# Patient Record
Sex: FEMALE | Race: WHITE | NOT HISPANIC OR LATINO | Employment: UNEMPLOYED | URBAN - METROPOLITAN AREA
[De-identification: names, ages, dates, MRNs, and addresses within clinical notes are randomized per-mention and may not be internally consistent; named-entity substitution may affect disease eponyms.]

---

## 2018-06-07 LAB
HBV SURFACE AG SERPL QL IA: NORMAL
HIV 1+2 AB+HIV1 P24 AG SERPL QL IA: NONREACTIVE
RUBELLA ABY IGG: NORMAL

## 2018-06-14 ENCOUNTER — HOSPITAL ENCOUNTER (OUTPATIENT)
Dept: ULTRASOUND IMAGING | Facility: CLINIC | Age: 28
Discharge: HOME | End: 2018-06-14
Attending: MIDWIFE | Admitting: MIDWIFE
Payer: MEDICAID

## 2018-06-14 DIAGNOSIS — Z3A.01 LESS THAN 8 WEEKS GESTATION OF PREGNANCY: ICD-10-CM

## 2018-06-14 PROCEDURE — T1013 SIGN LANG/ORAL INTERPRETER: HCPCS | Mod: U3

## 2018-06-14 PROCEDURE — 76817 TRANSVAGINAL US OBSTETRIC: CPT

## 2018-06-21 ENCOUNTER — HOSPITAL ENCOUNTER (EMERGENCY)
Facility: CLINIC | Age: 28
Discharge: HOME | End: 2018-06-21
Attending: EMERGENCY MEDICINE | Admitting: EMERGENCY MEDICINE
Payer: MEDICAID

## 2018-06-21 VITALS
RESPIRATION RATE: 20 BRPM | DIASTOLIC BLOOD PRESSURE: 65 MMHG | SYSTOLIC BLOOD PRESSURE: 108 MMHG | TEMPERATURE: 98.1 F | OXYGEN SATURATION: 100 % | WEIGHT: 86.5 LBS | HEART RATE: 107 BPM

## 2018-06-21 DIAGNOSIS — E87.6 HYPOKALEMIA: ICD-10-CM

## 2018-06-21 DIAGNOSIS — O21.0 HYPEREMESIS GRAVIDARUM: ICD-10-CM

## 2018-06-21 LAB
ALBUMIN SERPL-MCNC: 4.2 G/DL (ref 3.4–5)
ALBUMIN UR-MCNC: 30 MG/DL
ALP SERPL-CCNC: 59 U/L (ref 40–150)
ALT SERPL W P-5'-P-CCNC: 25 U/L (ref 0–50)
AMORPH CRY #/AREA URNS HPF: ABNORMAL /HPF
ANION GAP SERPL CALCULATED.3IONS-SCNC: 14 MMOL/L (ref 3–14)
APPEARANCE UR: ABNORMAL
AST SERPL W P-5'-P-CCNC: 25 U/L (ref 0–45)
BASOPHILS # BLD AUTO: 0 10E9/L (ref 0–0.2)
BASOPHILS NFR BLD AUTO: 0.1 %
BILIRUB SERPL-MCNC: 0.6 MG/DL (ref 0.2–1.3)
BILIRUB UR QL STRIP: NEGATIVE
BUN SERPL-MCNC: 9 MG/DL (ref 7–30)
CALCIUM SERPL-MCNC: 9.4 MG/DL (ref 8.5–10.1)
CHLORIDE SERPL-SCNC: 103 MMOL/L (ref 94–109)
CO2 SERPL-SCNC: 23 MMOL/L (ref 20–32)
COLOR UR AUTO: YELLOW
CREAT SERPL-MCNC: 0.52 MG/DL (ref 0.52–1.04)
DIFFERENTIAL METHOD BLD: NORMAL
EOSINOPHIL # BLD AUTO: 0 10E9/L (ref 0–0.7)
EOSINOPHIL NFR BLD AUTO: 0.2 %
ERYTHROCYTE [DISTWIDTH] IN BLOOD BY AUTOMATED COUNT: 12.1 % (ref 10–15)
GFR SERPL CREATININE-BSD FRML MDRD: >90 ML/MIN/1.7M2
GLUCOSE SERPL-MCNC: 81 MG/DL (ref 70–99)
GLUCOSE UR STRIP-MCNC: NEGATIVE MG/DL
HCT VFR BLD AUTO: 40.5 % (ref 35–47)
HGB BLD-MCNC: 14.4 G/DL (ref 11.7–15.7)
HGB UR QL STRIP: NEGATIVE
IMM GRANULOCYTES # BLD: 0 10E9/L (ref 0–0.4)
IMM GRANULOCYTES NFR BLD: 0.2 %
KETONES UR STRIP-MCNC: >150 MG/DL
LACTATE BLD-SCNC: 1.3 MMOL/L (ref 0.7–2)
LEUKOCYTE ESTERASE UR QL STRIP: NEGATIVE
LYMPHOCYTES # BLD AUTO: 2 10E9/L (ref 0.8–5.3)
LYMPHOCYTES NFR BLD AUTO: 23.2 %
MCH RBC QN AUTO: 31 PG (ref 26.5–33)
MCHC RBC AUTO-ENTMCNC: 35.6 G/DL (ref 31.5–36.5)
MCV RBC AUTO: 87 FL (ref 78–100)
MONOCYTES # BLD AUTO: 0.6 10E9/L (ref 0–1.3)
MONOCYTES NFR BLD AUTO: 6.7 %
MUCOUS THREADS #/AREA URNS LPF: PRESENT /LPF
NEUTROPHILS # BLD AUTO: 6.1 10E9/L (ref 1.6–8.3)
NEUTROPHILS NFR BLD AUTO: 69.6 %
NITRATE UR QL: NEGATIVE
NRBC # BLD AUTO: 0 10*3/UL
NRBC BLD AUTO-RTO: 0 /100
PH UR STRIP: 8 PH (ref 5–7)
PLATELET # BLD AUTO: 214 10E9/L (ref 150–450)
POTASSIUM SERPL-SCNC: 3.1 MMOL/L (ref 3.4–5.3)
PROT SERPL-MCNC: 8.8 G/DL (ref 6.8–8.8)
RBC # BLD AUTO: 4.64 10E12/L (ref 3.8–5.2)
RBC #/AREA URNS AUTO: 2 /HPF (ref 0–2)
SODIUM SERPL-SCNC: 140 MMOL/L (ref 133–144)
SOURCE: ABNORMAL
SP GR UR STRIP: 1.03 (ref 1–1.03)
SQUAMOUS #/AREA URNS AUTO: 3 /HPF (ref 0–1)
UROBILINOGEN UR STRIP-MCNC: 2 MG/DL (ref 0–2)
WBC # BLD AUTO: 8.8 10E9/L (ref 4–11)
WBC #/AREA URNS AUTO: 1 /HPF (ref 0–5)

## 2018-06-21 PROCEDURE — 83605 ASSAY OF LACTIC ACID: CPT | Performed by: EMERGENCY MEDICINE

## 2018-06-21 PROCEDURE — 99284 EMERGENCY DEPT VISIT MOD MDM: CPT | Mod: Z6 | Performed by: EMERGENCY MEDICINE

## 2018-06-21 PROCEDURE — 81001 URINALYSIS AUTO W/SCOPE: CPT | Performed by: EMERGENCY MEDICINE

## 2018-06-21 PROCEDURE — 96374 THER/PROPH/DIAG INJ IV PUSH: CPT | Performed by: EMERGENCY MEDICINE

## 2018-06-21 PROCEDURE — 96361 HYDRATE IV INFUSION ADD-ON: CPT | Performed by: EMERGENCY MEDICINE

## 2018-06-21 PROCEDURE — 80053 COMPREHEN METABOLIC PANEL: CPT | Performed by: EMERGENCY MEDICINE

## 2018-06-21 PROCEDURE — 85025 COMPLETE CBC W/AUTO DIFF WBC: CPT | Performed by: EMERGENCY MEDICINE

## 2018-06-21 PROCEDURE — 25000132 ZZH RX MED GY IP 250 OP 250 PS 637: Performed by: EMERGENCY MEDICINE

## 2018-06-21 PROCEDURE — 99284 EMERGENCY DEPT VISIT MOD MDM: CPT | Mod: 25 | Performed by: EMERGENCY MEDICINE

## 2018-06-21 PROCEDURE — 25000128 H RX IP 250 OP 636: Performed by: EMERGENCY MEDICINE

## 2018-06-21 PROCEDURE — 96375 TX/PRO/DX INJ NEW DRUG ADDON: CPT | Performed by: EMERGENCY MEDICINE

## 2018-06-21 RX ORDER — ONDANSETRON 2 MG/ML
4 INJECTION INTRAMUSCULAR; INTRAVENOUS ONCE
Status: COMPLETED | OUTPATIENT
Start: 2018-06-21 | End: 2018-06-21

## 2018-06-21 RX ORDER — POTASSIUM CHLORIDE 1.5 G/1.58G
40 POWDER, FOR SOLUTION ORAL ONCE
Status: COMPLETED | OUTPATIENT
Start: 2018-06-21 | End: 2018-06-21

## 2018-06-21 RX ORDER — ONDANSETRON 4 MG/1
4 TABLET, ORALLY DISINTEGRATING ORAL EVERY 8 HOURS PRN
Qty: 10 TABLET | Refills: 0 | Status: SHIPPED | OUTPATIENT
Start: 2018-06-21 | End: 2018-06-24

## 2018-06-21 RX ADMIN — SODIUM CHLORIDE 1000 ML: 9 INJECTION, SOLUTION INTRAVENOUS at 13:39

## 2018-06-21 RX ADMIN — POTASSIUM CHLORIDE 40 MEQ: 1.5 POWDER, FOR SOLUTION ORAL at 13:38

## 2018-06-21 RX ADMIN — SODIUM CHLORIDE 1000 ML: 9 INJECTION, SOLUTION INTRAVENOUS at 12:32

## 2018-06-21 RX ADMIN — PROCHLORPERAZINE EDISYLATE 5 MG: 5 INJECTION INTRAMUSCULAR; INTRAVENOUS at 12:33

## 2018-06-21 RX ADMIN — ONDANSETRON 4 MG: 2 INJECTION INTRAMUSCULAR; INTRAVENOUS at 12:33

## 2018-06-21 ASSESSMENT — ENCOUNTER SYMPTOMS
NECK STIFFNESS: 0
VOMITING: 1
ARTHRALGIAS: 0
DIFFICULTY URINATING: 0
EYE REDNESS: 0
ABDOMINAL PAIN: 0
NAUSEA: 1
SHORTNESS OF BREATH: 0
LIGHT-HEADEDNESS: 1
WEAKNESS: 1
FEVER: 0
HEADACHES: 0
CONFUSION: 0
COLOR CHANGE: 0

## 2018-06-21 NOTE — ED PROVIDER NOTES
History     Chief Complaint   Patient presents with     Hyperemesis     7 weeks pregant, unable to keep food or liquids down,  reports some blood in emesis     A  was used ( via Tame per ER iPad).     Marylin Mejía is a 27 year old female who is  at approximately 7 weeks' pregnancy who presents for evaluation of hyperemesis gravidarum.  The patient reports that since the onset of her pregnancy she has had ongoing, persistent, intense nausea as well as vomiting.  She estimates that she vomits approximate 50 times per day, vomiting essentially anything she attempts to take orally.  She does report a weight loss of 4-5 kg in the past week or so, and in the last couple of days she has begun to feel rather lightheaded and generally weak.  She also describes a pain in the lower aspect of both of her calves which is new.  The patient states that she has been following with a provider at [facility] and states that she was tried on an antiemetic at one point.  She states that this was initially helpful but subsequently stopped becoming efficacious.  She states that she last took this approximately 1 week ago.  She does not recall the name of that antinausea medication.  The patient states that she had follow up scheduled for , but due to her ongoing symptoms she contact her provider this morning who apparently advised she come to the ER for evaluation, with plan to attempt to move her schedule appointment sooner.  The patient denies any vaginal bleeding.  She states that urination also has been essentially normal albeit somewhat decreased in volume.  She reports no abdominal pain.  No other concerns at this time      No current facility-administered medications for this encounter.      Current Outpatient Prescriptions   Medication     ondansetron (ZOFRAN ODT) 4 MG ODT tab     Prenatal Vit-Fe Fumarate-FA (PRENATAL VITAMIN PO)     History reviewed. No pertinent past  medical history.    History reviewed. No pertinent surgical history.    No family history on file.    Social History   Substance Use Topics     Smoking status: Never Smoker     Smokeless tobacco: Never Used     Alcohol use No     No Known Allergies    I have reviewed the Medications, Allergies, Past Medical and Surgical History, and Social History in the Epic system.    Review of Systems   Constitutional: Negative for fever.   HENT: Negative for congestion.    Eyes: Negative for redness.   Respiratory: Negative for shortness of breath.    Cardiovascular: Negative for chest pain.   Gastrointestinal: Positive for nausea and vomiting. Negative for abdominal pain.   Genitourinary: Negative for difficulty urinating.   Musculoskeletal: Negative for arthralgias and neck stiffness.        Pain in both calves   Skin: Negative for color change.   Neurological: Positive for weakness (Generalized) and light-headedness. Negative for headaches.   Psychiatric/Behavioral: Negative for confusion.   All other systems reviewed and are negative.      Physical Exam   BP: 103/68  Pulse: 85  Heart Rate: 92  Temp: 98.1  F (36.7  C)  Resp: 16  Weight: 39.2 kg (86 lb 8 oz)  SpO2: 100 %      Physical Exam   Constitutional: No distress.   HENT:   Head: Atraumatic.   Mouth/Throat: Oropharynx is clear and moist. No oropharyngeal exudate.   Eyes: Pupils are equal, round, and reactive to light. No scleral icterus.   Cardiovascular: Normal heart sounds and intact distal pulses.    Pulmonary/Chest: Breath sounds normal. No respiratory distress.   Abdominal: Soft. Bowel sounds are normal. There is no tenderness.   Musculoskeletal: She exhibits no edema or tenderness.   Skin: Skin is warm. No rash ( Acneiform rash over face) noted. She is not diaphoretic.       ED Course     ED Course     Procedures        Results for orders placed or performed during the hospital encounter of 06/21/18 (from the past 24 hour(s))   CBC with platelets differential    Result Value Ref Range    WBC 8.8 4.0 - 11.0 10e9/L    RBC Count 4.64 3.8 - 5.2 10e12/L    Hemoglobin 14.4 11.7 - 15.7 g/dL    Hematocrit 40.5 35.0 - 47.0 %    MCV 87 78 - 100 fl    MCH 31.0 26.5 - 33.0 pg    MCHC 35.6 31.5 - 36.5 g/dL    RDW 12.1 10.0 - 15.0 %    Platelet Count 214 150 - 450 10e9/L    Diff Method Automated Method     % Neutrophils 69.6 %    % Lymphocytes 23.2 %    % Monocytes 6.7 %    % Eosinophils 0.2 %    % Basophils 0.1 %    % Immature Granulocytes 0.2 %    Nucleated RBCs 0 0 /100    Absolute Neutrophil 6.1 1.6 - 8.3 10e9/L    Absolute Lymphocytes 2.0 0.8 - 5.3 10e9/L    Absolute Monocytes 0.6 0.0 - 1.3 10e9/L    Absolute Eosinophils 0.0 0.0 - 0.7 10e9/L    Absolute Basophils 0.0 0.0 - 0.2 10e9/L    Abs Immature Granulocytes 0.0 0 - 0.4 10e9/L    Absolute Nucleated RBC 0.0    Comprehensive metabolic panel   Result Value Ref Range    Sodium 140 133 - 144 mmol/L    Potassium 3.1 (L) 3.4 - 5.3 mmol/L    Chloride 103 94 - 109 mmol/L    Carbon Dioxide 23 20 - 32 mmol/L    Anion Gap 14 3 - 14 mmol/L    Glucose 81 70 - 99 mg/dL    Urea Nitrogen 9 7 - 30 mg/dL    Creatinine 0.52 0.52 - 1.04 mg/dL    GFR Estimate >90 >60 mL/min/1.7m2    GFR Estimate If Black >90 >60 mL/min/1.7m2    Calcium 9.4 8.5 - 10.1 mg/dL    Bilirubin Total 0.6 0.2 - 1.3 mg/dL    Albumin 4.2 3.4 - 5.0 g/dL    Protein Total 8.8 6.8 - 8.8 g/dL    Alkaline Phosphatase 59 40 - 150 U/L    ALT 25 0 - 50 U/L    AST 25 0 - 45 U/L   Lactic acid   Result Value Ref Range    Lactic Acid 1.3 0.7 - 2.0 mmol/L   UA with Microscopic reflex to Culture   Result Value Ref Range    Color Urine Yellow     Appearance Urine Slightly Cloudy     Glucose Urine Negative NEG^Negative mg/dL    Bilirubin Urine Negative NEG^Negative    Ketones Urine >150 (A) NEG^Negative mg/dL    Specific Gravity Urine 1.029 1.003 - 1.035    Blood Urine Negative NEG^Negative    pH Urine 8.0 (H) 5.0 - 7.0 pH    Protein Albumin Urine 30 (A) NEG^Negative mg/dL    Urobilinogen  mg/dL 2.0 0.0 - 2.0 mg/dL    Nitrite Urine Negative NEG^Negative    Leukocyte Esterase Urine Negative NEG^Negative    Source Midstream Urine     WBC Urine 1 0 - 5 /HPF    RBC Urine 2 0 - 2 /HPF    Squamous Epithelial /HPF Urine 3 (H) 0 - 1 /HPF    Mucous Urine Present (A) NEG^Negative /LPF    Amorphous Crystals Few (A) NEG^Negative /HPF            Labs Ordered and Resulted from Time of ED Arrival Up to the Time of Departure from the ED   COMPREHENSIVE METABOLIC PANEL - Abnormal; Notable for the following:        Result Value    Potassium 3.1 (*)     All other components within normal limits   ROUTINE UA WITH MICROSCOPIC REFLEX TO CULTURE - Abnormal; Notable for the following:     Ketones Urine >150 (*)     pH Urine 8.0 (*)     Protein Albumin Urine 30 (*)     Squamous Epithelial /HPF Urine 3 (*)     Mucous Urine Present (*)     Amorphous Crystals Few (*)     All other components within normal limits   CBC WITH PLATELETS DIFFERENTIAL   LACTIC ACID WHOLE BLOOD   ORTHOSTATIC BLOOD PRESSURE AND PULSE            Assessments & Plan (with Medical Decision Making)   27-year-old female who is 7 weeks pregnant presents with 2 week history of recurrent vomiting.  Differential includes gastroenteritis, hyperemesis gravidarum, obstruction, other.  Initial exam revealed normal vital signs with a benign abdomen.  Laboratories were obtained including CBC, comprehensive metabolic panel and lactic acid all of which were normal with exception of slightly low potassium.  Urinalysis revealed urinary ketones.  Patient was given Zofran, Compazine, potassium and IV fluids in the emergency room and felt significantly improved.  She was able to tolerate fluids.  Signs and symptoms most consistent with hyperemesis gravidarum.  I recommended expedient follow-up with her primary care provider.  She will be discharged with prescription for Zofran.    I have reviewed the nursing notes.    I have reviewed the findings, diagnosis, plan and need for  follow up with the patient.    New Prescriptions    ONDANSETRON (ZOFRAN ODT) 4 MG ODT TAB    Take 1 tablet (4 mg) by mouth every 8 hours as needed for nausea       Final diagnoses:   Hyperemesis gravidarum   Hypokalemia     Robinson BARR, am serving as a trained medical scribe to document services personally performed by Yung Haile MD, based on the provider's statements to me.      Yung BARR MD, was physically present and have reviewed and verified the accuracy of this note documented by Robinson Ramírez.    6/21/2018   [facility], EMERGENCY DEPARTMENT     Yung Haile MD  06/21/18 9020

## 2018-06-21 NOTE — ED AVS SNAPSHOT
[facility], [place], Emergency Department    [address]    [facility] [address]    Phone:  [phone]    Fax:  [phone]                                       Marylin Mejía   MRN: [id number]    Department:  [facility], Emergency Department   Date of Visit:  6/21/2018           After Visit Summary Signature Page     I have received my discharge instructions, and my questions have been answered. I have discussed any challenges I see with this plan with the nurse or doctor.    ..........................................................................................................................................  Patient/Patient Representative Signature      ..........................................................................................................................................  Patient Representative Print Name and Relationship to Patient    ..................................................               ................................................  Date                                            Time    ..........................................................................................................................................  Reviewed by Signature/Title    ...................................................              ..............................................  Date                                                            Time

## 2018-06-21 NOTE — ED AVS SNAPSHOT
[facility], Emergency Department    [address]    [place] [address]    Phone:  [phone]    Fax:  [phone]                                       Marylin Mejía   MRN: [id number]    Department:  [facility], Emergency Department   Date of Visit:  6/21/2018           Patient Information     Date Of Birth          1990        Your diagnoses for this visit were:     Hyperemesis gravidarum     Hypokalemia        You were seen by Yung Haile MD.      Follow-up Information     Call [facility], Clinic.    Specialty:  Clinic    Contact information:    [address] [facility] [address]  [phone]          Discharge Instructions           Hipopotasiemia  Hipopotasiemia significa que tiene un bajo nivel de potasio en la galdino. [place] ocurre con mayor frecuencia en los pacientes que dorothy diuréticos (pastillas para orinar más). También puede presentarse debido a episodios maribell de vómitos o diarrea. O puede aparecer después de eugenia laxantes por mucho tiempo. A veces se presenta si la persona tiene un nivel bajo de magnesio (hipomagnesiemia). Si sergio es araujo seymour, araujo proveedor de atención médica evdin tratará el nivel bajo de magnesio.  Un seymour leve no suele causar síntomas. Sólo se lo detecta mediante un análisis de galdino. Las pérdidas más graves de potasio provocan carina debilidad generalizada, calambres en los músculos o el abdomen, palpitaciones (el corazón late en forma rápida o irregular), presión arterial baja, debilidad en músculos específicos y en algunas personas que están paralizadas.  Cuidados en araujo casa    [place] los suplementos de potasio que le hayan recetado.    Coma alimentos ricos en potasio. El contenido más alto de potasio se encuentra en el aguacate, las hermelinda asadas, la espinaca, el melón cantalupo, el bacalao, el hipogloso (halibut), el salmón y los ostiones (vieiras). Los frijoles zuniga, los frijoles blancos y los frijoles rojos son también buenas johnson de  potasio. Carina cantidad kassandra se puede encontrar en el jugo de naranja, los plátanos (bananas), las zanahorias y el jugo de tomate.    Si usted blair ciertos tipos de diuréticos, tendrá que eugenia también suplementos de potasio todo el tiempo que esté tomando las pastillas diuréticas. Si está tomando un diurético, hable con araujo médico sobre la necesidad de eugenia suplementos de potasio.  Visitas de control  Programe carina visita de control con araujo proveedor de atención médica para repetir el análisis de galdino dentro de la próxima semana, o según le recomiende nuestro personal.  Cuándo debe buscar atención médica  Llame a araujo proveedor de atención médica si ocurre algo de lo siguiente:    Aumento de la debilidad, fatiga o calambres musculares    Mareo  Llame al 911  Llame al 911 si se presenta cualquiera de estos síntomas:    Latidos cardíacos irregulares, latidos adicionales o frecuencia cardíaca muy rápida    Pérdida del conocimiento  Date Last Reviewed: 11/16/2017 2000-2017 [employer]. [address] [place] [address]. Todos los derechos reservados. Esta información no pretende sustituir la atención médica profesional. Sólo araujo médico puede diagnosticar y tratar un problema de yulissa.          Instrucciones de gilda para la hipokalemia  A usted le cardona diagnosticado hipokalemia (bajo nivel de potasio en la galdino). El potasio ayuda en el funcionamiento de las células nerviosas y musculares, incluyendo las células del corazón. Un bajo nivel de potasio en la galdino puede causar ritmos cardíacos anormales e incluso ataques cardíacos.  Cambios en la dieta    Aumente araujo consumo de los siguientes alimentos ricos en potasio:  ? Bananas  ? Naranjas y jugo de naranja  ? Tomates, salsa de tomate y jugo de tomate  ? Verduras de hojas verdes, jackelyn espinacas, ensaladas verdes, berzas y acelgas  ? Melones (de cualquier tipo)  ? Chícharos (arvejas)  ? Frijoles  ? Gisselle  ? Batatas  ? Aguacates y guacamole  ? Jugos  "de verduras, jackelyn V8  ? Jugos de frutas  ? Todo tipo de nueces y semillas  Otros cuidados en la casa    [place] un suplemento de potasio según le indique el médico.    Después de un ejercicio fatigante o cualquier actividad que le vikki sudar mucho, bárbara Gatorade u otras bebidas deportivas que contengan potasio.    Asegúrese de comer alimentos y eugenia líquidos que contengan potasio si usted tiene diarrea o vómito.    Vikki que le revisen araujo nivel de potasio regularmente.    [place] los medicamentos exactamente según las indicaciones.    Informe al médico sobre todos los medicamentos con o sin receta que esté tomando. [place] incluye las preparaciones herbales.    Evite alimentos con alto contenido de sal. Evite alimentos enlatados y preparados que tengan mucha sal.  Visitas de control    Programe carina visita de control según le indique el personal médico.    Asista a todas las visitas de control. El médico deberá vigilar de cerca araujo trastorno.  Cuándo debe llamar al médico  Llame al médico inmediatamente si presenta cualquiera de los siguientes síntomas:    Vómito    Fatiga    Diarrea    Ritmo cardíaco demasiado rápido o irregular    Falta de aliento (dificultad para respirar)    Dolor en el pecho    Calambres, espasmos o contracciones musculares    Debilidad    Parálisis      Date Last Reviewed: 6/20/2015 2000-2017 [employer]. [address] [place] [address], [place] [address]. Todos los derechos reservados. Esta información no pretende sustituir la atención médica profesional. Sólo araujo médico puede diagnosticar y tratar un problema de yulissa.        Hiperemesis gravídica  La hiperemesis gravídica es carina forma grave de \"náusea matutina\" que puede afectar a algunas mujeres yamel el embarazo. Puede desarrollarse alrededor de la quinta semana y durar hasta la semana número dieciséis del embarazo. En algunas mujeres, puede durar más tiempo. Los síntomas incluyen vómito y náuseas graves. Eso puede llevar a problemas tales " jackelyn pérdida de peso y deshidratación.  No se sabe con claridad qué es lo que causa la hiperemesis gravídica. Puede deberse a los niveles de hormonas que suben al comienzo del embarazo. Si los síntomas son graves, puede convertirse en carina seria amenaza para la mamá y para el feto. Por lo tanto, siga atentamente las sugerencias que se describen abajo.  Si los síntomas no logran controlarse con las medidas que usted blair en araujo casa, quizás necesiten hospitalizarla. Puede que le den líquidos y medicamentos por vía intravenosa (IV).  Cuidados en araujo casa    Dieta  ? Lleve un registro de los alimentos que come y de cómo estos afectan de síntomas. Evite los alimentos que le provocan los síntomas.  ? Coma varias comidas más pequeñas a lo camilo del día en lugar de adam comidas grandes. [place] ayuda a evitar que el estómago quede vacío, lo que puede empeorar las náuseas.  ? Elija comidas altas en carbohidratos. También puede ayudarle comer alimentos altos en proteínas. Evite los alimentos grasosos o muy condimentados.  ? Antes de levantarse de la cama por las mañanas, intente comer unas galletas o pan isaías seco. Eso puede ayudarle a asentar el estómago.  ? [place] líquidos transparentes, fríos. [place] pequeñas cantidades de líquidos con electrolitos, jackelyn las bebidas deportivas. Eso también puede ayudarle.    Medicamentos  ? De ser necesario, araujo proveedor de atención médica puede indicarle ciertos medicamentos para ayudar a aliviar las náuseas y el vómito. También puede que le aconsejen consumir vitamina B6 y jengibre. No pruebe ningún medicamento de venta jasvir ni remedio casero sin hablar edvin con araujo proveedor.  Visita de control  Programe carina visita de control con araujo proveedor de atención médica o según le hayan indicado.  Cuándo debe buscar atención médica  Llame a araujo proveedor de atención médica de inmediato si sucede cualquiera de las siguientes cosas:    Signos de deshidratación (boca seca, sed extrema, orina oscura o  poca orina, mareo, debilidad o desmayo)    Vómito que no se detiene    Incapacidad de mantener los líquidos en el estómago    Diarrea frecuente    Pérdida de peso o no aumentar de peso en un período de dos semanas    Dolor intenso y samina en la parte inferior derecha del abdomen    Fiebre de 100.4  F (38  C) o más, o según le haya indicado araujo proveedor  Date Last Reviewed: 8/20/2015 2000-2017 [employer]. [address] [place] [address]. Todos los derechos reservados. Esta información no pretende sustituir la atención médica profesional. Sólo araujo médico puede diagnosticar y tratar un problema de yulissa.          Hiperemesis gravídica (náuseas matutinas maribell)    Las náuseas y el vómito son comunes yamel el embarazo y generalmente se los conoce jackelyn  náuseas matutinas , yamileth pueden presentarse en cualquier momento del día. Sin embargo, las náuseas y el vómito maribell que no se alivian no son normales y pueden llevar a carina deshidratación y pérdida de peso. [place] puede ser peligroso tanto para la madre jackelyn para el bebé. La hiperemesis gravídica (hiperemesis gravidarum o HEG) es un término médico que designa al vómito y las náuseas intensos del embarazo. Si usted tiene hiperemesis gravídica, araujo proveedor de atención médica puede eugenia medidas para mantenerlos a usted y a araujo bebé seguros. Además, también puede ayudar a aliviar lo síntomas.   Síntomas de la hiperemesis gravídica  Llame a araujo proveedor de atención médica de inmediato si sospecha que tiene hiperemesis gravídica: Los síntomas incluyen:    Incapacidad de mantener los líquidos en el estómago    Náuseas intensas y que fernandez más allá de los primeros meses    Incapacidad de vaciar la vejiga     La orina es oscura y concentrada     Desmayos   Qué causa la hiperemesis gravídica?  Se armida que las náuseas y los vómitos del embarazo se deben a un aumento de ciertos niveles de hormonas. No se conoce a ciencia cierta qué la causa, yamileth es  más probable que se presente en mujeres que están gestando dos o más bebés. Araujo proveedor de atención médica le solicitará algunas pruebas para descartar ciertas afecciones que pueden provocar náuseas y vómitos intensos.  Cómo aliviar las náuseas matutinas  Para ayudar a combatir las náuseas, vikki comidas más pequeñas con frecuencia. [place] ayuda a evitar que el estómago quede vacío, lo que puede empeorar las náuseas. Elija alimentos secos, por ejemplo, galletas. Pruebe tomando líquidos fríos y brandon de a sorbos. Y consulte a araujo proveedor de atención médica sobre la posibilidad de eugenia vitamina B6 o jengibre para ayudar a aliviar las náuseas. Araujo proveedor podría recomendarle que pruebe tomando vitamina B6 y un medicamento llamado doxilamina para aliviar las náuseas. En algunos casos, los tratamientos alternativos jackelyn la acupuntura son efectivos para ayudar a manejar las náuseas del embarazo.  El tratamiento de la hiperemesis gravídica  Apunta a aliviar los síntomas y a prevenir la pérdida de peso y la deshidratación. Si usted está deshidratada o está bajando de peso, es necesario eugenia medidas para protegerla a usted y proteger a araujo bebé. Lo más probable es que la ingresen en un hospital por lo menos por un período breve. Es posible que allí le administren líquidos intravenosos (IV) para rehidratarla. También es posible que le receten medicamentos para aliviar las náuseas. En los casos muy graves, puede ser necesaria carina hospitalización más larga. En parker syemour se podría usar nutrición IV o alimentación por sonda. Si esto es necesario, araujo proveedor de atención médica le dará más información.  Recuperación y seguimiento  La hiperemesis gravídica se puede manejar con tratamiento. [place]ki los controles con araujo proveedor de atención médica para asegurarse de no devolver los líquidos y de subir de peso en un avelino saludable.  Cuándo llamar a araujo proveedor de atención médica  Llame a araujo proveedor de atención médica de inmediato  si tiene alguno de los siguientes síntomas:    Señales de deshidratación, que incluyen sed extrema, dolor de laurie, poca cantidad de orina, orina muy oscura o boca seca y pegajosa.    Pérdida de peso    Mareos o desmayos    Latidos maribell o acelerados    Sam en araujo vómito   Date Last Reviewed: 5/1/2016 2000-2017 [employer]. [address] [place] [address], [place] [address]. Todos los derechos reservados. Esta información no pretende sustituir la atención médica profesional. Sólo araujo médico puede diagnosticar y tratar un problema de yulissa.          24 Hour Appointment Hotline       To make an appointment at any [facility] clinic, call [phone] ([phone]). If you don't have a family doctor or clinic, we will help you find one. [facility] clinics are conveniently located to serve the needs of you and your family.             Review of your medicines      START taking        Dose / Directions Last dose taken    ondansetron 4 MG ODT tab   Commonly known as:  ZOFRAN ODT   Dose:  4 mg   Quantity:  10 tablet        Take 1 tablet (4 mg) by mouth every 8 hours as needed for nausea   Refills:  0          Our records show that you are taking the medicines listed below. If these are incorrect, please call your family doctor or clinic.        Dose / Directions Last dose taken    PRENATAL VITAMIN PO   Dose:  1 tablet        Take 1 tablet by mouth daily   Refills:  0        UNKNOWN TO PATIENT        Medication for nausea   Refills:  0                Prescriptions were sent or printed at these locations (1 Prescription)                   Other Prescriptions                Printed at Department/Unit printer (1 of 1)         ondansetron (ZOFRAN ODT) 4 MG ODT tab                Procedures and tests performed during your visit     CBC with platelets differential    Comprehensive metabolic panel    Lactic acid    Medication History IP Pharmacy Consult    Orthostatic blood pressure and pulse    UA with Microscopic  "reflex to Culture      Orders Needing Specimen Collection     None      Pending Results     No orders found from 2018 to 2018.            Pending Culture Results     No orders found from 2018 to 2018.            Pending Results Instructions     If you had any lab results that were not finalized at the time of your Discharge, you can call the ED Lab Result RN at [phone]. You will be contacted by this team for any positive Lab results or changes in treatment. The nurses are available 7 days a week from 10A to 6:30P.  You can leave a message 24 hours per day and they will return your call.        Thank you for choosing [facility]       Thank you for choosing [facility] for your care. Our goal is always to provide you with excellent care. Hearing back from our patients is one way we can continue to improve our services. Please take a few minutes to complete the written survey that you may receive in the mail after you visit with us. Thank you!        [employer] Information     [employer] lets you send messages to your doctor, view your test results, renew your prescriptions, schedule appointments and more. To sign up, go to [email or web address] . Click on \"Log in\" on the left side of the screen, which will take you to the Welcome page. Then click on \"Sign up Now\" on the right side of the page.     You will be asked to enter the access code listed below, as well as some personal information. Please follow the directions to create your username and password.     Your access code is: [email or web address]  Expires: 2018  3:58 PM     Your access code will  in 90 days. If you need help or a new code, please call your [facility] clinic or [phone].        Care EveryWhere ID     This is your Care EveryWhere ID. This could be used by other organizations to access your [facility] medical records  [id number]        Equal Access to Services     LUNA GARAY: yamila Teixeira, " michael douglas ah. So [facility] [phone].    ATENCIÓN: Si habla carlañol, tiene a araujo disposición servicios gratuitos de asistencia lingüística. Llame al [phone].    We comply with applicable federal civil rights laws and [place] laws. We do not discriminate on the basis of race, color, national origin, age, disability, sex, sexual orientation, or gender identity.            After Visit Summary       This is your record. Keep this with you and show to your community pharmacist(s) and doctor(s) at your next visit.

## 2018-06-21 NOTE — PHARMACY-ADMISSION MEDICATION HISTORY
Admission medication history interview status for the 6/21/2018 admission is complete. See Epic admission navigator for allergy information, pharmacy and prior to admission medications.     Medication history interview sources: Patient, [facility] pharmacy ([place]; [phone])    Changes made to PTA medication list (reason)  Added: unknown medication for nausea  Deleted: none  Changed: nond    Additional medication history information (including reliability of information, actions taken by pharmacist): This writer spoke with the patient via the iPad Kinyarwanda interpretor. The patient was a moderately reliable historian. She did not know the name of her medication for nausea. The patient reported she took it and experience good relief from nausea in the beginning but it stopped working so she has not taken it for about a week. This writer spoke with the outpatient [facility] pharmacist for her prescription filling history. Unfortunately, the [facility] computer system is down nation wide and the staff is not able to access patient's medication list this afternoon.      Prior to Admission medications    Medication Sig Last Dose Taking? Auth Provider   Prenatal Vit-Fe Fumarate-FA (PRENATAL VITAMIN PO) Take 1 tablet by mouth daily 2 days ago at Unknown time Yes Reported, Patient   UNKNOWN TO PATIENT Medication for nausea about a week ago Yes Unknown, Entered By History       Medication history completed by:  Corie Arzate, PharmD, BCPP  Behavioral ER Pharmacist  [phone]

## 2018-06-21 NOTE — DISCHARGE INSTRUCTIONS
Hipopotasiemia  Hipopotasiemia significa que tiene un bajo nivel de potasio en la galdino. Pierz ocurre con mayor frecuencia en los pacientes que dorothy diuréticos (pastillas para orinar más). También puede presentarse debido a episodios maribell de vómitos o diarrea. O puede aparecer después de eugenia laxantes por mucho tiempo. A veces se presenta si la persona tiene un nivel bajo de magnesio (hipomagnesiemia). Si sergio es araujo seymour, araujo proveedor de atención médica edvin tratará el nivel bajo de magnesio.  Un seymour leve no suele causar síntomas. Sólo se lo detecta mediante un análisis de galdino. Las pérdidas más graves de potasio provocan pratima debilidad generalizada, calambres en los músculos o el abdomen, palpitaciones (el corazón late en forma rápida o irregular), presión arterial baja, debilidad en músculos específicos y en algunas personas que están paralizadas.  Cuidados en araujo casa    [place] los suplementos de potasio que le hayan recetado.    Coma alimentos ricos en potasio. El contenido más alto de potasio se encuentra en el aguacate, las hermelinda asadas, la espinaca, el melón cantalupo, el bacalao, el hipogloso (halibut), el salmón y los ostiones (vieiras). Los frijoles zuniga, los frijoles blancos y los frijoles rojos son también buenas johnson de potasio. Pratima cantidad kassandra se puede encontrar en el jugo de naranja, los plátanos (bananas), las zanahorias y el jugo de tomate.    Si usted blair ciertos tipos de diuréticos, tendrá que eugenia también suplementos de potasio todo el tiempo que esté tomando las pastillas diuréticas. Si está tomando un diurético, hable con araujo médico sobre la necesidad de eugenia suplementos de potasio.  Visitas de control  Programe pratima visita de control con araujo proveedor de atención médica para repetir el análisis de galdino dentro de la próxima semana, o según le recomiende nuestro personal.  Cuándo debe buscar atención médica  Llame a araujo proveedor de atención médica si ocurre algo de lo  siguiente:    Aumento de la debilidad, fatiga o calambres musculares    Mareo  Llame al 911  Llame al 911 si se presenta cualquiera de estos síntomas:    Latidos cardíacos irregulares, latidos adicionales o frecuencia cardíaca muy rápida    Pérdida del conocimiento  Date Last Reviewed: 11/16/2017 2000-2017 [employer]. [address] [place] [address]. Todos los derechos reservados. Esta información no pretende sustituir la atención médica profesional. Sólo araujo médico puede diagnosticar y tratar un problema de yulissa.          Instrucciones de gilda para la hipokalemia  A usted le cardona diagnosticado hipokalemia (bajo nivel de potasio en la galdino). El potasio ayuda en el funcionamiento de las células nerviosas y musculares, incluyendo las células del corazón. Un bajo nivel de potasio en la galdino puede causar ritmos cardíacos anormales e incluso ataques cardíacos.  Cambios en la dieta    Aumente araujo consumo de los siguientes alimentos ricos en potasio:  ? Bananas  ? Naranjas y jugo de naranja  ? Tomates, salsa de tomate y jugo de tomate  ? Verduras de hojas verdes, jackelyn espinacas, ensaladas verdes, berzas y acelgas  ? Melones (de cualquier tipo)  ? Chícharos (arvejas)  ? Frijoles  ? Gisselle  ? Batatas  ? Aguacates y guacamole  ? Jugos de verduras, jackelyn V8  ? Jugos de frutas  ? Todo tipo de nueces y semillas  Otros cuidados en la casa    [place] un suplemento de potasio según le indique el médico.    Después de un ejercicio fatigante o cualquier actividad que le vikki sudar mucho, bárbara Gatorade u otras bebidas deportivas que contengan potasio.    Asegúrese de comer alimentos y eugenia líquidos que contengan potasio si usted tiene diarrea o vómito.    Vikki que le revisen araujo nivel de potasio regularmente.    [place] los medicamentos exactamente según las indicaciones.    Informe al médico sobre todos los medicamentos con o sin receta que esté tomando. [place] incluye las preparaciones herbales.    Evite  "alimentos con alto contenido de sal. Evite alimentos enlatados y preparados que tengan mucha sal.  Visitas de control    Programe carina visita de control según le indique el personal médico.    Asista a todas las visitas de control. El médico deberá vigilar de cerca araujo trastorno.  Cuándo debe llamar al médico  Llame al médico inmediatamente si presenta cualquiera de los siguientes síntomas:    Vómito    Fatiga    Diarrea    Ritmo cardíaco demasiado rápido o irregular    Falta de aliento (dificultad para respirar)    Dolor en el pecho    Calambres, espasmos o contracciones musculares    Debilidad    Parálisis      Date Last Reviewed: 6/20/2015 2000-2017 [employer]. [address] [place] [address]. Todos los derechos reservados. Esta información no pretende sustituir la atención médica profesional. Sólo araujo médico puede diagnosticar y tratar un problema de yulissa.        Hiperemesis gravídica  La hiperemesis gravídica es carina forma grave de \"náusea matutina\" que puede afectar a algunas mujeres yamel el embarazo. Puede desarrollarse alrededor de la quinta semana y durar hasta la semana número dieciséis del embarazo. En algunas mujeres, puede durar más tiempo. Los síntomas incluyen vómito y náuseas graves. Eso puede llevar a problemas tales jackelyn pérdida de peso y deshidratación.  No se sabe con claridad qué es lo que causa la hiperemesis gravídica. Puede deberse a los niveles de hormonas que suben al comienzo del embarazo. Si los síntomas son graves, puede convertirse en carina seria amenaza para la mamá y para el feto. Por lo tanto, siga atentamente las sugerencias que se describen abajo.  Si los síntomas no logran controlarse con las medidas que usted blair en araujo casa, quizás necesiten hospitalizarla. Puede que le den líquidos y medicamentos por vía intravenosa (IV).  Cuidados en araujo casa    Dieta  ? Lleve un registro de los alimentos que come y de cómo estos afectan de síntomas. Evite los " alimentos que le provocan los síntomas.  ? Coma varias comidas más pequeñas a lo camilo del día en lugar de adam comidas grandes. [place] ayuda a evitar que el estómago quede vacío, lo que puede empeorar las náuseas.  ? Elija comidas altas en carbohidratos. También puede ayudarle comer alimentos altos en proteínas. Evite los alimentos grasosos o muy condimentados.  ? Antes de levantarse de la cama por las mañanas, intente comer unas galletas o pan isaías seco. Eso puede ayudarle a asentar el estómago.  ? [place] líquidos transparentes, fríos. [place] pequeñas cantidades de líquidos con electrolitos, jackelyn las bebidas deportivas. Eso también puede ayudarle.    Medicamentos  ? De ser necesario, araujo proveedor de atención médica puede indicarle ciertos medicamentos para ayudar a aliviar las náuseas y el vómito. También puede que le aconsejen consumir vitamina B6 y jengibre. No pruebe ningún medicamento de venta jasvir ni remedio casero sin hablar edvin con araujo proveedor.  Visita de control  Programe carina visita de control con araujo proveedor de atención médica o según le hayan indicado.  Cuándo debe buscar atención médica  Llame a araujo proveedor de atención médica de inmediato si sucede cualquiera de las siguientes cosas:    Signos de deshidratación (boca seca, sed extrema, orina oscura o poca orina, mareo, debilidad o desmayo)    Vómito que no se detiene    Incapacidad de mantener los líquidos en el estómago    Diarrea frecuente    Pérdida de peso o no aumentar de peso en un período de dos semanas    Dolor intenso y samina en la parte inferior derecha del abdomen    Fiebre de 100.4  F (38  C) o más, o según le haya indicado araujo proveedor  Date Last Reviewed: 8/20/2015 2000-2017 [employer]. [address] [place] [address]. Todos los derechos reservados. Esta información no pretende sustituir la atención médica profesional. Sólo araujo médico puede diagnosticar y tratar un problema de yulissa.          Hiperemesis  gravídica (náuseas matutinas maribell)    Las náuseas y el vómito son comunes yamel el embarazo y generalmente se los conoce jackelyn  náuseas matutinas , yamileth pueden presentarse en cualquier momento del día. Sin embargo, las náuseas y el vómito maribell que no se alivian no son normales y pueden llevar a carina deshidratación y pérdida de peso. [place] puede ser peligroso tanto para la madre jackelyn para el bebé. La hiperemesis gravídica (hiperemesis gravidarum o HEG) es un término médico que designa al vómito y las náuseas intensos del embarazo. Si usted tiene hiperemesis gravídica, pool proveedor de atención médica puede eugenia medidas para mantenerlos a usted y a pool bebé seguros. Además, también puede ayudar a aliviar lo síntomas.   Síntomas de la hiperemesis gravídica  Llame a pool proveedor de atención médica de inmediato si sospecha que tiene hiperemesis gravídica: Los síntomas incluyen:    Incapacidad de mantener los líquidos en el estómago    Náuseas intensas y que fernandez más allá de los primeros meses    Incapacidad de vaciar la vejiga     La orina es oscura y concentrada     Desmayos   Qué causa la hiperemesis gravídica?  Se armida que las náuseas y los vómitos del embarazo se deben a un aumento de ciertos niveles de hormonas. No se conoce a ciencia cierta qué la causa, yamileth es más probable que se presente en mujeres que están gestando dos o más bebés. Pool proveedor de atención médica le solicitará algunas pruebas para descartar ciertas afecciones que pueden provocar náuseas y vómitos intensos.  Cómo aliviar las náuseas matutinas  Para ayudar a combatir las náuseas, shannon comidas más pequeñas con frecuencia. [place] ayuda a evitar que el estómago quede vacío, lo que puede empeorar las náuseas. Elija alimentos secos, por ejemplo, galletas. Pruebe tomando líquidos fríos y brandon de a sorbos. Y consulte a pool proveedor de atención médica sobre la posibilidad de eugenia vitamina B6 o jengibre para ayudar a aliviar las náuseas. Pool  proveedor podría recomendarle que pruebe tomando vitamina B6 y un medicamento llamado doxilamina para aliviar las náuseas. En algunos casos, los tratamientos alternativos jackelyn la acupuntura son efectivos para ayudar a manejar las náuseas del embarazo.  El tratamiento de la hiperemesis gravídica  Apunta a aliviar los síntomas y a prevenir la pérdida de peso y la deshidratación. Si usted está deshidratada o está bajando de peso, es necesario eugenia medidas para protegerla a usted y proteger a araujo bebé. Lo más probable es que la ingresen en un hospital por lo menos por un período breve. Es posible que allí le administren líquidos intravenosos (IV) para rehidratarla. También es posible que le receten medicamentos para aliviar las náuseas. En los casos muy graves, puede ser necesaria carina hospitalización más larga. En parker seymour se podría usar nutrición IV o alimentación por sonda. Si esto es necesario, araujo proveedor de atención médica le dará más información.  Recuperación y seguimiento  La hiperemesis gravídica se puede manejar con tratamiento. [place] los controles con araujo proveedor de atención médica para asegurarse de no devolver los líquidos y de subir de peso en un avelino saludable.  Cuándo llamar a araujo proveedor de atención médica  Llame a araujo proveedor de atención médica de inmediato si tiene alguno de los siguientes síntomas:    Señales de deshidratación, que incluyen sed extrema, dolor de laurie, poca cantidad de orina, orina muy oscura o boca seca y pegajosa.    Pérdida de peso    Mareos o desmayos    Latidos maribell o acelerados    Sam en araujo vómito   Date Last Reviewed: 5/1/2016 2000-2017 [employer]. [address] [place] [address], [place] [address]. Todos los derechos reservados. Esta información no pretende sustituir la atención médica profesional. Sólo araujo médico puede diagnosticar y tratar un problema de yulissa.

## 2018-07-05 DIAGNOSIS — Z36.89 ENCOUNTER FOR FETAL ANATOMIC SURVEY: ICD-10-CM

## 2018-07-05 DIAGNOSIS — O21.9 NAUSEA AND VOMITING IN PREGNANCY PRIOR TO 22 WEEKS GESTATION: Primary | ICD-10-CM

## 2018-07-05 DIAGNOSIS — Z36.82 ENCOUNTER FOR (NT) NUCHAL TRANSLUCENCY SCAN: Primary | ICD-10-CM

## 2018-07-05 RX ORDER — DEXTROSE, SODIUM CHLORIDE, SODIUM LACTATE, POTASSIUM CHLORIDE, AND CALCIUM CHLORIDE 5; .6; .31; .03; .02 G/100ML; G/100ML; G/100ML; G/100ML; G/100ML
1000 INJECTION, SOLUTION INTRAVENOUS ONCE
Status: CANCELLED | OUTPATIENT
Start: 2018-07-05 | End: 2018-07-05

## 2018-07-05 RX ORDER — ONDANSETRON 2 MG/ML
4 INJECTION INTRAMUSCULAR; INTRAVENOUS ONCE
Status: CANCELLED | OUTPATIENT
Start: 2018-07-05 | End: 2018-07-05

## 2018-07-13 ENCOUNTER — OFFICE VISIT (OUTPATIENT)
Dept: INTERPRETER SERVICES | Facility: CLINIC | Age: 28
End: 2018-07-13
Payer: MEDICAID

## 2018-07-13 ENCOUNTER — INFUSION THERAPY VISIT (OUTPATIENT)
Dept: INFUSION THERAPY | Facility: CLINIC | Age: 28
End: 2018-07-13
Attending: ADVANCED PRACTICE MIDWIFE
Payer: MEDICAID

## 2018-07-13 ENCOUNTER — TRANSFERRED RECORDS (OUTPATIENT)
Dept: HEALTH INFORMATION MANAGEMENT | Facility: CLINIC | Age: 28
End: 2018-07-13

## 2018-07-13 VITALS
TEMPERATURE: 98.8 F | RESPIRATION RATE: 18 BRPM | OXYGEN SATURATION: 98 % | HEART RATE: 118 BPM | DIASTOLIC BLOOD PRESSURE: 69 MMHG | SYSTOLIC BLOOD PRESSURE: 118 MMHG

## 2018-07-13 DIAGNOSIS — O21.9 NAUSEA AND VOMITING IN PREGNANCY PRIOR TO 22 WEEKS GESTATION: Primary | ICD-10-CM

## 2018-07-13 PROCEDURE — T1013 SIGN LANG/ORAL INTERPRETER: HCPCS | Mod: U3

## 2018-07-13 PROCEDURE — 96374 THER/PROPH/DIAG INJ IV PUSH: CPT

## 2018-07-13 PROCEDURE — 25000128 H RX IP 250 OP 636

## 2018-07-13 PROCEDURE — 25000128 H RX IP 250 OP 636: Performed by: ADVANCED PRACTICE MIDWIFE

## 2018-07-13 PROCEDURE — 96361 HYDRATE IV INFUSION ADD-ON: CPT

## 2018-07-13 RX ORDER — DEXTROSE, SODIUM CHLORIDE, SODIUM LACTATE, POTASSIUM CHLORIDE, AND CALCIUM CHLORIDE 5; .6; .31; .03; .02 G/100ML; G/100ML; G/100ML; G/100ML; G/100ML
1000 INJECTION, SOLUTION INTRAVENOUS ONCE
Status: COMPLETED | OUTPATIENT
Start: 2018-07-13 | End: 2018-07-13

## 2018-07-13 RX ORDER — ONDANSETRON 2 MG/ML
4 INJECTION INTRAMUSCULAR; INTRAVENOUS ONCE
Status: CANCELLED | OUTPATIENT
Start: 2018-07-13 | End: 2018-07-13

## 2018-07-13 RX ORDER — DEXTROSE, SODIUM CHLORIDE, SODIUM LACTATE, POTASSIUM CHLORIDE, AND CALCIUM CHLORIDE 5; .6; .31; .03; .02 G/100ML; G/100ML; G/100ML; G/100ML; G/100ML
1000 INJECTION, SOLUTION INTRAVENOUS ONCE
Status: CANCELLED | OUTPATIENT
Start: 2018-07-16 | End: 2018-07-16

## 2018-07-13 RX ORDER — ONDANSETRON 2 MG/ML
4 INJECTION INTRAMUSCULAR; INTRAVENOUS ONCE
Status: CANCELLED | OUTPATIENT
Start: 2018-07-16 | End: 2018-07-16

## 2018-07-13 RX ORDER — ONDANSETRON 2 MG/ML
4 INJECTION INTRAMUSCULAR; INTRAVENOUS ONCE
Status: COMPLETED | OUTPATIENT
Start: 2018-07-13 | End: 2018-07-13

## 2018-07-13 RX ORDER — DEXTROSE, SODIUM CHLORIDE, SODIUM LACTATE, POTASSIUM CHLORIDE, AND CALCIUM CHLORIDE 5; .6; .31; .03; .02 G/100ML; G/100ML; G/100ML; G/100ML; G/100ML
1000 INJECTION, SOLUTION INTRAVENOUS ONCE
Status: CANCELLED | OUTPATIENT
Start: 2018-07-13 | End: 2018-07-13

## 2018-07-13 RX ORDER — ONDANSETRON 2 MG/ML
INJECTION INTRAMUSCULAR; INTRAVENOUS
Status: COMPLETED
Start: 2018-07-13 | End: 2018-07-13

## 2018-07-13 RX ADMIN — ONDANSETRON 4 MG: 2 INJECTION INTRAMUSCULAR; INTRAVENOUS at 09:54

## 2018-07-13 RX ADMIN — SODIUM CHLORIDE, SODIUM LACTATE, POTASSIUM CHLORIDE, CALCIUM CHLORIDE AND DEXTROSE MONOHYDRATE 1000 ML: 5; 600; 310; 30; 20 INJECTION, SOLUTION INTRAVENOUS at 09:27

## 2018-07-13 NOTE — MR AVS SNAPSHOT
After Visit Summary   7/13/2018    Marylin Mejía    MRN: [id number]           Patient Information     Date Of Birth          1990        Visit Information        Provider Department      7/13/2018 9:30 AM Yloanda Polanco; [facility] CH 06  Services Department        Today's Diagnoses     Nausea and vomiting in pregnancy prior to 22 weeks gestation    -  1       Follow-ups after your visit        Your next 10 appointments already scheduled     Jul 16, 2018  1:30 PM CDT   Level 2 with UR CH 03   [facility], [place], Infusion Services ([facility])    [address]  [address]  [facility] [address]   [phone]            Jul 19, 2018  9:00 AM CDT   Level 2 with UR CH 01   [facility] [place], Infusion Services ([facility])    [address]  [address]  [facility] [address]   [phone]            Jul 20, 2018  8:00 AM CDT   Genetic Counseling with UR GEN COUNSELOR 1   [facility])    [address]  [facility] [address]   [phone]            Jul 20, 2018  8:45 AM CDT   MFM NUCHAL TRANSLUCENCY with [id number]   [facility])    [address]  [facility] [address]   [phone]            Jul 20, 2018  9:15 AM CDT   Radiology MD with ALEXX NOWAK MD   [facility])    [address]  [facility] [address]   [phone]           Please arrive at the time given for your first appointment. This visit is used internally to schedule the physician's time during your ultrasound.            Sep 07, 2018  8:45 AM CDT   MFM US COMP with [id number]   [facility] - [facility]  "[facility])    [address]  [facility] [address]   [phone]           Wear comfortable clothes and leave your valuables at home.            Sep 07, 2018  9:15 AM CDT   Radiology MD with UR ELIU BURGOS   [facility] [place] ([facility])    [address]  [facility] [address]   [phone]           Please arrive at the time given for your first appointment. This visit is used internally to schedule the physician's time during your ultrasound.              Who to contact     If you have questions or need follow up information about today's clinic visit or your schedule please contact [facility], [place], [facility] SERVICES directly at [phone].  Normal or non-critical lab and imaging results will be communicated to you by Bondsyhart, letter or phone within 4 business days after the clinic has received the results. If you do not hear from us within 7 days, please contact the clinic through Bondsyhart or phone. If you have a critical or abnormal lab result, we will notify you by phone as soon as possible.  Submit refill requests through enVerid or call your pharmacy and they will forward the refill request to us. Please allow 3 business days for your refill to be completed.          Additional Information About Your Visit        enVerid Information     enVerid lets you send messages to your doctor, view your test results, renew your prescriptions, schedule appointments and more. To sign up, go to [email or web address] . Click on \"Log in\" on the left side of the screen, which will take you to the Welcome page. Then click on \"Sign up Now\" on the right side of the page.     You will be asked to enter the access code listed below, as well as some personal information. Please follow the directions to create your username and password.     Your access code is: [email or web address]  Expires: 2018  3:58 PM     Your access code will  in 90 days. If you " need help or a new code, please call your [place] clinic or [phone].        Care EveryWhere ID     This is your Care EveryWhere ID. This could be used by other organizations to access your [place] medical records  [id number]        Your Vitals Were     Pulse Temperature Respirations Last Period Pulse Oximetry       118 98.8  F (37.1  C) (Oral) 18 04/22/2018 98%        Blood Pressure from Last 3 Encounters:   07/13/18 118/69   06/21/18 108/65    Weight from Last 3 Encounters:   06/21/18 39.2 kg (86 lb 8 oz)              Today, you had the following     No orders found for display       Primary Care Provider Office Phone # Fax #    Clinic [facility] [phone]       [address] [facility] [address]        Equal Access to Services     LUNA ROSAS : Hadii debbie feliz hadasho Soomaali, waaxda luqadaha, qaybta kaalmada adeegyada, michael lopez . So [facility] [phone].    ATENCIÓN: Si habla español, tiene a araujo disposición servicios gratuitos de asistencia lingüística. Luis al [phone].    We comply with applicable federal civil rights laws and [place] laws. We do not discriminate on the basis of race, color, national origin, age, disability, sex, sexual orientation, or gender identity.            Thank you!     Thank you for choosing [facility]  for your care. Our goal is always to provide you with excellent care. Hearing back from our patients is one way we can continue to improve our services. Please take a few minutes to complete the written survey that you may receive in the mail after your visit with us. Thank you!             Your Updated Medication List - Protect others around you: Learn how to safely use, store and throw away your medicines at www.disposemymeds.org.          This list is accurate as of 7/13/18 11:58 AM.  Always use your most recent med list.                   Brand Name Dispense Instructions for use Diagnosis     PRENATAL VITAMIN PO      Take 1 tablet by mouth daily        UNKNOWN TO PATIENT      Medication for nausea

## 2018-07-13 NOTE — PROGRESS NOTES
Pt here w/  and  for fluids.  States she hasn't kept anything down in a month.  1 L D5 LR infused over 1 hr via P IV.  Zofran IV push.  Tolerated without incident.  Says she feels a little better by end.  D/C to home w/ .  RTC Monday.

## 2018-07-16 ENCOUNTER — INFUSION THERAPY VISIT (OUTPATIENT)
Dept: INFUSION THERAPY | Facility: CLINIC | Age: 28
End: 2018-07-16
Attending: ADVANCED PRACTICE MIDWIFE
Payer: MEDICAID

## 2018-07-16 VITALS
DIASTOLIC BLOOD PRESSURE: 56 MMHG | SYSTOLIC BLOOD PRESSURE: 96 MMHG | HEART RATE: 72 BPM | RESPIRATION RATE: 16 BRPM | TEMPERATURE: 98.3 F | OXYGEN SATURATION: 100 %

## 2018-07-16 DIAGNOSIS — O21.9 NAUSEA AND VOMITING IN PREGNANCY PRIOR TO 22 WEEKS GESTATION: Primary | ICD-10-CM

## 2018-07-16 PROCEDURE — 25000128 H RX IP 250 OP 636: Performed by: ADVANCED PRACTICE MIDWIFE

## 2018-07-16 PROCEDURE — 96361 HYDRATE IV INFUSION ADD-ON: CPT

## 2018-07-16 PROCEDURE — 96374 THER/PROPH/DIAG INJ IV PUSH: CPT

## 2018-07-16 PROCEDURE — T1013 SIGN LANG/ORAL INTERPRETER: HCPCS | Mod: U3

## 2018-07-16 RX ORDER — ONDANSETRON 2 MG/ML
4 INJECTION INTRAMUSCULAR; INTRAVENOUS ONCE
Status: CANCELLED | OUTPATIENT
Start: 2018-07-16 | End: 2018-07-16

## 2018-07-16 RX ORDER — ONDANSETRON 2 MG/ML
INJECTION INTRAMUSCULAR; INTRAVENOUS
Status: DISCONTINUED
Start: 2018-07-16 | End: 2018-07-16 | Stop reason: HOSPADM

## 2018-07-16 RX ORDER — DEXTROSE, SODIUM CHLORIDE, SODIUM LACTATE, POTASSIUM CHLORIDE, AND CALCIUM CHLORIDE 5; .6; .31; .03; .02 G/100ML; G/100ML; G/100ML; G/100ML; G/100ML
1000 INJECTION, SOLUTION INTRAVENOUS ONCE
Status: CANCELLED | OUTPATIENT
Start: 2018-07-16 | End: 2018-07-16

## 2018-07-16 RX ORDER — DEXTROSE, SODIUM CHLORIDE, SODIUM LACTATE, POTASSIUM CHLORIDE, AND CALCIUM CHLORIDE 5; .6; .31; .03; .02 G/100ML; G/100ML; G/100ML; G/100ML; G/100ML
1000 INJECTION, SOLUTION INTRAVENOUS ONCE
Status: COMPLETED | OUTPATIENT
Start: 2018-07-16 | End: 2018-07-16

## 2018-07-16 RX ORDER — ONDANSETRON 2 MG/ML
4 INJECTION INTRAMUSCULAR; INTRAVENOUS ONCE
Status: COMPLETED | OUTPATIENT
Start: 2018-07-16 | End: 2018-07-16

## 2018-07-16 RX ADMIN — ONDANSETRON 4 MG: 2 INJECTION INTRAMUSCULAR; INTRAVENOUS at 13:50

## 2018-07-16 RX ADMIN — SODIUM CHLORIDE, SODIUM LACTATE, POTASSIUM CHLORIDE, CALCIUM CHLORIDE AND DEXTROSE MONOHYDRATE 1000 ML: 5; 600; 310; 30; 20 INJECTION, SOLUTION INTRAVENOUS at 13:44

## 2018-07-16 NOTE — PROGRESS NOTES
Pt here today for IV fluids for rehydration.  VSS  Tolerated fluids  Discharged will return as needed.

## 2018-07-16 NOTE — MR AVS SNAPSHOT
After Visit Summary   7/16/2018    Marylin Mejía    MRN: [id number]           Patient Information     Date Of Birth          1990        Visit Information        Provider Department      7/16/2018 1:30 PM LANGUAGE BANC; UR CH 03 [facility], Infusion Services        Today's Diagnoses     Nausea and vomiting in pregnancy prior to 22 weeks gestation    -  1       Follow-ups after your visit        Your next 10 appointments already scheduled     Jul 19, 2018  9:00 AM CDT   Level 2 with ALEXX CH 01   [facility], Infusion Services ([facility])    [address]  [address]  [facility] [address]   [phone]            Jul 20, 2018  8:00 AM CDT   Genetic Counseling with ALEXX GEN COUNSELOR 1   [facility] Maternal Fetal Medicine - [facility])    [address]  [facility] [address]   [phone]            Jul 20, 2018  8:45 AM CDT   MFM NUCHAL TRANSLUCENCY with ALEXXMFMUSR3   [facility] Maternal Fetal Medicine Ultrasound - [facility])    [address]  [facility] [id number] [phone]            Jul 20, 2018  9:15 AM CDT   Radiology MD with ALEXX NOWAK MD   [facility] Maternal Fetal Medicine - [facility])    [address]  [facility] [address]   [phone]           Please arrive at the time given for your first appointment. This visit is used internally to schedule the physician's time during your ultrasound.            Sep 07, 2018  8:45 AM CDT   MFM US COMP with ALEXXMFMUSR3   [facility] Maternal Fetal Medicine Ultrasound - [facility])    [address]  [facility] [id number] [phone]           Wear comfortable clothes and leave your valuables at home.            Sep 07, 2018  9:15 AM CDT   Radiology MD with ALEXX NOWAK MD   [facility]  "Maternal Fetal Medicine [facility] ([facility])    [address]  [facility] [place] [address]   [phone]           Please arrive at the time given for your first appointment. This visit is used internally to schedule the physician's time during your ultrasound.              Who to contact     If you have questions or need follow up information about today's clinic visit or your schedule please contact [facility], [place], [facility] SERVICES directly at [phone].  Normal or non-critical lab and imaging results will be communicated to you by FastDuehart, letter or phone within 4 business days after the clinic has received the results. If you do not hear from us within 7 days, please contact the clinic through FastDuehart or phone. If you have a critical or abnormal lab result, we will notify you by phone as soon as possible.  Submit refill requests through [employer] or call your pharmacy and they will forward the refill request to us. Please allow 3 business days for your refill to be completed.          Additional Information About Your Visit        FastDueharMobileDevHQ Information     [employer] lets you send messages to your doctor, view your test results, renew your prescriptions, schedule appointments and more. To sign up, go to [email or web address] . Click on \"Log in\" on the left side of the screen, which will take you to the Welcome page. Then click on \"Sign up Now\" on the right side of the page.     You will be asked to enter the access code listed below, as well as some personal information. Please follow the directions to create your username and password.     Your access code is: [email or web address]  Expires: 2018  3:58 PM     Your access code will  in 90 days. If you need help or a new code, please call your [place] clinic or [phone].        Care EveryWhere ID     This is your Care EveryWhere ID. This could be used by other organizations to access your [place] medical " records  [id number]        Your Vitals Were     Pulse Temperature Respirations Last Period Pulse Oximetry       72 98.3  F (36.8  C) (Oral) 16 04/22/2018 100%        Blood Pressure from Last 3 Encounters:   07/16/18 96/56   07/13/18 118/69   06/21/18 108/65    Weight from Last 3 Encounters:   06/21/18 39.2 kg (86 lb 8 oz)              Today, you had the following     No orders found for display       Primary Care Provider Office Phone # Fax #    Clinic [facility] [phone]       [address] [facility] [address]        Equal Access to Services     [facility] Coron[facility] HospitalCONCHITA : Hadii aad tray hadasho Somikelali, waaxda luqadaha, qaybta kaalmada adeegyada, michael lopez . So [facility] [phone].    ATENCIÓN: Si habla español, tiene a araujo disposición servicios gratuitos de asistencia lingüística. Llame al [phone].    We comply with applicable federal civil rights laws and [place] laws. We do not discriminate on the basis of race, color, national origin, age, disability, sex, sexual orientation, or gender identity.            Thank you!     Thank you for choosing [facility]  for your care. Our goal is always to provide you with excellent care. Hearing back from our patients is one way we can continue to improve our services. Please take a few minutes to complete the written survey that you may receive in the mail after your visit with us. Thank you!             Your Updated Medication List - Protect others around you: Learn how to safely use, store and throw away your medicines at www.disposemymeds.org.          This list is accurate as of 7/16/18  4:35 PM.  Always use your most recent med list.                   Brand Name Dispense Instructions for use Diagnosis    PRENATAL VITAMIN PO      Take 1 tablet by mouth daily        UNKNOWN TO PATIENT      Medication for nausea

## 2018-07-18 ENCOUNTER — PRE VISIT (OUTPATIENT)
Dept: MATERNAL FETAL MEDICINE | Facility: CLINIC | Age: 28
End: 2018-07-18

## 2018-07-19 ENCOUNTER — INFUSION THERAPY VISIT (OUTPATIENT)
Dept: INFUSION THERAPY | Facility: CLINIC | Age: 28
End: 2018-07-19
Attending: ADVANCED PRACTICE MIDWIFE
Payer: MEDICAID

## 2018-07-19 VITALS
RESPIRATION RATE: 16 BRPM | HEART RATE: 72 BPM | SYSTOLIC BLOOD PRESSURE: 107 MMHG | TEMPERATURE: 98.1 F | OXYGEN SATURATION: 100 % | DIASTOLIC BLOOD PRESSURE: 48 MMHG

## 2018-07-19 DIAGNOSIS — O21.9 NAUSEA AND VOMITING IN PREGNANCY PRIOR TO 22 WEEKS GESTATION: Primary | ICD-10-CM

## 2018-07-19 PROCEDURE — 25000128 H RX IP 250 OP 636: Performed by: ADVANCED PRACTICE MIDWIFE

## 2018-07-19 PROCEDURE — 96361 HYDRATE IV INFUSION ADD-ON: CPT

## 2018-07-19 PROCEDURE — T1013 SIGN LANG/ORAL INTERPRETER: HCPCS | Mod: U3

## 2018-07-19 PROCEDURE — 96374 THER/PROPH/DIAG INJ IV PUSH: CPT

## 2018-07-19 RX ORDER — ONDANSETRON 2 MG/ML
4 INJECTION INTRAMUSCULAR; INTRAVENOUS ONCE
Status: COMPLETED | OUTPATIENT
Start: 2018-07-19 | End: 2018-07-19

## 2018-07-19 RX ORDER — ONDANSETRON 2 MG/ML
INJECTION INTRAMUSCULAR; INTRAVENOUS
Status: DISCONTINUED
Start: 2018-07-19 | End: 2018-07-19 | Stop reason: HOSPADM

## 2018-07-19 RX ORDER — DEXTROSE, SODIUM CHLORIDE, SODIUM LACTATE, POTASSIUM CHLORIDE, AND CALCIUM CHLORIDE 5; .6; .31; .03; .02 G/100ML; G/100ML; G/100ML; G/100ML; G/100ML
1000 INJECTION, SOLUTION INTRAVENOUS ONCE
Status: COMPLETED | OUTPATIENT
Start: 2018-07-19 | End: 2018-07-19

## 2018-07-19 RX ORDER — DEXTROSE, SODIUM CHLORIDE, SODIUM LACTATE, POTASSIUM CHLORIDE, AND CALCIUM CHLORIDE 5; .6; .31; .03; .02 G/100ML; G/100ML; G/100ML; G/100ML; G/100ML
1000 INJECTION, SOLUTION INTRAVENOUS ONCE
Status: CANCELLED | OUTPATIENT
Start: 2018-07-19 | End: 2018-07-19

## 2018-07-19 RX ORDER — ONDANSETRON 2 MG/ML
4 INJECTION INTRAMUSCULAR; INTRAVENOUS ONCE
Status: CANCELLED | OUTPATIENT
Start: 2018-07-19 | End: 2018-07-19

## 2018-07-19 RX ADMIN — ONDANSETRON 4 MG: 2 INJECTION INTRAMUSCULAR; INTRAVENOUS at 09:16

## 2018-07-19 RX ADMIN — SODIUM CHLORIDE, SODIUM LACTATE, POTASSIUM CHLORIDE, CALCIUM CHLORIDE AND DEXTROSE MONOHYDRATE 1000 ML: 5; 600; 310; 30; 20 INJECTION, SOLUTION INTRAVENOUS at 09:15

## 2018-07-19 NOTE — MR AVS SNAPSHOT
After Visit Summary   7/19/2018    Marylin Mejía    MRN: [id number]           Patient Information     Date Of Birth          1990        Visit Information        Provider Department      7/19/2018 8:45 AM [place] LANGUAGE CONNECTION; UR CH 01 [facility] [place], Infusion Services        Today's Diagnoses     Nausea and vomiting in pregnancy prior to 22 weeks gestation    -  1      Care Instructions    Marylin has appointments tomorrow, 7/2/0/2018:    4th floor of Professional building    8am  Genetic counselor    8:45  Ultrsound    9:15 Dr. Sierra    Interpretor has also been scheduled.          Follow-ups after your visit        Your next 10 appointments already scheduled     Jul 20, 2018  8:00 AM CDT   Genetic Counseling with ALEXX GEN COUNSELOR 1   [facility] Maternal Fetal Medicine - [facility])    [address]  [facility] [address]   [phone]            Jul 20, 2018  8:45 AM CDT   M NUCHAL TRANSLUCENCY with URMFMUSR3   [facility] Maternal Fetal Medicine Ultrasound - [facility])    [address]  [facility] [address] [phone]            Jul 20, 2018  9:15 AM CDT   Radiology MD with ALEXX NOWAK MD   [facility] Maternal Fetal Medicine - [facility])    [address]  [facility] [address]   [phone]           Please arrive at the time given for your first appointment. This visit is used internally to schedule the physician's time during your ultrasound.            Sep 07, 2018  8:45 AM CDT   [facility] US COMP with URMFMUSR3   [facility] Maternal Fetal Medicine Ultrasound - [facility])    [address]  [facility] [address]   [phone]           Wear comfortable clothes and leave your valuables at home.            Sep 07, 2018  9:15 AM CDT   Radiology MD with ALEXX NOWAK MD   [facility]  "Maternal Fetal Medicine [facility]    [address]  [facility] [place] [address]   [phone]           Please arrive at the time given for your first appointment. This visit is used internally to schedule the physician's time during your ultrasound.              Who to contact     If you have questions or need follow up information about today's clinic visit or your schedule please contact [facility], [place], [facility] SERVICES directly at [phone].  Normal or non-critical lab and imaging results will be communicated to you by KipCallhart, letter or phone within 4 business days after the clinic has received the results. If you do not hear from us within 7 days, please contact the clinic through KipCallhart or phone. If you have a critical or abnormal lab result, we will notify you by phone as soon as possible.  Submit refill requests through [employer] or call your pharmacy and they will forward the refill request to us. Please allow 3 business days for your refill to be completed.          Additional Information About Your Visit        [email or web address] Information     [employer] lets you send messages to your doctor, view your test results, renew your prescriptions, schedule appointments and more. To sign up, go to [email or web address] . Click on \"Log in\" on the left side of the screen, which will take you to the Welcome page. Then click on \"Sign up Now\" on the right side of the page.     You will be asked to enter the access code listed below, as well as some personal information. Please follow the directions to create your username and password.     Your access code is: [email or web address]  Expires: 2018  3:58 PM     Your access code will  in 90 days. If you need help or a new code, please call your [place] clinic or [phone].        Care EveryWhere ID     This is your Care EveryWhere ID. This could be used by other organizations to access your [place] medical " records  [id number]        Your Vitals Were     Pulse Temperature Respirations Last Period Pulse Oximetry       76 98.1  F (36.7  C) 16 04/21/2018 98%        Blood Pressure from Last 3 Encounters:   07/19/18 104/57   07/16/18 96/56   07/13/18 118/69    Weight from Last 3 Encounters:   06/21/18 39.2 kg (86 lb 8 oz)              Today, you had the following     No orders found for display       Primary Care Provider Office Phone # Fax #    Clinic [facility] [phone]       [address] [facility] [address]        Equal Access to Services     LUNA ROSAS : Hadii aad ku hadasho Soomaali, waaxda luqadaha, qaybta kaalmada adeegyada, michael monk adejia lopez . So [facility] [phone].    ATENCIÓN: Si habla español, tiene a araujo disposición servicios gratuitos de asistencia lingüística. [facility] [phone].    We comply with applicable federal civil rights laws and [place] laws. We do not discriminate on the basis of race, color, national origin, age, disability, sex, sexual orientation, or gender identity.            Thank you!     Thank you for choosing [facility]  for your care. Our goal is always to provide you with excellent care. Hearing back from our patients is one way we can continue to improve our services. Please take a few minutes to complete the written survey that you may receive in the mail after your visit with us. Thank you!             Your Updated Medication List - Protect others around you: Learn how to safely use, store and throw away your medicines at www.disposemymeds.org.          This list is accurate as of 7/19/18  9:37 AM.  Always use your most recent med list.                   Brand Name Dispense Instructions for use Diagnosis    PRENATAL VITAMIN PO      Take 1 tablet by mouth daily        UNKNOWN TO PATIENT      Medication for nausea

## 2018-07-19 NOTE — PATIENT INSTRUCTIONS
Marylin has appointments tomorrow, 7/2/0/2018:    4th floor of Professional building    8am  Genetic counselor    8:45  Ultrsound    9:15 Dr. Sierra    Interpretor has also been scheduled.

## 2018-07-19 NOTE — PROGRESS NOTES
Infusion Nursing Note:  Marylin Mejía presents today for IVF, zofran.    Patient seen by provider today: No   present during visit today: Yes, Language: Amharic.     Note: C/O headache and abd pain.    Intravenous Access:  Peripheral IV placed.    Treatment Conditions:  Not Applicable.      Post Infusion Assessment:  Patient tolerated infusion without incident.  Blood return noted pre and post infusion.  Site patent and intact, free from redness, edema or discomfort.  No evidence of extravasations.  Access discontinued per protocol.    Discharge Plan:   Copy of AVS reviewed with patient and/or family.  Patient will return tomorrow to 4th floor [facility] clinic for next appointment.  Patient discharged in stable condition accompanied by:  and .  Departure Mode: Ambulatory.  Via  states she understands time/ place of tomorrow's appointment.    Leeann French RN

## 2018-07-20 ENCOUNTER — OFFICE VISIT (OUTPATIENT)
Dept: MATERNAL FETAL MEDICINE | Facility: CLINIC | Age: 28
End: 2018-07-20
Attending: ADVANCED PRACTICE MIDWIFE
Payer: MEDICAID

## 2018-07-20 ENCOUNTER — OFFICE VISIT (OUTPATIENT)
Dept: INTERPRETER SERVICES | Facility: CLINIC | Age: 28
End: 2018-07-20
Payer: MEDICAID

## 2018-07-20 ENCOUNTER — HOSPITAL ENCOUNTER (OUTPATIENT)
Dept: ULTRASOUND IMAGING | Facility: CLINIC | Age: 28
Discharge: HOME | End: 2018-07-20
Attending: ADVANCED PRACTICE MIDWIFE | Admitting: ADVANCED PRACTICE MIDWIFE
Payer: MEDICAID

## 2018-07-20 DIAGNOSIS — O26.90 PREGNANCY RELATED CONDITION, ANTEPARTUM: ICD-10-CM

## 2018-07-20 DIAGNOSIS — Z36.9 FIRST TRIMESTER SCREENING: ICD-10-CM

## 2018-07-20 DIAGNOSIS — Z36.82 ENCOUNTER FOR (NT) NUCHAL TRANSLUCENCY SCAN: Primary | ICD-10-CM

## 2018-07-20 DIAGNOSIS — Z36.9 FIRST TRIMESTER SCREENING: Primary | ICD-10-CM

## 2018-07-20 PROCEDURE — 76813 OB US NUCHAL MEAS 1 GEST: CPT

## 2018-07-20 PROCEDURE — 40000072 ZZH STATISTIC GENETIC COUNSELING, < 16 MIN: Mod: ZF | Performed by: GENETIC COUNSELOR, MS

## 2018-07-20 PROCEDURE — 82105 ALPHA-FETOPROTEIN SERUM: CPT | Performed by: OBSTETRICS & GYNECOLOGY

## 2018-07-20 PROCEDURE — T1013 SIGN LANG/ORAL INTERPRETER: HCPCS | Mod: U3,ZF

## 2018-07-20 PROCEDURE — 36415 COLL VENOUS BLD VENIPUNCTURE: CPT | Performed by: OBSTETRICS & GYNECOLOGY

## 2018-07-20 PROCEDURE — 84704 HCG FREE BETACHAIN TEST: CPT | Performed by: OBSTETRICS & GYNECOLOGY

## 2018-07-20 PROCEDURE — 84163 PAPPA SERUM: CPT | Performed by: OBSTETRICS & GYNECOLOGY

## 2018-07-20 NOTE — MR AVS SNAPSHOT
After Visit Summary   7/20/2018    Marylin Mejía    MRN: [id number]           Patient Information     Date Of Birth          1990        Visit Information        Provider Department      7/20/2018 9:15 AM Iván Sierra MD [facility] Maternal Fetal Medicine [facility]        Today's Diagnoses     Encounter for (NT) nuchal translucency scan    -  1       Follow-ups after your visit        Your next 10 appointments already scheduled     Jul 20, 2018 10:00 AM CDT   LAB with OP LAB UR   [facility] [place], Laboratory Services ([facility])    [address]  [facility] [address]   [phone]           Please do not eat 10-12 hours before your appointment if you are coming in fasting for labs on lipids, cholesterol, or glucose (sugar). This does not apply to pregnant women. Water, hot tea and black coffee (with nothing added) are okay. Do not drink other fluids, diet soda or chew gum.            Sep 07, 2018  8:45 AM CDT   MFVINAY US COMP with URMFMUSR3   [facility] Maternal Fetal Medicine Ultrasound - [facility])    [address]  [facility] [address]   [phone]           Wear comfortable clothes and leave your valuables at home.            Sep 07, 2018  9:15 AM CDT   Radiology MD with UR ELIU BURGOS   [facility] Maternal Fetal Medicine - [place] ([facility])    [address]  [facility] [address]   [phone]           Please arrive at the time given for your first appointment. This visit is used internally to schedule the physician's time during your ultrasound.              Future tests that were ordered for you today     Open Future Orders        Priority Expected Expires Ordered    First Trimester Screen Biochem Markers Routine  10/18/2018 7/20/2018            Who to contact     If you have questions or need follow up information about today's  "clinic visit or your schedule please contact [facility] MATERNAL FETAL MEDICINE [facility] directly at [phone].  Normal or non-critical lab and imaging results will be communicated to you by MyChart, letter or phone within 4 business days after the clinic has received the results. If you do not hear from us within 7 days, please contact the clinic through Graphene Frontiershart or phone. If you have a critical or abnormal lab result, we will notify you by phone as soon as possible.  Submit refill requests through [employer] or call your pharmacy and they will forward the refill request to us. Please allow 3 business days for your refill to be completed.          Additional Information About Your Visit        G[employer]hene Frontiershart Information     [employer] lets you send messages to your doctor, view your test results, renew your prescriptions, schedule appointments and more. To sign up, go to [email or web address] . Click on \"Log in\" on the left side of the screen, which will take you to the Welcome page. Then click on \"Sign up Now\" on the right side of the page.     You will be asked to enter the access code listed below, as well as some personal information. Please follow the directions to create your username and password.     Your access code is: [email or web address]  Expires: 2018  3:58 PM     Your access code will  in 90 days. If you need help or a new code, please call your [place] clinic or [phone].        Care EveryWhere ID     This is your Care EveryWhere ID. This could be used by other organizations to access your [place] medical records  [id number]        Your Vitals Were     Last Period                   2018            Blood Pressure from Last 3 Encounters:   18 107/48   18 96/56   18 118/69    Weight from Last 3 Encounters:   18 39.2 kg (86 lb 8 oz)              Today, you had the following     No orders found for display       Primary Care Provider Office Phone # Fax #    Clinic [facility] " [phone]       [address] [facility] [address]        Equal Access to Services     LUNA ROSAS : Hadii aad ku hadnarcisoterence Lulytrenton, wapierreda luqadaha, joanata kaalmada sharbimal, michael simeonin hayaajamil mylesjia mcleod laSarahusha rosado. So [facility] [phone].    ATENCIÓN: Si habla español, tiene a araujo disposición servicios gratuitos de asistencia lingüística. Llame al [phone].    We comply with applicable federal civil rights laws and [place] laws. We do not discriminate on the basis of race, color, national origin, age, disability, sex, sexual orientation, or gender identity.            Thank you!     Thank you for choosing [facility]  for your care. Our goal is always to provide you with excellent care. Hearing back from our patients is one way we can continue to improve our services. Please take a few minutes to complete the written survey that you may receive in the mail after your visit with us. Thank you!             Your Updated Medication List - Protect others around you: Learn how to safely use, store and throw away your medicines at www.disposemymeds.org.          This list is accurate as of 7/20/18  9:36 AM.  Always use your most recent med list.                   Brand Name Dispense Instructions for use Diagnosis    PRENATAL VITAMIN PO      Take 1 tablet by mouth daily        UNKNOWN TO PATIENT      Medication for nausea

## 2018-07-20 NOTE — PROGRESS NOTES
[facility]  Genetic Counseling Consult    Patient: Marylin Mejía YOB: 1990   Date of Service: 18      Marylin Mejía was seen at [facility] for genetic consultation to discuss the options for routine screening for fetal chromosome abnormalities. She was accompanied by her partner Liang. A , Yolanda, was present for the length of the session.       Impression/Plan:   1.  Marylin had an ultrasound and blood draw for first trimester screening.  Results are expected within 3-5 days, and will be available in [facility].  We will contact her to discuss the results, and a copy will be forwarded to the office of the referring OB provider.     2. Maternal serum AFP (single marker screen) is recommended after 15 weeks to screen for open neural tube defects. A quad screen should not be performed.    Pregnancy History:   /Parity:    Age at Delivery: 28 year old  KEN: 2/3/2019, by Ultrasound  Gestational Age: 11w5d    No significant complications or exposures were reported in the current pregnancy.    Medical History:   Marylin connor reported medical history is not expected to impact pregnancy management or risks to fetal development.       Family History:   A three-generation pedigree was obtained, and is scanned under the  Media  tab.   The following significant findings were reported by Marylin:    Marylin's mother had two miscarriages    The father of the pregnancy, Liang, Pennie, is healthy. He has one healthy 5 year old daughter from a previous relationship.     Otherwise, the reported family history is negative for multiple miscarriages, stillbirths, birth defects, mental retardation, known genetic conditions, and consanguinity.       Carrier Screening:   The patient reports that she and the father of the pregnancy have  ancestry:       Expanded carrier screening for mutations in a large panel of  genes associated with autosomal recessive conditions including cystic fibrosis, spinal muscular atrophy, and others, is now available.      The patient has declined the carrier screening options reviewed today.       Risk Assessment for Chromosome Conditions:   We explained that the risk for fetal chromosome abnormalities increases with maternal age. We discussed specific features of common chromosome abnormalities, including Down syndrome, trisomy 13, trisomy 18, and sex chromosome trisomies.      At age 28 at midtrimester, the risk to have a baby with Down syndrome is 1 in 990.    At age 28 at midtrimester, the risk to have a baby with any chromosome abnormality is 1 in 495.          Testing Options:   We discussed the following options:   First trimester screening    First trimester ultrasound with nuchal translucency and nasal bone assessments, maternal plasma hCG, EFREM-A, and AFP measurement    Screens for fetal trisomy 21, trisomy 13, and trisomy 18    Cannot screen for open neural tube defects; maternal serum AFP after 15 weeks is recommended     Non-invasive Prenatal Testing (NIPT)    Maternal plasma cell-free DNA testing; first trimester ultrasound with nuchal translucency and nasal bone assessment is recommended, when appropriate    Screens for fetal trisomy 21, trisomy 13, trisomy 18, and sex chromosome aneuploidy    Cannot screen for open neural tube defects; maternal serum AFP after 15 weeks is recommended     Genetic Amniocentesis    Invasive procedure typically performed in the second trimester by which amniotic fluid is obtained for the purpose of chromosome analysis and/or other prenatal genetic analysis    Diagnostic results; >99% sensitivity for fetal chromosome abnormalities    AFAFP measurement tests for open neural tube defects     Comprehensive (Level II) ultrasound: Detailed ultrasound performed between 18-22 weeks gestation to screen for major birth defects and markers  for aneuploidy.    We  reviewed the benefits and limitations of this testing.  Screening tests provide a risk assessment specific to the pregnancy for certain fetal chromosome abnormalities, but cannot definitively diagnose or exclude a fetal chromosome abnormality.  Follow-up genetic counseling and consideration of diagnostic testing is recommended with any abnormal screening result.      It was a pleasure to be involved with Marylin connor care. Face-to-face time of the meeting was 45 minutes.    Ashley Bonner MS  Genetic Counseling Intern  Phone: [phone]   Pager: [phone]    Attestation:  Patient seen, evaluated and discussed with the Genetic Counseling Intern. I have verified the content of the note, which accurately reflects my assessment of the patient and the plan of care.  Supervising Genetic Counselor  Krissy Lopez MS, [facility]  Maternal Fetal Medicine  [facility]  Phone:[phone]  Email: [email or web address]

## 2018-07-20 NOTE — PROGRESS NOTES
Please refer to ultrasound report under 'Imaging' Studies of 'Chart Review' tabs.    Iván Sierra M.D.

## 2018-07-20 NOTE — MR AVS SNAPSHOT
After Visit Summary   7/20/2018    Marylin Mejía    MRN: [id number]           Patient Information     Date Of Birth          1990        Visit Information        Provider Department      7/20/2018 8:00 AM UR GEN COUNSELOR 1 [facility] Maternal Fetal [facility]        Today's Diagnoses     First trimester screening    -  1    Pregnancy related condition, antepartum           Follow-ups after your visit        Your next 10 appointments already scheduled     Sep 07, 2018  8:45 AM CDT   MFM US COMP with [id number]   [facility] Maternal Fetal Medicine Ultrasound - [facility])    [address]  [facility] [id number] [phone]           Wear comfortable clothes and leave your valuables at home.            Sep 07, 2018  9:15 AM CDT   Radiology MD with UR ELIU BURGOS   [facility] Maternal Fetal Medicine - [facility])    [address]  [facility] [address] [phone]           Please arrive at the time given for your first appointment. This visit is used internally to schedule the physician's time during your ultrasound.              Who to contact     If you have questions or need follow up information about today's clinic visit or your schedule please contact [facility] MATERNAL FETAL MEDICINE [facility] directly at [phone].  Normal or non-critical lab and imaging results will be communicated to you by MyChart, letter or phone within 4 business days after the clinic has received the results. If you do not hear from us within 7 days, please contact the clinic through MyChart or phone. If you have a critical or abnormal lab result, we will notify you by phone as soon as possible.  Submit refill requests through CloudBees or call your pharmacy and they will forward the refill request to us. Please allow 3 business days for your refill to be completed.          Additional Information  "About Your Visit        Relativity Media PLhart Information     Transerv lets you send messages to your doctor, view your test results, renew your prescriptions, schedule appointments and more. To sign up, go to [email or web address] . Click on \"Log in\" on the left side of the screen, which will take you to the Welcome page. Then click on \"Sign up Now\" on the right side of the page.     You will be asked to enter the access code listed below, as well as some personal information. Please follow the directions to create your username and password.     Your access code is: [email or web address]  Expires: 2018  3:58 PM     Your access code will  in 90 days. If you need help or a new code, please call your [facility] clinic or [phone].        Care EveryWhere ID     This is your Care EveryWhere ID. This could be used by other organizations to access your [facility] medical records  [id number]        Your Vitals Were     Last Period                   2018            Blood Pressure from Last 3 Encounters:   18 107/48   18 96/56   18 118/69    Weight from Last 3 Encounters:   18 39.2 kg (86 lb 8 oz)              We Performed the Following     [facility] Genetic Counseling        Primary Care Provider Office Phone # Fax #    Clinic [facility] [phone]        [place] [address]        Equal Access to Services     LUNA ROSAS AH: Hadii aad ku hadasho Soomaali, waaxda luqadaha, qaybta kaalmada adeegyada, micahel bradley hayrosalindan edy lopez . So [facility] [phone].    ATENCIÓN: Si habla español, tiene a araujo disposición servicios gratuitos de asistencia lingüística. Llame al [phone].    We comply with applicable federal civil rights laws and [place] laws. We do not discriminate on the basis of race, color, national origin, age, disability, sex, sexual orientation, or gender identity.            Thank you!     Thank you for choosing [facility] " [place]  for your care. Our goal is always to provide you with excellent care. Hearing back from our patients is one way we can continue to improve our services. Please take a few minutes to complete the written survey that you may receive in the mail after your visit with us. Thank you!             Your Updated Medication List - Protect others around you: Learn how to safely use, store and throw away your medicines at www.disposemymeds.org.          This list is accurate as of 7/20/18 12:30 PM.  Always use your most recent med list.                   Brand Name Dispense Instructions for use Diagnosis    PRENATAL VITAMIN PO      Take 1 tablet by mouth daily        UNKNOWN TO PATIENT      Medication for nausea

## 2018-07-23 ENCOUNTER — APPOINTMENT (OUTPATIENT)
Dept: ULTRASOUND IMAGING | Facility: CLINIC | Age: 28
End: 2018-07-23
Attending: EMERGENCY MEDICINE
Payer: MEDICAID

## 2018-07-23 ENCOUNTER — HOSPITAL ENCOUNTER (EMERGENCY)
Facility: CLINIC | Age: 28
Discharge: HOME | End: 2018-07-23
Attending: EMERGENCY MEDICINE | Admitting: EMERGENCY MEDICINE
Payer: MEDICAID

## 2018-07-23 VITALS
DIASTOLIC BLOOD PRESSURE: 62 MMHG | HEART RATE: 82 BPM | SYSTOLIC BLOOD PRESSURE: 94 MMHG | OXYGEN SATURATION: 98 % | RESPIRATION RATE: 16 BRPM | WEIGHT: 81.1 LBS | TEMPERATURE: 98.1 F

## 2018-07-23 DIAGNOSIS — O21.9 NAUSEA AND VOMITING IN PREGNANCY: ICD-10-CM

## 2018-07-23 LAB
ALBUMIN SERPL-MCNC: 3.7 G/DL (ref 3.4–5)
ALBUMIN UR-MCNC: 30 MG/DL
ALP SERPL-CCNC: 50 U/L (ref 40–150)
ALT SERPL W P-5'-P-CCNC: 18 U/L (ref 0–50)
AMORPH CRY #/AREA URNS HPF: ABNORMAL /HPF
ANION GAP SERPL CALCULATED.3IONS-SCNC: 11 MMOL/L (ref 3–14)
APPEARANCE UR: ABNORMAL
AST SERPL W P-5'-P-CCNC: 17 U/L (ref 0–45)
BACTERIA #/AREA URNS HPF: ABNORMAL /HPF
BASOPHILS # BLD AUTO: 0 10E9/L (ref 0–0.2)
BASOPHILS NFR BLD AUTO: 0 %
BILIRUB SERPL-MCNC: 0.4 MG/DL (ref 0.2–1.3)
BILIRUB UR QL STRIP: NEGATIVE
BUN SERPL-MCNC: 7 MG/DL (ref 7–30)
CALCIUM SERPL-MCNC: 9.6 MG/DL (ref 8.5–10.1)
CHLORIDE SERPL-SCNC: 101 MMOL/L (ref 94–109)
CO2 SERPL-SCNC: 23 MMOL/L (ref 20–32)
COLOR UR AUTO: YELLOW
CREAT SERPL-MCNC: 0.41 MG/DL (ref 0.52–1.04)
DIFFERENTIAL METHOD BLD: ABNORMAL
EOSINOPHIL # BLD AUTO: 0 10E9/L (ref 0–0.7)
EOSINOPHIL NFR BLD AUTO: 0.1 %
ERYTHROCYTE [DISTWIDTH] IN BLOOD BY AUTOMATED COUNT: 12.7 % (ref 10–15)
GFR SERPL CREATININE-BSD FRML MDRD: >90 ML/MIN/1.7M2
GLUCOSE SERPL-MCNC: 74 MG/DL (ref 70–99)
GLUCOSE UR STRIP-MCNC: NEGATIVE MG/DL
HCG UR QL: POSITIVE
HCT VFR BLD AUTO: 34.2 % (ref 35–47)
HGB BLD-MCNC: 12 G/DL (ref 11.7–15.7)
HGB UR QL STRIP: NEGATIVE
IMM GRANULOCYTES # BLD: 0 10E9/L (ref 0–0.4)
IMM GRANULOCYTES NFR BLD: 0.1 %
INTERNAL QC OK POCT: YES
KETONES UR STRIP-MCNC: >150 MG/DL
LEUKOCYTE ESTERASE UR QL STRIP: NEGATIVE
LIPASE SERPL-CCNC: 124 U/L (ref 73–393)
LYMPHOCYTES # BLD AUTO: 1.6 10E9/L (ref 0.8–5.3)
LYMPHOCYTES NFR BLD AUTO: 18.3 %
MCH RBC QN AUTO: 30.5 PG (ref 26.5–33)
MCHC RBC AUTO-ENTMCNC: 35.1 G/DL (ref 31.5–36.5)
MCV RBC AUTO: 87 FL (ref 78–100)
MONOCYTES # BLD AUTO: 0.6 10E9/L (ref 0–1.3)
MONOCYTES NFR BLD AUTO: 6.3 %
MUCOUS THREADS #/AREA URNS LPF: PRESENT /LPF
NEUTROPHILS # BLD AUTO: 6.6 10E9/L (ref 1.6–8.3)
NEUTROPHILS NFR BLD AUTO: 75.2 %
NITRATE UR QL: NEGATIVE
NRBC # BLD AUTO: 0 10*3/UL
NRBC BLD AUTO-RTO: 0 /100
PH UR STRIP: 7 PH (ref 5–7)
PLATELET # BLD AUTO: 269 10E9/L (ref 150–450)
POTASSIUM SERPL-SCNC: 3.1 MMOL/L (ref 3.4–5.3)
PROT SERPL-MCNC: 8 G/DL (ref 6.8–8.8)
RBC # BLD AUTO: 3.94 10E12/L (ref 3.8–5.2)
RBC #/AREA URNS AUTO: 2 /HPF (ref 0–2)
SODIUM SERPL-SCNC: 135 MMOL/L (ref 133–144)
SOURCE: ABNORMAL
SP GR UR STRIP: 1.02 (ref 1–1.03)
SPECIMEN SOURCE: NORMAL
SQUAMOUS #/AREA URNS AUTO: 2 /HPF (ref 0–1)
UROBILINOGEN UR STRIP-MCNC: 2 MG/DL (ref 0–2)
WBC # BLD AUTO: 8.8 10E9/L (ref 4–11)
WBC #/AREA URNS AUTO: 2 /HPF (ref 0–5)
WET PREP SPEC: NORMAL

## 2018-07-23 PROCEDURE — 81025 URINE PREGNANCY TEST: CPT | Performed by: EMERGENCY MEDICINE

## 2018-07-23 PROCEDURE — 76700 US EXAM ABDOM COMPLETE: CPT

## 2018-07-23 PROCEDURE — 83690 ASSAY OF LIPASE: CPT | Performed by: EMERGENCY MEDICINE

## 2018-07-23 PROCEDURE — 87591 N.GONORRHOEAE DNA AMP PROB: CPT | Performed by: EMERGENCY MEDICINE

## 2018-07-23 PROCEDURE — 25000132 ZZH RX MED GY IP 250 OP 250 PS 637: Performed by: EMERGENCY MEDICINE

## 2018-07-23 PROCEDURE — 85025 COMPLETE CBC W/AUTO DIFF WBC: CPT | Performed by: EMERGENCY MEDICINE

## 2018-07-23 PROCEDURE — 25000128 H RX IP 250 OP 636: Performed by: EMERGENCY MEDICINE

## 2018-07-23 PROCEDURE — 87491 CHLMYD TRACH DNA AMP PROBE: CPT | Performed by: EMERGENCY MEDICINE

## 2018-07-23 PROCEDURE — 81001 URINALYSIS AUTO W/SCOPE: CPT | Performed by: EMERGENCY MEDICINE

## 2018-07-23 PROCEDURE — 80053 COMPREHEN METABOLIC PANEL: CPT | Performed by: EMERGENCY MEDICINE

## 2018-07-23 PROCEDURE — 99284 EMERGENCY DEPT VISIT MOD MDM: CPT | Mod: Z6 | Performed by: EMERGENCY MEDICINE

## 2018-07-23 PROCEDURE — 87210 SMEAR WET MOUNT SALINE/INK: CPT | Performed by: EMERGENCY MEDICINE

## 2018-07-23 PROCEDURE — 99284 EMERGENCY DEPT VISIT MOD MDM: CPT | Mod: 25 | Performed by: EMERGENCY MEDICINE

## 2018-07-23 PROCEDURE — 96361 HYDRATE IV INFUSION ADD-ON: CPT | Performed by: EMERGENCY MEDICINE

## 2018-07-23 PROCEDURE — 93976 VASCULAR STUDY: CPT | Mod: XS

## 2018-07-23 PROCEDURE — 96374 THER/PROPH/DIAG INJ IV PUSH: CPT | Performed by: EMERGENCY MEDICINE

## 2018-07-23 RX ORDER — ONDANSETRON 2 MG/ML
8 INJECTION INTRAMUSCULAR; INTRAVENOUS ONCE
Status: COMPLETED | OUTPATIENT
Start: 2018-07-23 | End: 2018-07-23

## 2018-07-23 RX ORDER — ONDANSETRON 4 MG/1
4 TABLET, ORALLY DISINTEGRATING ORAL EVERY 8 HOURS PRN
Qty: 10 TABLET | Refills: 0 | Status: SHIPPED | OUTPATIENT
Start: 2018-07-23 | End: 2018-07-26

## 2018-07-23 RX ORDER — SODIUM CHLORIDE 9 MG/ML
INJECTION, SOLUTION INTRAVENOUS
Status: DISCONTINUED
Start: 2018-07-23 | End: 2018-07-23 | Stop reason: HOSPADM

## 2018-07-23 RX ORDER — POTASSIUM CHLORIDE 750 MG/1
20 TABLET, EXTENDED RELEASE ORAL ONCE
Status: COMPLETED | OUTPATIENT
Start: 2018-07-23 | End: 2018-07-23

## 2018-07-23 RX ADMIN — SODIUM CHLORIDE 1000 ML: 9 INJECTION, SOLUTION INTRAVENOUS at 17:09

## 2018-07-23 RX ADMIN — ONDANSETRON 8 MG: 2 INJECTION INTRAMUSCULAR; INTRAVENOUS at 15:52

## 2018-07-23 RX ADMIN — SODIUM CHLORIDE 1000 ML: 9 INJECTION, SOLUTION INTRAVENOUS at 15:52

## 2018-07-23 RX ADMIN — POTASSIUM CHLORIDE 20 MEQ: 750 TABLET, FILM COATED, EXTENDED RELEASE ORAL at 18:59

## 2018-07-23 ASSESSMENT — ENCOUNTER SYMPTOMS
CHILLS: 0
VOMITING: 1
FEVER: 0
NAUSEA: 1
ABDOMINAL PAIN: 1

## 2018-07-23 NOTE — ED PROVIDER NOTES
History     Chief Complaint   Patient presents with     Nausea & Vomiting     vomiting for the past 2 days, scant amout of blood, has not been able to keep any food down, Pt is pregnant 12w 1d     HPI  Marylin Mejía is a 27 year old female who presents to the ED due to vomiting for the past 2 days. She is 12 weeks pregnant followed at [facility] clinic.  She says she was having a lot of vomiting issues in the first 2 months and had infusions at the clinic.  This settled down and they tried to start her on a daily vitamin.  The vitamins were upsetting her stomach and she stopped taking them again. Her last day she took a vitamin was this past Saturday.  At first she was vomiting up food but then started to notice some blood in her vomit.  No hx of ulcers.  She denies blood in her stool.  No f/c.  She has some pain that comes and goes on the lower abdomen, more on the right, but her OB provider said it was because of the baby growing that she was having some pain.  She is not having any vaginal bleeding or discharge.      I have reviewed the Medications, Allergies, Past Medical and Surgical History, and Social History in the Epic system.    Review of Systems   Constitutional: Negative for chills and fever.   Gastrointestinal: Positive for abdominal pain (lower abdomen), nausea and vomiting.   Genitourinary: Negative.    All other systems reviewed and are negative.      Physical Exam   BP: 90/59  Pulse: 87  Temp: 98.4  F (36.9  C)  Resp: 12  Weight: 36.8 kg (81 lb 1.6 oz)  SpO2: 98 %      Physical Exam   Constitutional: She is oriented to person, place, and time. She appears well-developed and well-nourished. No distress.   HENT:   Head: Normocephalic and atraumatic.   Right Ear: External ear normal.   Left Ear: External ear normal.   Nose: Nose normal.   Mouth/Throat: Oropharynx is clear and moist.   Eyes: EOM are normal. Pupils are equal, round, and reactive to light.   Neck: Normal range of motion. Neck supple.    Cardiovascular: Normal rate, regular rhythm and normal heart sounds.    Pulmonary/Chest: Effort normal and breath sounds normal.   Abdominal: Soft. Bowel sounds are normal.       Musculoskeletal: Normal range of motion.   Neurological: She is alert and oriented to person, place, and time.   Skin: Skin is warm and dry. She is not diaphoretic.   Psychiatric: She has a normal mood and affect.   Nursing note and vitals reviewed.      ED Course     ED Course     Procedures           Labs Ordered and Resulted from Time of ED Arrival Up to the Time of Departure from the ED   URINE MACROSCOPIC WITH REFLEX TO MICRO - Abnormal; Notable for the following:        Result Value    Ketones Urine >150 (*)     Protein Albumin Urine 30 (*)     Bacteria Urine Few (*)     Squamous Epithelial /HPF Urine 2 (*)     Mucous Urine Present (*)     Amorphous Crystals Few (*)     All other components within normal limits   CBC WITH PLATELETS DIFFERENTIAL - Abnormal; Notable for the following:     Hematocrit 34.2 (*)     All other components within normal limits   COMPREHENSIVE METABOLIC PANEL - Abnormal; Notable for the following:     Potassium 3.1 (*)     Creatinine 0.41 (*)     All other components within normal limits   HCG QUAL URINE POCT - Normal   LIPASE   WET PREP            Assessments & Plan (with Medical Decision Making)   The patient is 12 weeks pregnant and says she is having vomiting as well as right sided abdominal lower pain.  She says she was feeling better but feels that her vitamins are making her sick.  She denies vaginal discharge or bleeding.  An IV was placed and she was given zofran and IV fluids.  Labs were checked and her potassium was slightly low.  She was able to tolerate ora potassium after IV fluids and zofran. Pelvic exam was done and std check was done.  Wet prep is negative.  GC and chlamydia were sent.  I will not treat unless positive given no vaginal discharge and no CMT.  UA shows ketones but no uti.  UPT  was positive.  Abdominal ultrasound to evaluate gallbladder, kidney and appendix was ordered as well as ob ultrasound. Living IUP 12 weeks 6 dates was noted. No concerns noted on ultrasound.  She was feeling better and discharge to follow up with her OB provider.  zofran was prescribed at discharge.     I have reviewed the nursing notes.    I have reviewed the findings, diagnosis, plan and need for follow up with the patient.    Discharge Medication List as of 7/23/2018  6:55 PM      START taking these medications    Details   ondansetron (ZOFRAN ODT) 4 MG ODT tab Take 1 tablet (4 mg) by mouth every 8 hours as needed for nausea, Disp-10 tablet, R-0, Local Print             Final diagnoses:   Nausea and vomiting in pregnancy       7/23/2018   [facility] [place], EMERGENCY DEPARTMENT     Liz Lubin MD  08/23/18 8417

## 2018-07-23 NOTE — DISCHARGE INSTRUCTIONS
You can take zofran for nausea and vomiting if needed.     Please follow up with your OB provider this week for recheck.     Seek medical attention if worsening/concerns.

## 2018-07-23 NOTE — ED AVS SNAPSHOT
[facility], Emergency Department    [address]    [place] [address]    Phone:  [phone]    Fax:  [phone]                                       Marylin Mejía   MRN: [id number]    Department:  [facility], Emergency Department   Date of Visit:  7/23/2018           Patient Information     Date Of Birth          1990        Your diagnoses for this visit were:     Nausea and vomiting in pregnancy        You were seen by Liz Lubin MD.        Discharge Instructions       You can take zofran for nausea and vomiting if needed.     Please follow up with your OB provider this week for recheck.     Seek medical attention if worsening/concerns.     Your next 10 appointments already scheduled     Sep 07, 2018  8:45 AM CDT   MFM US COMP with [id number]   [facility] Maternal Fetal Medicine Ultrasound - [facility])    [address]  [facility] [address] [phone]           Wear comfortable clothes and leave your valuables at home.            Sep 07, 2018  9:15 AM CDT   Radiology MD with ALEXX NOWAK MD   [facility] Maternal Fetal Medicine - [facility])    [address]  [facility] [id number] [phone]           Please arrive at the time given for your first appointment. This visit is used internally to schedule the physician's time during your ultrasound.              24 Hour Appointment Hotline       To make an appointment at any [facility], call [phone] ([phone]). If you don't have a family doctor or clinic, we will help you find one. [facility] clinics are conveniently located to serve the needs of you and your family.             Review of your medicines      START taking        Dose / Directions Last dose taken    ondansetron 4 MG ODT tab   Commonly known as:  ZOFRAN ODT   Dose:  4 mg   Quantity:  10 tablet        Take 1 tablet (4 mg) by mouth every 8 hours as  needed for nausea   Refills:  0          Our records show that you are taking the medicines listed below. If these are incorrect, please call your family doctor or clinic.        Dose / Directions Last dose taken    PRENATAL VITAMIN PO   Dose:  1 tablet        Take 1 tablet by mouth daily   Refills:  0        UNKNOWN TO PATIENT        Medication for nausea   Refills:  0                Prescriptions were sent or printed at these locations (1 Prescription)                   Other Prescriptions                Printed at Department/Unit printer (1 of 1)         ondansetron (ZOFRAN ODT) 4 MG ODT tab                Procedures and tests performed during your visit     CBC with platelets differential    Chlamydia trachomatis PCR    Comprehensive metabolic panel    Lipase    Neisseria gonorrhoeae PCR    UA reflex to Microscopic    US Abdomen Complete    US OB < 14 Weeks Single    Wet prep    hCG qual urine POCT      Orders Needing Specimen Collection     None      Pending Results     Date and Time Order Name Status Description    7/23/2018 1542 Chlamydia trachomatis PCR In process     7/23/2018 1542 Neisseria gonorrhoeae PCR In process             Pending Culture Results     Date and Time Order Name Status Description    7/23/2018 1542 Chlamydia trachomatis PCR In process     7/23/2018 1542 Neisseria gonorrhoeae PCR In process             Pending Results Instructions     If you had any lab results that were not finalized at the time of your Discharge, you can call the ED Lab Result RN at [phone]. You will be contacted by this team for any positive Lab results or changes in treatment. The nurses are available 7 days a week from 10A to 6:30P.  You can leave a message 24 hours per day and they will return your call.        Thank you for choosing [facility]       Thank you for choosing [facility] for your care. Our goal is always to provide you with excellent care. Hearing back from our patients is one way we can continue to  "improve our services. Please take a few minutes to complete the written survey that you may receive in the mail after you visit with us. Thank you!        MoneyMan Information     MoneyMan lets you send messages to your doctor, view your test results, renew your prescriptions, schedule appointments and more. To sign up, go to [email or web address] . Click on \"Log in\" on the left side of the screen, which will take you to the Welcome page. Then click on \"Sign up Now\" on the right side of the page.     You will be asked to enter the access code listed below, as well as some personal information. Please follow the directions to create your username and password.     Your access code is: [email or web address]  Expires: 2018  3:58 PM     Your access code will  in 90 days. If you need help or a new code, please call your [facility] clinic or [phone].        Care EveryWhere ID     This is your Care EveryWhere ID. This could be used by other organizations to access your [facility] medical records  [id number]        Equal Access to Services     [facility]: Hadii debbie Pete, waaxda lusunitha, qaybgermain kaaldong cagle, michael lopez . So [facility] [phone].    ATENCIÓN: Si habla español, tiene a araujo disposición servicios gratuitos de asistencia lingüística. Luis al [phone].    We comply with applicable federal civil rights laws and [place] laws. We do not discriminate on the basis of race, color, national origin, age, disability, sex, sexual orientation, or gender identity.            After Visit Summary       This is your record. Keep this with you and show to your community pharmacist(s) and doctor(s) at your next visit.                  "

## 2018-07-23 NOTE — ED AVS SNAPSHOT
[facility] [place], Emergency Department    [address]    [facility] [address]    Phone:  [phone]    Fax:  [phone]                                       Marylin Mejía   MRN: [id number]    Department:  [facility], Emergency Department   Date of Visit:  7/23/2018           After Visit Summary Signature Page     I have received my discharge instructions, and my questions have been answered. I have discussed any challenges I see with this plan with the nurse or doctor.    ..........................................................................................................................................  Patient/Patient Representative Signature      ..........................................................................................................................................  Patient Representative Print Name and Relationship to Patient    ..................................................               ................................................  Date                                            Time    ..........................................................................................................................................  Reviewed by Signature/Title    ...................................................              ..............................................  Date                                                            Time

## 2018-07-24 ENCOUNTER — TELEPHONE (OUTPATIENT)
Dept: MATERNAL FETAL MEDICINE | Facility: CLINIC | Age: 28
End: 2018-07-24

## 2018-07-24 LAB
C TRACH DNA SPEC QL NAA+PROBE: NEGATIVE
N GONORRHOEA DNA SPEC QL NAA+PROBE: NEGATIVE
SPECIMEN SOURCE: NORMAL
SPECIMEN SOURCE: NORMAL

## 2018-07-24 NOTE — TELEPHONE ENCOUNTER
Talked with Marylin regarding her first trimester screening results. The phone call was assisted by , Amee.      These results indicated a 1 in >10,000 risk for Down syndrome and a 1 in >10,000 risk for trisomy 18/13.     The NT measurement was 1.6 mm, the nasal bone was present, the EFREM-A was 0.83 MoM and the free BhCG was 0.82 MoM.    This screening test gives information about the risk of some chromosome conditions in a pregnancy, but does not definitively diagnose or exclude the presence of these chromosome conditions.     A copy of these results are available in her EPIC chart for her primary OB to review.      Maternal serum AFP only is recommended in the second trimester to screen for neural tube defects.    Ashley Bonner MS  Genetic Counseling Intern  Phone: [phone]   Pager: [phone]

## 2018-07-25 LAB — LAB SCANNED RESULT: NORMAL

## 2018-09-07 ENCOUNTER — OFFICE VISIT (OUTPATIENT)
Dept: MATERNAL FETAL MEDICINE | Facility: CLINIC | Age: 28
End: 2018-09-07
Attending: ADVANCED PRACTICE MIDWIFE
Payer: COMMERCIAL

## 2018-09-07 ENCOUNTER — HOSPITAL ENCOUNTER (OUTPATIENT)
Dept: ULTRASOUND IMAGING | Facility: CLINIC | Age: 28
Discharge: HOME | End: 2018-09-07
Attending: ADVANCED PRACTICE MIDWIFE | Admitting: ADVANCED PRACTICE MIDWIFE
Payer: COMMERCIAL

## 2018-09-07 DIAGNOSIS — O26.90 PREGNANCY RELATED CONDITION, ANTEPARTUM: ICD-10-CM

## 2018-09-07 DIAGNOSIS — Z36.89 ENCOUNTER FOR FETAL ANATOMIC SURVEY: Primary | ICD-10-CM

## 2018-09-07 PROCEDURE — 76811 OB US DETAILED SNGL FETUS: CPT

## 2018-09-07 PROCEDURE — 76805 OB US >/= 14 WKS SNGL FETUS: CPT

## 2018-09-07 NOTE — PROGRESS NOTES
"Please see \"Imaging\" tab under \"Chart Review\" for details of today's visit.    Giulia Juarez    "

## 2018-09-07 NOTE — MR AVS SNAPSHOT
"              After Visit Summary   9/7/2018    Marylin Mejía    MRN: [id number]           Patient Information     Date Of Birth          1990        Visit Information        Provider Department      9/7/2018 9:15 AM Giulia Juarez MD [facility] Maternal Fetal Medicine [facility]        Today's Diagnoses     Encounter for fetal anatomic survey    -  1       Follow-ups after your visit        Future tests that were ordered for you today     Open Future Orders        Priority Expected Expires Ordered    Maternal Fetal OB Complete 2/3 Tri Sngle Routine  5/5/2019 7/5/2018            Who to contact     If you have questions or need follow up information about today's clinic visit or your schedule please contact [facility] MATERNAL FETAL MEDICINE [facility] directly at [phone].  Normal or non-critical lab and imaging results will be communicated to you by PostalGuardhart, letter or phone within 4 business days after the clinic has received the results. If you do not hear from us within 7 days, please contact the clinic through PostalGuardhart or phone. If you have a critical or abnormal lab result, we will notify you by phone as soon as possible.  Submit refill requests through Mapittrackit or call your pharmacy and they will forward the refill request to us. Please allow 3 business days for your refill to be completed.          Additional Information About Your Visit        PostalGuardharLymbix Information     Mapittrackit lets you send messages to your doctor, view your test results, renew your prescriptions, schedule appointments and more. To sign up, go to [email or web address] . Click on \"Log in\" on the left side of the screen, which will take you to the Welcome page. Then click on \"Sign up Now\" on the right side of the page.     You will be asked to enter the access code listed below, as well as some personal information. Please follow the directions to create your username and password.     Your access code is: [email or web address]  Expires: " 2018  3:58 PM     Your access code will  in 90 days. If you need help or a new code, please call your [facility] clinic or [phone].        Care EveryWhere ID     This is your Care EveryWhere ID. This could be used by other organizations to access your [facility] medical records  [id number]        Your Vitals Were     Last Period                   2018            Blood Pressure from Last 3 Encounters:   18 94/62   18 107/48   18 96/56    Weight from Last 3 Encounters:   18 36.8 kg (81 lb 1.6 oz)   18 39.2 kg (86 lb 8 oz)              Today, you had the following     No orders found for display       Primary Care Provider Office Phone # Fax #    Clinic [facility] [phone]        [facility] [address]        Equal Access to Services     LUNA ROSAS : Hadii aad ku hadasho Somikelali, waaxda luqadaha, qaybta kaalmada adeegyada, waxay simeonin hayrosalindan edy lopez . So [facility] [phone].    ATENCIÓN: Si habla español, tiene a araujo disposición servicios gratuitos de asistencia lingüística. Llame al [phone].    We comply with applicable federal civil rights laws and [place] laws. We do not discriminate on the basis of race, color, national origin, age, disability, sex, sexual orientation, or gender identity.            Thank you!     Thank you for choosing [facility]  for your care. Our goal is always to provide you with excellent care. Hearing back from our patients is one way we can continue to improve our services. Please take a few minutes to complete the written survey that you may receive in the mail after your visit with us. Thank you!             Your Updated Medication List - Protect others around you: Learn how to safely use, store and throw away your medicines at www.disposemymeds.org.          This list is accurate as of 18  9:41 AM.  Always use your most recent med list.                    Brand Name Dispense Instructions for use Diagnosis    PRENATAL VITAMIN PO      Take 1 tablet by mouth daily        UNKNOWN TO PATIENT      Medication for nausea

## 2018-10-30 ENCOUNTER — OFFICE VISIT (OUTPATIENT)
Dept: INTERPRETER SERVICES | Facility: CLINIC | Age: 28
End: 2018-10-30
Payer: COMMERCIAL

## 2018-10-30 ENCOUNTER — HOSPITAL ENCOUNTER (OUTPATIENT)
Facility: CLINIC | Age: 28
Discharge: HOME | End: 2018-10-30
Attending: ADVANCED PRACTICE MIDWIFE | Admitting: ADVANCED PRACTICE MIDWIFE
Payer: COMMERCIAL

## 2018-10-30 VITALS — DIASTOLIC BLOOD PRESSURE: 54 MMHG | SYSTOLIC BLOOD PRESSURE: 95 MMHG | TEMPERATURE: 97.8 F | RESPIRATION RATE: 20 BRPM

## 2018-10-30 PROCEDURE — G0463 HOSPITAL OUTPT CLINIC VISIT: HCPCS | Mod: 25

## 2018-10-30 PROCEDURE — T1013 SIGN LANG/ORAL INTERPRETER: HCPCS | Mod: U3

## 2018-10-30 PROCEDURE — 59025 FETAL NON-STRESS TEST: CPT

## 2018-10-30 NOTE — PLAN OF CARE
Problem: Patient Care Overview  Goal: Plan of Care/Patient Progress Review  Outcome: Therapy, progress toward functional goals as expected  Data: Patient [facility] the [facility] at 1315.   Reason for maternal/fetal assessment per patient is Decreased Fetal Movement  . Patient is a . Prenatal record reviewed.      Obstetric History       T0      L0     SAB0   TAB0   Ectopic0   Multiple0   Live Births0       # Outcome Date GA Lbr Arie/2nd Weight Sex Delivery Anes PTL Lv   1 Current                  Medical History: History reviewed. No pertinent past medical history.. Gestational Age 26w2d. VSS. Cervix: not examined.  Fetal movement present. Patient denies backache, vaginal discharge, pelvic pressure, UTI symptoms, GI problems, bloody show, vaginal bleeding, edema, headache, visual disturbances, epigastric or URQ pain, rupture of membranes.   Action: Verbal consent for EFM. Triage assessment completed. EFM and [place] applied. Fetal assessment: Presumed adequate fetal oxygenation documented (see flow record). Patient instructed to report change in fetal movement, vaginal leaking of fluid or bleeding, abdominal pain, or any concerns related to the pregnancy to her nurse/physician.   Response: JAIRO Parisi/MARY Smith informed of patient arrival. Plan per provider is discharge home. Patient verbalized understanding of education and verbalized agreement with plan. Discharged ambulatory at 1552.

## 2018-10-30 NOTE — IP AVS SNAPSHOT
MRN:[id number]                      After Visit Summary   10/30/2018    Marylin Mejía    MRN: [id number]           Thank you!     Thank you for choosing [facility] for your care. Our goal is always to provide you with excellent care. Hearing back from our patients is one way we can continue to improve our services. Please take a few minutes to complete the written survey that you may receive in the mail after you visit with us. Thank you!        Patient Information     Date Of Birth          1990        Designated Caregiver       Most Recent Value    Caregiver    Will someone help with your care after discharge? no      About your hospital stay     You were admitted on:  October 30, 2018 You last received care in the:  [facility]    You were discharged on:  October 30, 2018       Who to Call     For medical emergencies, please call 911.  For non-urgent questions about your medical care, please call your primary care provider or clinic, [phone]          Attending Provider     Provider Specialty    Maris Smith APRN CNM Midwives       Primary Care Provider Office Phone # Fax #    [facility] [phone]      Further instructions from your care team       Discharge Instruction for Undelivered Patients      You were seen for: Fetal Assessment  We Consulted: Maris Smith CNM and ARVIND student (Isak)  You had (Test or Medicine):***     Diet:   Drink 8 to 12 glasses of liquids (milk, juice, water) every day.  You may eat meals and snacks.     Activity:  Call your doctor or nurse midwife if your baby is moving less than usual.     Call your provider if you notice:  Swelling in your face or increased swelling in your hands or legs.  Headaches that are not relieved by Tylenol (acetaminophen).  Changes in your vision (blurring: seeing spots or stars.)  Nausea (sick to your stomach) and vomiting (throwing up).   Weight gain of 5 pounds or more per week.  Heartburn that  "doesn't go away.  Signs of bladder infection: pain when you urinate (use the toilet), need to go more often and more urgently.  The bag of tam (rupture of membranes) breaks, or you notice leaking in your underwear.  Bright red blood in your underwear.  Abdominal (lower belly) or stomach pain.  *If less than 34 weeks: Contractions (tightenings) more than 6 times in one hour.  Increase or change in vaginal discharge (note the color and amount)      Follow-up:  As scheduled in the clinic          Pending Results     No orders found from 10/28/2018 to 10/31/2018.            Statement of Approval     Ordered          10/30/18 1532  I have reviewed and agree with all the recommendations and orders detailed in this document.  EFFECTIVE NOW     Approved and electronically signed by:  Corie Parisi CNM             Admission Information     Date & Time Provider Department Dept. Phone    10/30/2018 Maris Smith APRN CNM [facility] [phone]      Your Vitals Were     Blood Pressure Temperature Respirations Last Period           97.8  F (36.6  C) (Oral) 20 2018        MyChart Information     [email or web address] lets you send messages to your doctor, view your test results, renew your prescriptions, schedule appointments and more. To sign up, go to [email or web address] . Click on \"Log in\" on the left side of the screen, which will take you to the Welcome page. Then click on \"Sign up Now\" on the right side of the page.     You will be asked to enter the access code listed below, as well as some personal information. Please follow the directions to create your username and password.     Your access code is: [email or web address]  Expires: 2019  3:36 PM     Your access code will  in 90 days. If you need help or a new code, please call your [place] clinic or [phone].        Care EveryWhere ID     This is your Care EveryWhere ID. This could be used by other organizations to access your [place] medical " records  [id number]        Equal Access to Services     [facility] ALISON : Hadsusan Pete, walaura spencer, qakendallgermain cagle, michael rosado. So [facility] [phone].    ATENCIÓN: Si habla español, tiene a araujo disposición servicios gratuitos de asistencia lingüística. Llame al [phone].    We comply with applicable federal civil rights laws and [place] laws. We do not discriminate on the basis of race, color, national origin, age, disability, sex, sexual orientation, or gender identity.               Review of your medicines      UNREVIEWED medicines. Ask your doctor about these medicines        Dose / Directions    PRENATAL VITAMIN PO        Dose:  1 tablet   Take 1 tablet by mouth daily   Refills:  0       UNKNOWN TO PATIENT        Medication for nausea   Refills:  0       ZANTAC PO        Dose:  150 mg   Take 150 mg by mouth At Bedtime   Refills:  0                Protect others around you: Learn how to safely use, store and throw away your medicines at www.disposemymeds.org.             Medication List: This is a list of all your medications and when to take them. Check marks below indicate your daily home schedule. Keep this list as a reference.      Medications           Morning Afternoon Evening Bedtime As Needed    PRENATAL VITAMIN PO   Take 1 tablet by mouth daily                                UNKNOWN TO PATIENT   Medication for nausea                                ZANTAC PO   Take 150 mg by mouth At Bedtime

## 2018-10-30 NOTE — IP AVS SNAPSHOT
[facility]    [address]    [place] [address]    Phone:  [phone]                                       After Visit Summary   10/30/2018    Marylin Mejía    MRN: [id number]           After Visit Summary Signature Page     I have received my discharge instructions, and my questions have been answered. I have discussed any challenges I see with this plan with the nurse or doctor.    ..........................................................................................................................................  Patient/Patient Representative Signature      ..........................................................................................................................................  Patient Representative Print Name and Relationship to Patient    ..................................................               ................................................  Date                                   Time    ..........................................................................................................................................  Reviewed by Signature/Title    ...................................................              ..............................................  Date                                               Time          22EPIC Rev 08/18

## 2018-10-30 NOTE — PLAN OF CARE
D: Patient presents to labor and delivery stating she was at work on Sunday evening and she bumped her stomach while reached for a item on the shelf. After bumping her stomach she experienced pain and felt the baby was moving less than normal. Admission completed, placed on the monitor, and Maris Smith CNM notified of patient arrival. P: Continue to monitor.

## 2018-10-30 NOTE — DISCHARGE INSTRUCTIONS
Discharge Instruction for Undelivered Patients      You were seen for: Fetal Assessment  We Consulted: Maris Smith CNM and CNM student (Isak)  You had (Test or Medicine):***     Diet:   Drink 8 to 12 glasses of liquids (milk, juice, water) every day.  You may eat meals and snacks.     Activity:  Call your doctor or nurse midwife if your baby is moving less than usual.     Call your provider if you notice:  Swelling in your face or increased swelling in your hands or legs.  Headaches that are not relieved by Tylenol (acetaminophen).  Changes in your vision (blurring: seeing spots or stars.)  Nausea (sick to your stomach) and vomiting (throwing up).   Weight gain of 5 pounds or more per week.  Heartburn that doesn't go away.  Signs of bladder infection: pain when you urinate (use the toilet), need to go more often and more urgently.  The bag of tam (rupture of membranes) breaks, or you notice leaking in your underwear.  Bright red blood in your underwear.  Abdominal (lower belly) or stomach pain.  *If less than 34 weeks: Contractions (tightenings) more than 6 times in one hour.  Increase or change in vaginal discharge (note the color and amount)      Follow-up:  As scheduled in the clinic

## 2018-11-12 LAB — GLU GEST SCREEN 1HR 50G: 97

## 2018-11-30 ENCOUNTER — HOSPITAL ENCOUNTER (OUTPATIENT)
Facility: CLINIC | Age: 28
Discharge: HOME | End: 2018-11-30
Attending: ADVANCED PRACTICE MIDWIFE | Admitting: ADVANCED PRACTICE MIDWIFE
Payer: COMMERCIAL

## 2018-11-30 VITALS — SYSTOLIC BLOOD PRESSURE: 115 MMHG | DIASTOLIC BLOOD PRESSURE: 69 MMHG | TEMPERATURE: 98.1 F | RESPIRATION RATE: 18 BRPM

## 2018-11-30 PROCEDURE — 40000268 ZZH STATISTIC NO CHARGES

## 2018-11-30 PROCEDURE — 59025 FETAL NON-STRESS TEST: CPT

## 2018-11-30 PROCEDURE — G0463 HOSPITAL OUTPT CLINIC VISIT: HCPCS | Mod: 25

## 2018-11-30 ASSESSMENT — ACTIVITIES OF DAILY LIVING (ADL)
COGNITION: 0 - NO COGNITION ISSUES REPORTED
FALL_HISTORY_WITHIN_LAST_SIX_MONTHS: NO
TOILETING: 0-->INDEPENDENT
DRESS: 0-->INDEPENDENT
TRANSFERRING: 0-->INDEPENDENT
AMBULATION: 0-->INDEPENDENT
BATHING: 0-->INDEPENDENT
RETIRED_EATING: 0-->INDEPENDENT
RETIRED_COMMUNICATION: 0-->UNDERSTANDS/COMMUNICATES WITHOUT DIFFICULTY
SWALLOWING: 0-->SWALLOWS FOODS/LIQUIDS WITHOUT DIFFICULTY

## 2018-11-30 NOTE — PLAN OF CARE
Data: [facility] at 0230.   Reason for maternal/fetal assessment per patient is Vaginal Bleeding  . Patient is a . Prenatal record reviewed.      Obstetric History       T0      L0     SAB0   TAB0   Ectopic0   Multiple0   Live Births0       # Outcome Date GA Lbr Arie/2nd Weight Sex Delivery Anes PTL Lv   1 Current                  Medical History: History reviewed. No pertinent past medical history.. Gestational Age 30w5d. VSS. Cervix: not examined.  Fetal movement present. Patient denies cramping, vaginal discharge, pelvic pressure, UTI symptoms, GI problems, bloody show, edema, headache, visual disturbances, epigastric or URQ pain, abdominal pain, rupture of membranes. Support persons Felipe present.  Action: Verbal consent for EFM. Triage assessment completed. EFM applied for FHT. Uterine assessment for contractions. Fetal assessment: Presumed adequate fetal oxygenation documented (see flow record). Patient instructed to report change in fetal movement, vaginal leaking of fluid or bleeding, abdominal pain, or any concerns related to the pregnancy to her nurse/physician.   Response: Prerna Milton CNM informed of pt. Arrival and chief complaint. Plan per provider is discharge home and follow up in clinic as scheduled. Ipad  used during entire visit and for discharge instructions. Patient verbalized understanding of education and verbalized agreement with plan. Discharged ambulatory at 0352.

## 2018-11-30 NOTE — IP AVS SNAPSHOT
MRN:[id number]                      After Visit Summary   11/30/2018    Marylin Mejía    MRN: [id number]           Thank you!     Thank you for choosing [facility] for your care. Our goal is always to provide you with excellent care. Hearing back from our patients is one way we can continue to improve our services. Please take a few minutes to complete the written survey that you may receive in the mail after you visit with us. Thank you!        Patient Information     Date Of Birth          1990        Designated Caregiver       Most Recent Value    Caregiver    Will someone help with your care after discharge? no      About your hospital stay     You were admitted on:  November 30, 2018 You last received care in the:  [facility]    You were discharged on:  November 30, 2018       Who to Call     For medical emergencies, please call 911.  For non-urgent questions about your medical care, please call your primary care provider or clinic, [phone]          Attending Provider     Provider Specialty    Prerna Milton APRN CNM Midwives       Primary Care Provider Office Phone # Fax #    [facility] [phone]      Further instructions from your care team       Discharge Instruction for Undelivered Patients      You were seen for: Bleeding Assessment  We Consulted: Prerna Milton CNM  You had (Test or Medicine): Non-Stress Test     Diet:   As tolerated     Activity:  As tolerated     Call your provider if you notice:  Swelling in your face or increased swelling in your hands or legs.  Headaches that are not relieved by Tylenol (acetaminophen).  Changes in your vision (blurring: seeing spots or stars.)  Nausea (sick to your stomach) and vomiting (throwing up).   Weight gain of 5 pounds or more per week.  Heartburn that doesn't go away.  Signs of bladder infection: pain when you urinate (use the toilet), need to go more often and more urgently.  The bag of tam (rupture of  "membranes) breaks, or you notice leaking in your underwear.  Bright red blood in your underwear.  Abdominal (lower belly) or stomach pain.  For first baby: Contractions (tightening) less than 5 minutes apart for one hour or more.  Second (plus) baby: Contractions (tightening) less than 10 minutes apart and getting stronger.  *If less than 34 weeks: Contractions (tightenings) more than 6 times in one hour.  Increase or change in vaginal discharge (note the color and amount)      Follow-up:  As scheduled in the clinic               To whom it may concern:    Marylin Mejía was seen at the [facility] on  by Prerna Milton CNM.          Pending Results     No orders found from 2018 to 2018.            Statement of Approval     Ordered          18 0326  I have reviewed and agree with all the recommendations and orders detailed in this document.  EFFECTIVE NOW     Approved and electronically signed by:  Prerna Milton APRN CNM             Admission Information     Date & Time Provider Department Dept. Phone    2018 Prerna Milton APRN CNM [facility] [phone]      Your Vitals Were     Blood Pressure Temperature Respirations Last Period          115/69 98.1  F (36.7  C) (Oral) 18 2018        [employer] Information     [employer] lets you send messages to your doctor, view your test results, renew your prescriptions, schedule appointments and more. To sign up, go to [email or web address] . Click on \"Log in\" on the left side of the screen, which will take you to the Welcome page. Then click on \"Sign up Now\" on the right side of the page.     You will be asked to enter the access code listed below, as well as some personal information. Please follow the directions to create your username and password.     Your access code is: [email or web address]  Expires: 2019  2:36 PM     Your access code will  in 90 days. If you need help or a new code, " please call your [place] clinic or [phone].        Care EveryWhere ID     This is your Care EveryWhere ID. This could be used by other organizations to access your [place] medical records  [id number]        Equal Access to Services     LUNA ROSAS : Hadii aad ku hadnarcisoterence Somikelali, waaxda luqadaha, qaybta kaalmada adeegyada, michael calzadajamil snow kevensunita rosado. So [facility] [phone].    ATENCIÓN: Si habla español, tiene a araujo disposición servicios gratuitos de asistencia lingüística. Llame al [phone].    We comply with applicable federal civil rights laws and [place] laws. We do not discriminate on the basis of race, color, national origin, age, disability, sex, sexual orientation, or gender identity.               Review of your medicines      UNREVIEWED medicines. Ask your doctor about these medicines        Dose / Directions    PRENATAL VITAMIN PO        Dose:  1 tablet   Take 1 tablet by mouth daily   Refills:  0       UNKNOWN TO PATIENT        Medication for nausea   Refills:  0       ZANTAC PO        Dose:  150 mg   Take 150 mg by mouth At Bedtime   Refills:  0                Protect others around you: Learn how to safely use, store and throw away your medicines at www.disposemymeds.org.             Medication List: This is a list of all your medications and when to take them. Check marks below indicate your daily home schedule. Keep this list as a reference.      Medications           Morning Afternoon Evening Bedtime As Needed    PRENATAL VITAMIN PO   Take 1 tablet by mouth daily                                UNKNOWN TO PATIENT   Medication for nausea                                ZANTAC PO   Take 150 mg by mouth At Bedtime

## 2018-11-30 NOTE — ED NOTES
Pt arrived to ED with complaint of vaginal bleed .  Pt reports 33 weeks pregnant.   Pt is having contractions No.   Pt feels urge to push No.   Pt reports water broke No.   Report was called and pt was transferred to L&D Yes.     Assessed by Dr. Humphries

## 2018-11-30 NOTE — IP AVS SNAPSHOT
[facility]    [address]    [place] [address]    Phone:  [phone]                                       After Visit Summary   11/30/2018    Marylin Mejía    MRN: [id number]           After Visit Summary Signature Page     I have received my discharge instructions, and my questions have been answered. I have discussed any challenges I see with this plan with the nurse or doctor.    ..........................................................................................................................................  Patient/Patient Representative Signature      ..........................................................................................................................................  Patient Representative Print Name and Relationship to Patient    ..................................................               ................................................  Date                                   Time    ..........................................................................................................................................  Reviewed by Signature/Title    ...................................................              ..............................................  Date                                               Time          22EPIC Rev 08/18

## 2018-11-30 NOTE — DISCHARGE INSTRUCTIONS
Discharge Instruction for Undelivered Patients      You were seen for: Bleeding Assessment  We Consulted: Prerna Milton CNM  You had (Test or Medicine): Non-Stress Test     Diet:   As tolerated     Activity:  As tolerated     Call your provider if you notice:  Swelling in your face or increased swelling in your hands or legs.  Headaches that are not relieved by Tylenol (acetaminophen).  Changes in your vision (blurring: seeing spots or stars.)  Nausea (sick to your stomach) and vomiting (throwing up).   Weight gain of 5 pounds or more per week.  Heartburn that doesn't go away.  Signs of bladder infection: pain when you urinate (use the toilet), need to go more often and more urgently.  The bag of tam (rupture of membranes) breaks, or you notice leaking in your underwear.  Bright red blood in your underwear.  Abdominal (lower belly) or stomach pain.  For first baby: Contractions (tightening) less than 5 minutes apart for one hour or more.  Second (plus) baby: Contractions (tightening) less than 10 minutes apart and getting stronger.  *If less than 34 weeks: Contractions (tightenings) more than 6 times in one hour.  Increase or change in vaginal discharge (note the color and amount)      Follow-up:  As scheduled in the clinic               To whom it may concern:    Marylin Uriel Mejía was seen at the [facility] on November, 30th 2018 by Prerna Milton CNM.

## 2019-01-07 LAB — GROUP B STREP PCR: NEGATIVE

## 2019-01-15 ENCOUNTER — TELEPHONE (OUTPATIENT)
Dept: OBGYN | Facility: CLINIC | Age: 29
End: 2019-01-15

## 2019-01-15 NOTE — TELEPHONE ENCOUNTER
----- Message from Becky Brandon sent at 1/15/2019  8:10 AM CST -----  Regarding: FW: ECV scheduling      ----- Message -----  From: Maris Smith APRN CNM  Sent: 1/14/2019   4:53 PM  To: [facility]  Subject: ECV scheduling                                   [facility],    This [facility] pt needs to be scheduled for an ECV for breech this Thursday or Friday if possible. Her best contact number is [phone] and she requires a . She is 37+1 today.     Thanks!  Maris  
Scheduled version for this Thursday 1/17/19 at 8 am with charge nurse at [facility].      Attempted to reach Marylin with  and both numbers we tried were not accepting calls.    Spoke to OB nurse, Jennifer, at [facility] clinic and she had a different number to try. ([phone])She has the version information so will contact Marylin and instruct to call [facility] Thursday am before leaving and NPO status after midnight  She indicated understanding and agreed with plan.      
spouse

## 2019-01-17 ENCOUNTER — OFFICE VISIT (OUTPATIENT)
Dept: INTERPRETER SERVICES | Facility: CLINIC | Age: 29
End: 2019-01-17
Payer: COMMERCIAL

## 2019-01-17 ENCOUNTER — HOSPITAL ENCOUNTER (OUTPATIENT)
Facility: CLINIC | Age: 29
Discharge: HOME | End: 2019-01-17
Attending: OBSTETRICS & GYNECOLOGY | Admitting: OBSTETRICS & GYNECOLOGY
Payer: COMMERCIAL

## 2019-01-17 PROBLEM — Z36.89 ENCOUNTER FOR TRIAGE IN PREGNANT PATIENT: Status: ACTIVE | Noted: 2018-10-30

## 2019-01-17 PROCEDURE — G0463 HOSPITAL OUTPT CLINIC VISIT: HCPCS | Mod: 25

## 2019-01-17 PROCEDURE — 59025 FETAL NON-STRESS TEST: CPT

## 2019-01-17 PROCEDURE — T1013 SIGN LANG/ORAL INTERPRETER: HCPCS | Mod: U3

## 2019-01-17 NOTE — DISCHARGE INSTRUCTIONS
Discharge Instruction for Undelivered Patients      You were seen for: Version (turning the baby)  We Consulted: Dr Swanson  You had (Test or Medicine): Fetal monitoring     Diet:   Drink 8 to 12 glasses of liquids (milk, juice, water) every day.  You may eat meals and snacks.     Activity:  Count fetal kicks everyday (see handout)  Call your doctor or nurse midwife if your baby is moving less than usual.     Call your provider if you notice:    Signs of bladder infection: pain when you urinate (use the toilet), need to go more often and more urgently.  The bag of tam (rupture of membranes) breaks, or you notice leaking in your underwear.  Bright red blood in your underwear.  Abdominal (lower belly) or stomach pain.  For first baby: Contractions (tightening) less than 5 minutes apart for one hour or more.  Second (plus) baby: Contractions (tightening) less than 10 minutes apart and getting stronger.  Increase or change in vaginal discharge (note the color and amount)      Follow-up:  As scheduled in the clinic

## 2019-01-17 NOTE — PROGRESS NOTES
Triage Visit    S: Patient is a 28 year old  @37w4d presenting for ECV. Was found to be breech on Monday at routine PNC. Reports movement is decreased over past few days. Having 1-2 contractions per day. No leaking fluid, vaginal bleeding.     O:   NST:   Start time: 8:35  Stop time 8:55  Baseline: 135  Accels: Present, 3 15x15 accels noted  Decels: Absent  Variability: Moderate  Reactive NST    [place]: 1 ctx    BSUS: Cephalic, spine maternal left    A/P: 28 year old  @37w4d here for ECV  Spontaneous conversion to cephalic  Decreased movement with reactive NST, kick counts reviewed with   Follow up as scheduled for PNC at [facility]    Augusta Swanson MD

## 2019-01-17 NOTE — PLAN OF CARE
Data: [facility] at 0805.   Reason for maternal/fetal assessment per patient is External Version  . Patient is a . Prenatal record reviewed.      Obstetric History       T0      L0     SAB0   TAB0   Ectopic0   Multiple0   Live Births0       # Outcome Date GA Lbr Arie/2nd Weight Sex Delivery Anes PTL Lv   1 Current                  Medical History:   Past Medical History:   Diagnosis Date     Heartburn during pregnancy    . Gestational Age 37w4d. VSS. Upon ultrasound, baby found in vertex position. Patient reports decreased fetal movement.    Action: Verbal consent for EFM. Triage assessment completed. EFM applied for decreased fetal movement. Fetal assessment: Presumed adequate fetal oxygenation documented (see flow record). Verbally reviewed. Patient instructed to report change in fetal movement, vaginal leaking of fluid or bleeding, abdominal pain, or any concerns related to the pregnancy to her nurse/physician.   Response: Dr. Swanson informed of patient arrival. Plan per provider is discharge home. Patient verbalized understanding of education and verbalized agreement with plan. Discharged ambulatory at 0910.

## 2019-01-27 ENCOUNTER — HOSPITAL ENCOUNTER (INPATIENT)
Facility: CLINIC | Age: 29
LOS: 3 days | Discharge: HOME HEALTH | End: 2019-01-31
Attending: ADVANCED PRACTICE MIDWIFE | Admitting: ADVANCED PRACTICE MIDWIFE
Payer: COMMERCIAL

## 2019-01-27 PROBLEM — Z34.00 SUPERVISION OF NORMAL FIRST PREGNANCY, ANTEPARTUM: Status: ACTIVE | Noted: 2018-07-05

## 2019-01-27 LAB
ERYTHROCYTE [DISTWIDTH] IN BLOOD BY AUTOMATED COUNT: 14.6 % (ref 10–15)
HCT VFR BLD AUTO: 36.4 % (ref 35–47)
HGB BLD-MCNC: 12.3 G/DL (ref 11.7–15.7)
MCH RBC QN AUTO: 31.9 PG (ref 26.5–33)
MCHC RBC AUTO-ENTMCNC: 33.8 G/DL (ref 31.5–36.5)
MCV RBC AUTO: 94 FL (ref 78–100)
PLATELET # BLD AUTO: 159 10E9/L (ref 150–450)
RBC # BLD AUTO: 3.86 10E12/L (ref 3.8–5.2)
RUPTURE OF FETAL MEMBRANES BY ROM PLUS: NEGATIVE
WBC # BLD AUTO: 8.3 10E9/L (ref 4–11)

## 2019-01-27 PROCEDURE — 84460 ALANINE AMINO (ALT) (SGPT): CPT | Performed by: STUDENT IN AN ORGANIZED HEALTH CARE EDUCATION/TRAINING PROGRAM

## 2019-01-27 PROCEDURE — 86780 TREPONEMA PALLIDUM: CPT | Performed by: ADVANCED PRACTICE MIDWIFE

## 2019-01-27 PROCEDURE — 84156 ASSAY OF PROTEIN URINE: CPT | Performed by: STUDENT IN AN ORGANIZED HEALTH CARE EDUCATION/TRAINING PROGRAM

## 2019-01-27 PROCEDURE — 84450 TRANSFERASE (AST) (SGOT): CPT | Performed by: STUDENT IN AN ORGANIZED HEALTH CARE EDUCATION/TRAINING PROGRAM

## 2019-01-27 PROCEDURE — 36415 COLL VENOUS BLD VENIPUNCTURE: CPT | Performed by: STUDENT IN AN ORGANIZED HEALTH CARE EDUCATION/TRAINING PROGRAM

## 2019-01-27 PROCEDURE — 86850 RBC ANTIBODY SCREEN: CPT | Performed by: ADVANCED PRACTICE MIDWIFE

## 2019-01-27 PROCEDURE — 86901 BLOOD TYPING SEROLOGIC RH(D): CPT | Performed by: ADVANCED PRACTICE MIDWIFE

## 2019-01-27 PROCEDURE — 85027 COMPLETE CBC AUTOMATED: CPT | Performed by: STUDENT IN AN ORGANIZED HEALTH CARE EDUCATION/TRAINING PROGRAM

## 2019-01-27 PROCEDURE — 84112 EVAL AMNIOTIC FLUID PROTEIN: CPT | Performed by: ADVANCED PRACTICE MIDWIFE

## 2019-01-27 PROCEDURE — 86900 BLOOD TYPING SEROLOGIC ABO: CPT | Performed by: ADVANCED PRACTICE MIDWIFE

## 2019-01-27 PROCEDURE — 82565 ASSAY OF CREATININE: CPT | Performed by: STUDENT IN AN ORGANIZED HEALTH CARE EDUCATION/TRAINING PROGRAM

## 2019-01-27 RX ORDER — CHOLECALCIFEROL (VITAMIN D3) 50 MCG
1 TABLET ORAL DAILY
COMMUNITY

## 2019-01-27 ASSESSMENT — MIFFLIN-ST. JEOR: SCORE: 1219.42

## 2019-01-28 PROBLEM — O14.93 PREECLAMPSIA, THIRD TRIMESTER: Status: ACTIVE | Noted: 2019-01-28

## 2019-01-28 LAB
ABO + RH BLD: NORMAL
ABO + RH BLD: NORMAL
ALT SERPL W P-5'-P-CCNC: 34 U/L (ref 0–50)
ALT SERPL W P-5'-P-CCNC: 37 U/L (ref 0–50)
AST SERPL W P-5'-P-CCNC: 37 U/L (ref 0–45)
AST SERPL W P-5'-P-CCNC: 43 U/L (ref 0–45)
BLD GP AB SCN SERPL QL: NORMAL
BLOOD BANK CMNT PATIENT-IMP: NORMAL
CREAT SERPL-MCNC: 0.54 MG/DL (ref 0.52–1.04)
CREAT SERPL-MCNC: 0.54 MG/DL (ref 0.52–1.04)
CREAT UR-MCNC: 25 MG/DL
CREAT UR-MCNC: 90 MG/DL
ERYTHROCYTE [DISTWIDTH] IN BLOOD BY AUTOMATED COUNT: 14.9 % (ref 10–15)
GFR SERPL CREATININE-BSD FRML MDRD: >90 ML/MIN/{1.73_M2}
GFR SERPL CREATININE-BSD FRML MDRD: >90 ML/MIN/{1.73_M2}
HCT VFR BLD AUTO: 36.3 % (ref 35–47)
HGB BLD-MCNC: 12.4 G/DL (ref 11.7–15.7)
MCH RBC QN AUTO: 32.4 PG (ref 26.5–33)
MCHC RBC AUTO-ENTMCNC: 34.2 G/DL (ref 31.5–36.5)
MCV RBC AUTO: 95 FL (ref 78–100)
PLATELET # BLD AUTO: 157 10E9/L (ref 150–450)
PROT UR-MCNC: 0.32 G/L
PROT UR-MCNC: 0.78 G/L
PROT/CREAT 24H UR: 0.86 G/G CR (ref 0–0.2)
PROT/CREAT 24H UR: 1.24 G/G CR (ref 0–0.2)
RBC # BLD AUTO: 3.83 10E12/L (ref 3.8–5.2)
SPECIMEN EXP DATE BLD: NORMAL
T PALLIDUM AB SER QL: NONREACTIVE
WBC # BLD AUTO: 8.9 10E9/L (ref 4–11)

## 2019-01-28 PROCEDURE — 82565 ASSAY OF CREATININE: CPT | Performed by: ADVANCED PRACTICE MIDWIFE

## 2019-01-28 PROCEDURE — G0463 HOSPITAL OUTPT CLINIC VISIT: HCPCS

## 2019-01-28 PROCEDURE — 12000001 ZZH R&B MED SURG/OB UMMC

## 2019-01-28 PROCEDURE — 84156 ASSAY OF PROTEIN URINE: CPT | Performed by: ADVANCED PRACTICE MIDWIFE

## 2019-01-28 PROCEDURE — 84460 ALANINE AMINO (ALT) (SGPT): CPT | Performed by: ADVANCED PRACTICE MIDWIFE

## 2019-01-28 PROCEDURE — 84450 TRANSFERASE (AST) (SGOT): CPT | Performed by: ADVANCED PRACTICE MIDWIFE

## 2019-01-28 PROCEDURE — 25000132 ZZH RX MED GY IP 250 OP 250 PS 637: Performed by: ADVANCED PRACTICE MIDWIFE

## 2019-01-28 PROCEDURE — 36415 COLL VENOUS BLD VENIPUNCTURE: CPT | Performed by: ADVANCED PRACTICE MIDWIFE

## 2019-01-28 PROCEDURE — 85027 COMPLETE CBC AUTOMATED: CPT | Performed by: ADVANCED PRACTICE MIDWIFE

## 2019-01-28 RX ORDER — IBUPROFEN 800 MG/1
800 TABLET, FILM COATED ORAL
Status: DISCONTINUED | OUTPATIENT
Start: 2019-01-28 | End: 2019-01-30 | Stop reason: ALTCHOICE

## 2019-01-28 RX ORDER — LORAZEPAM 2 MG/ML
2 INJECTION INTRAMUSCULAR
Status: DISCONTINUED | OUTPATIENT
Start: 2019-01-28 | End: 2019-02-01 | Stop reason: HOSPADM

## 2019-01-28 RX ORDER — MAGNESIUM SULFATE HEPTAHYDRATE 40 MG/ML
2 INJECTION, SOLUTION INTRAVENOUS
Status: DISCONTINUED | OUTPATIENT
Start: 2019-01-28 | End: 2019-02-01 | Stop reason: HOSPADM

## 2019-01-28 RX ORDER — SODIUM CHLORIDE, SODIUM LACTATE, POTASSIUM CHLORIDE, CALCIUM CHLORIDE 600; 310; 30; 20 MG/100ML; MG/100ML; MG/100ML; MG/100ML
INJECTION, SOLUTION INTRAVENOUS CONTINUOUS
Status: DISCONTINUED | OUTPATIENT
Start: 2019-01-28 | End: 2019-01-29

## 2019-01-28 RX ORDER — METHYLERGONOVINE MALEATE 0.2 MG/ML
200 INJECTION INTRAVENOUS
Status: DISCONTINUED | OUTPATIENT
Start: 2019-01-28 | End: 2019-01-30

## 2019-01-28 RX ORDER — ACETAMINOPHEN 325 MG/1
650 TABLET ORAL EVERY 4 HOURS PRN
Status: DISCONTINUED | OUTPATIENT
Start: 2019-01-28 | End: 2019-01-30

## 2019-01-28 RX ORDER — OXYCODONE AND ACETAMINOPHEN 5; 325 MG/1; MG/1
1 TABLET ORAL
Status: DISCONTINUED | OUTPATIENT
Start: 2019-01-28 | End: 2019-01-30

## 2019-01-28 RX ORDER — ONDANSETRON 2 MG/ML
4 INJECTION INTRAMUSCULAR; INTRAVENOUS EVERY 6 HOURS PRN
Status: DISCONTINUED | OUTPATIENT
Start: 2019-01-28 | End: 2019-01-31

## 2019-01-28 RX ORDER — MAGNESIUM SULFATE HEPTAHYDRATE 40 MG/ML
4 INJECTION, SOLUTION INTRAVENOUS
Status: DISCONTINUED | OUTPATIENT
Start: 2019-01-28 | End: 2019-02-01 | Stop reason: HOSPADM

## 2019-01-28 RX ORDER — NALOXONE HYDROCHLORIDE 0.4 MG/ML
.1-.4 INJECTION, SOLUTION INTRAMUSCULAR; INTRAVENOUS; SUBCUTANEOUS
Status: DISCONTINUED | OUTPATIENT
Start: 2019-01-28 | End: 2019-01-30

## 2019-01-28 RX ORDER — OXYTOCIN 10 [USP'U]/ML
10 INJECTION, SOLUTION INTRAMUSCULAR; INTRAVENOUS
Status: DISCONTINUED | OUTPATIENT
Start: 2019-01-28 | End: 2019-01-31

## 2019-01-28 RX ORDER — OXYTOCIN/0.9 % SODIUM CHLORIDE 30/500 ML
100-340 PLASTIC BAG, INJECTION (ML) INTRAVENOUS CONTINUOUS PRN
Status: COMPLETED | OUTPATIENT
Start: 2019-01-28 | End: 2019-01-29

## 2019-01-28 RX ORDER — MAGNESIUM SULFATE HEPTAHYDRATE 500 MG/ML
4 INJECTION, SOLUTION INTRAMUSCULAR; INTRAVENOUS
Status: DISCONTINUED | OUTPATIENT
Start: 2019-01-28 | End: 2019-02-01 | Stop reason: HOSPADM

## 2019-01-28 RX ORDER — MISOPROSTOL 100 UG/1
25 TABLET ORAL
Status: DISCONTINUED | OUTPATIENT
Start: 2019-01-28 | End: 2019-01-29

## 2019-01-28 RX ORDER — HYDROXYZINE HYDROCHLORIDE 50 MG/1
50-100 TABLET, FILM COATED ORAL
Status: DISCONTINUED | OUTPATIENT
Start: 2019-01-28 | End: 2019-01-31

## 2019-01-28 RX ORDER — CARBOPROST TROMETHAMINE 250 UG/ML
250 INJECTION, SOLUTION INTRAMUSCULAR
Status: DISCONTINUED | OUTPATIENT
Start: 2019-01-28 | End: 2019-01-31

## 2019-01-28 RX ORDER — FENTANYL CITRATE 50 UG/ML
50-100 INJECTION, SOLUTION INTRAMUSCULAR; INTRAVENOUS
Status: DISCONTINUED | OUTPATIENT
Start: 2019-01-28 | End: 2019-01-31

## 2019-01-28 RX ADMIN — HYDROXYZINE HYDROCHLORIDE 100 MG: 50 TABLET ORAL at 23:37

## 2019-01-28 RX ADMIN — Medication 25 MCG: at 21:30

## 2019-01-28 RX ADMIN — Medication 25 MCG: at 23:37

## 2019-01-28 RX ADMIN — ACETAMINOPHEN 650 MG: 325 TABLET, FILM COATED ORAL at 10:19

## 2019-01-28 RX ADMIN — Medication 25 MCG: at 10:08

## 2019-01-28 RX ADMIN — Medication 25 MCG: at 19:02

## 2019-01-28 RX ADMIN — Medication 25 MCG: at 13:16

## 2019-01-28 NOTE — PROVIDER NOTIFICATION
01/27/19 2238   Provider Notification   Provider Name/Title Susu Page, CNM   Method of Notification In Department   Request Evaluate in Person   Notification Reason Patient Arrived     Notified provider about pt arrival. Plan for RN to check cervix.

## 2019-01-28 NOTE — PROVIDER NOTIFICATION
01/28/19 0220   Provider Notification   Provider Name/Title Susu Page, CNM   Method of Notification At Bedside     SVE unchanged from earlier exam. Pt is thuy too frequently on monitor for misoprostol. Plan to allow pt to rest for a couple of hours and re-evaluate. If contractions space, will give PO miso or will re-check cervix.

## 2019-01-28 NOTE — PROGRESS NOTES
Blood pressure 128/85, pulse 69, temperature 98.7  F (37.1  C), temperature source Oral, resp. rate 18, height 1.524 m (5'), weight 55.8 kg (123 lb), last menstrual period 04/21/2018.  General appearance: uncomfortable with contractions  Some stronger than others, pt states they have gotten stronger since moving into labor room from triage room.  in room. Pt asking appropriate questions about PreE and labor.    CONTRACTIONS: Contractions every 2-3 minutes.  Palpate: moderate  Pitocin- none,  Antibiotics- none  FETAL HEART TONES: baseline 130 with moderate FHR variability and  pos accelerations.  No decelerations present.      ROM: not ruptured  PELVIC EXAM:PELVIC EXAM: 1.5/ 50%/ Mid/ average/ -1   # Pain Assessment:  Current Pain Score 1/28/2019   Patient currently in pain? yes   Pain score (0-10) -   Pain location -   Pain descriptors Cramp[place] connor pain level was assessed and she currently denies pain.        ASSESSMENT:  ==============  IUP @ 39w1d early labor   Fetal Heart rate tracing  category one  GBS- negative  PreE  Patient Active Problem List   Diagnosis     Nausea and vomiting in pregnancy prior to 22 weeks gestation     Supervision of normal first pregnancy, antepartum - [facility] pt. Call [phone] if questions     Encounter for triage in pregnant patient     Labor and delivery, indication for care     Preeclampsia, third trimester          PLAN:  ===========  comfort measures prn   reevaluate in 2-4 hours/PRN  Discussed inability to use cervical ripening agents with current conx pattern. Encouraged that intensity has increased since arrival. Will observe for continued spon labor and increased intensity. if no sig change in next 2-4 hours will augment with pitocin IV     Susu Carroll,LISAM, APRN

## 2019-01-28 NOTE — PROGRESS NOTES
"Labor progress note    S:  Resting comfortably.  at bedside. A little overwhelmed with all of the experiences of labor-- \"I don't know what to do, what to think, what to feel.\" Contractions still crampy, uncomfortable.    O:  Blood pressure 119/62, pulse 69, temperature 98.1  F (36.7  C), temperature source Oral, resp. rate 20, height 1.397 m (4' 7\"), weight 55.8 kg (123 lb), last menstrual period 2018.  General appearance: comfortable, not breathing through contractions.  Contractions: Every 2-7 minutes.  seconds duration.  Palpate: moderate.  FHT: Baseline 140 with moderate variability. Accelerations present. No decelerations present.  ROM: not ruptured.  Pelvic exam: Deferred    Pitocin- none,  Antibiotics- none    A:  IUP @ 39w1d, Preeclampsia without severe features  Early labor   Fetal Heart rate tracing Category category one  GBS- negative  Patient Active Problem List   Diagnosis     Nausea and vomiting in pregnancy prior to 22 weeks gestation     Supervision of normal first pregnancy, antepartum - [facility] pt. Call [phone] if questions     Encounter for triage in pregnant patient     Labor and delivery, indication for care     Preeclampsia, third trimester       P:  Continue to monitor blood pressures & preeclampsia symptoms  Comfort measures prn  Continue cervical ripening with oral misoprostol, currently thuy too often for next dose  Wait 30-60 minutes to see if contractions space, if no change consider fluid bolus  Anticipate   MD consultant on call: French / available prn  Reevaluate in 2-4 hours/PRN    ANTONELLA Rea    I, Ariela Tobar, am serving as a scribe; to document services personally performed by ERNESTINA Peng CNM- based on data collection and the provider's statements to me.    Provider Disclosure:  I agree with above History, Review of Systems, Physical exam and Plan. I have reviewed the content of the documentation and have edited it as " needed. I have personally performed the services documented here and the documentation accurately represents those services and the decisions I have made.    ERNESTINA Peng, LISAM

## 2019-01-28 NOTE — PLAN OF CARE
"Continuing induction of labor for pre-e. Repeat pre-e labs WDL, protein creat ratio 0.86. Pt reports increased cramping, coping with labor well. Encouraged position change, hydrotherapy. Voiding.    /81   Pulse 69   Temp 99.3  F (37.4  C) (Oral)   Resp 18   Ht 1.397 m (4' 7\")   Wt 55.8 kg (123 lb)   LMP 04/21/2018   BMI 28.59 kg/m      "

## 2019-01-28 NOTE — PROGRESS NOTES
"Labor progress note    S:  Informed by RN that Marylin was feeling a \"burning pain\" in R upper quadrant of abdomen.  at bedside along with Liang and family members. By the time CNM was at bedside, pain had subsided. Marylin reports, \"it comes and goes\" and that it feels superficial, not deep. Some discomfort with abdominal palpation.    O:  Blood pressure 142/81, pulse 69, temperature 98.8  F (37.1  C), temperature source Oral, resp. rate 18, height 1.397 m (4' 7\"), weight 55.8 kg (123 lb), last menstrual period 2018.  General appearance: comfortable.  Contractions: Every 3-5 minutes.  seconds duration.  FHT: Baseline 140 with moderate variability. Accelerations present present. No decelerations present.  ROM: not ruptured.   Pelvic exam: deferred    Pitocin- none,  Antibiotics- none    Abdomen: soft, some tenderness to palpation in RUQ    A:  IUP @ 39w1d, preeclampsia w/o severe features  Blood pressure elevated again, not in severe range  Early labor   S/P two doses of oral misoprostol  Fetal Heart rate tracing Category category one  GBS- negative  Patient Active Problem List   Diagnosis     Nausea and vomiting in pregnancy prior to 22 weeks gestation     Supervision of normal first pregnancy, antepartum - [facility] pt. Call [phone] if questions     Encounter for triage in pregnant patient     Labor and delivery, indication for care     Preeclampsia, third trimester     P:  Repeat preeclampsia labs  Continue to monitor blood pressure  Comfort measures prn  Continue cervical ripening with misoprostol as appropriate  MD consultant on call: French / available prn  Reevaluate in 2-4 hours/PRN   Anticipate     ANTONELLA Rea    I, Ariela Tobar, am serving as a scribe; to document services personally performed by ERNESTINA Peng CNM- based on data collection and the provider's statements to me.    Provider Disclosure:  I agree with above History, Review of Systems, Physical " exam and Plan. I have reviewed the content of the documentation and have edited it as needed. I have personally performed the services documented here and the documentation accurately represents those services and the decisions I have made.    ERNESTINA Peng, ARVIND    Addendum: pre-e labs are stable

## 2019-01-28 NOTE — PROGRESS NOTES
"Data: Patient presented to [facility] at approx 2200.   Reason for maternal/fetal assessment per patient is Rule Out Labor  .  Patient is a . Prenatal record reviewed.      Obstetric History       T0      L0     SAB0   TAB0   Ectopic0   Multiple0   Live Births0       # Outcome Date GA Lbr Arie/2nd Weight Sex Delivery Anes PTL Lv   1 Current               . Medical history:   Past Medical History:   Diagnosis Date     Heartburn during pregnancy    . Gestational Age 39w1d. VSS. Fetal movement present. Patient denies backache, pelvic pressure, UTI symptoms, GI problems, bloody show, vaginal bleeding, edema, headache, visual disturbances, epigastric or URQ pain, abdominal pain. C/o irregular contractions and increased vaginal discharge, pt states \"unsure if water is broken\". Support person present.  Action: Verbal consent for EFM. Triage assessment completed. EFM applied for fetal well-being. Uterine assessment done via TOCO. Fetal assessment: Presumed adequate fetal oxygenation documented (see flow record). ROM Plus collected, resulted negative. Noted mild range BPs (140s), collected pre-eclampsia labwork. SVE 1.5/50/-2.   Response: Susu Carroll CNM informed of pt arrival, SVE, and elevated BPs. Plan per provider is collect labwork and await results. Pt stated decrease in strength of contractions since arrival. Awaiting labwork for plan.  "

## 2019-01-28 NOTE — PLAN OF CARE
BPs normotensive to mild range. Denies HA, vision changes or epigastric pain. Sabina too frequently for misoprostol. Plan for oncoming provider to repeat SVE and determine IOL route. Will continue to monitor.

## 2019-01-28 NOTE — PROGRESS NOTES
"Labor and Delivery Progress Note    Marylin Mejía MRN# [id number]   Age: 28 year old YOB: 1990           Subjective:   Marylin is resting comfortably with Liang at the bedside. Worried about pain with SVE.           Objective:     Patient Vitals for the past 8 hrs:   BP Temp Temp src Pulse Resp Height   19 0845 118/68 -- -- -- -- --   19 0753 131/78 98.4  F (36.9  C) Oral 69 20 --   19 0605 115/62 98.5  F (36.9  C) Oral -- 18 --   19 0426 -- -- -- -- -- 1.397 m (4' 7\")   19 0208 128/85 -- -- -- 18 --   19 0154 (!) 142/92 -- -- -- 18 --      Cervical Exam:  1.5 / 50 / -2   Consistency: medium     Position: Mid    Chaudhari score: 5    Membranes: Intact     Fetal Heart Rate:   Monitor: External / US     Variability: moderate     Baseline Rate: 135              Accels: Present              Decels: Absent     Fetal Heart Rate Tracing: Cat I  Contractions: Every 2-6 minutes lasting  seconds, palpate mild        Assessment:   Marylin Mejía is a 28 year old  who is 39w1d here in early labor with no cervical change and preeclampsia without severe features. FHT Category I. Normotensive since 0, no other preeclampsia symptoms at this time.        Plan:   Discussed risks and benefits of cervical ripening with oral misoprostol, Marylin is agreeable to plan to begin with oral miso.   Comfort measures PRN  Anticipate   MD consultant on call: French / available prn  Marylin and Liang have had all of their questions answered and are in agreement with this plan of care.    Ariela Tobar, ANTONELLA    I, Ariela Tobar, am serving as a scribe; to document services personally performed by ERNESTINA Peng CNM- based on data collection and the provider's statements to me.    Provider Disclosure:  I agree with above History, Review of Systems, Physical exam and Plan. I have reviewed the content of the documentation and have edited it as needed. I have " personally performed the services documented here and the documentation accurately represents those services and the decisions I have made.    Corie Parisi CNM on 1/28/2019 at 9:44 AM

## 2019-01-28 NOTE — PLAN OF CARE
Pt states is comfortable, denies pain, leaking or bleeding. Baby reactive, ctx's 2-6 mild; pt not feeling ctx's, able to sleep. Denies vision changes, normal reflexes. Tylenol 600 mg oral given at 1019 for headache; pt states it improved. First oral misol given at 1008.  in room. Report given to Scott JAEGER

## 2019-01-28 NOTE — PROGRESS NOTES
"Labor progress note    S:  Still resting in bed. Has not been interested in getting up on the birth ball or walking. Contractions are about the same.    O:  Blood pressure 107/68, pulse 69, temperature 98.4  F (36.9  C), temperature source Oral, resp. rate 18, height 1.397 m (4' 7\"), weight 55.8 kg (123 lb), last menstrual period 2018.  General appearance: comfortable.  Contractions: Indeterminate. RN adjusting toco.  FHT: Baseline 145 with moderate variability. Accelerations present. No decelerations present.  ROM: intact  Pelvic exam: Deferred    Pitocin- none,  Antibiotics- none    A:  IUP @ 39w1d, preeclampsia w/o severe features   Fetal Heart rate tracing category one  GBS- negative  Patient Active Problem List   Diagnosis     Nausea and vomiting in pregnancy prior to 22 weeks gestation     Supervision of normal first pregnancy, antepartum - [facility] pt. Call [phone] if questions     Encounter for triage in pregnant patient     Labor and delivery, indication for care     Preeclampsia, third trimester     P:  Encouraged Marylin to get out of bed & get upright. Able to get her on the birth ball with coaching and support. Left walking with Liang.  comfort measures prn  Continue cervical ripening with misoprostol when contraction pattern reestablished  Anticipate   MD consultant on call: French / available prn  reevaluate in 2-4 hours/PRN     ANTONELLA Rea    I, Ariela Tobar, am serving as a scribe; to document services personally performed by ERNESTINA Peng CNM- based on data collection and the provider's statements to me.    Provider Disclosure:  I agree with above History, Review of Systems, Physical exam and Plan. I have reviewed the content of the documentation and have edited it as needed. I have personally performed the services documented here and the documentation accurately represents those services and the decisions I have made.    ERNESTIAN Peng CNM      "

## 2019-01-28 NOTE — H&P
"ADMIT NOTE  =================  39w1d    Marylin Mejía is a 28 year old female with an Patient's last menstrual period was 2018. and Estimated Date of Delivery: Feb 3, 2019 [facility] at 1:43 AM with in early labor and elevated BP, labs abn and patient now meets criteria for preE.     HPI  ================    Contractions- mild and moderate  Fetal movement- active  ROM- no   Vaginal bleeding- none  GBS- negative  FOB- is involved, Elvis, at bedside  Other labor support- none    Weight gain- 123 - 93 lbs, Total weight gain- 30 lbs  Height- 58\"  BMI- 21  First prenatal visit at 7 weeks, Total visits- 13    PROBLEM LIST  =================  Patient Active Problem List    Diagnosis Date Noted     Supervision of normal first pregnancy, antepartum - [facility] pt. Call [phone] if questions 2018     Priority: High     Seen on 10/30/18 for light belly bump and decreased fetal movement. Reactive NST, movement noted by pt in triage.  O+, antibody -  Rubella immune  Hep negative          Preeclampsia, third trimester 2019     Priority: Medium     Encounter for triage in pregnant patient 10/30/2018     Priority: Medium     Labor and delivery, indication for care 10/30/2018     Priority: Medium     Nausea and vomiting in pregnancy prior to 22 weeks gestation 2018     Priority: Medium       HISTORIES  ============  No Known Allergies  Past Medical History:   Diagnosis Date     Heartburn during pregnancy      History reviewed. No pertinent surgical history..  History reviewed. No pertinent family history.  Social History     Tobacco Use     Smoking status: Never Smoker     Smokeless tobacco: Never Used   Substance Use Topics     Alcohol use: No     Obstetric History       T0      L0     SAB0   TAB0   Ectopic0   Multiple0   Live Births0       # Outcome Date GA Lbr Arie/2nd Weight Sex Delivery Anes PTL Lv   1 Current                    LABS: "   ===========  Prenatal Labs:  Rhogam not indicated   Lab Results   Component Value Date    HGB 12.3 01/27/2019     Rubella nonimmune  Lab Results   Component Value Date    GBS negative 01/07/2019     Other labs:  Results for orders placed or performed during the hospital encounter of 01/27/19 (from the past 24 hour(s))   Rupture of Fetal Membranes by ROM Plus   Result Value Ref Range    Rupture of Fetal Membranes by ROM Plus Negative NEG^Negative   Protein  random urine with Creat Ratio   Result Value Ref Range    Protein Random Urine 0.32 g/L    Protein Total Urine g/gr Creatinine 1.24 (H) 0 - 0.2 g/g Cr   Creatinine urine calculation only   Result Value Ref Range    Creatinine Urine 25 mg/dL   AST   Result Value Ref Range    AST 43 0 - 45 U/L   ALT   Result Value Ref Range    ALT 37 0 - 50 U/L   Creatinine   Result Value Ref Range    Creatinine 0.54 0.52 - 1.04 mg/dL    GFR Estimate >90 >60 mL/min/[1.73_m2]    GFR Estimate If Black >90 >60 mL/min/[1.73_m2]   CBC with platelets   Result Value Ref Range    WBC 8.3 4.0 - 11.0 10e9/L    RBC Count 3.86 3.8 - 5.2 10e12/L    Hemoglobin 12.3 11.7 - 15.7 g/dL    Hematocrit 36.4 35.0 - 47.0 %    MCV 94 78 - 100 fl    MCH 31.9 26.5 - 33.0 pg    MCHC 33.8 31.5 - 36.5 g/dL    RDW 14.6 10.0 - 15.0 %    Platelet Count 159 150 - 450 10e9/L       ROS  =========  Pt denies significant respiratory, cardiovacular, GI, or muscular/skeletalcomplaints.    See RN data base ROS.       PHYSICAL EXAM:  ===============  /79   Pulse 69   Temp 98.7  F (37.1  C) (Oral)   Resp 18   Ht 1.524 m (5')   Wt 55.8 kg (123 lb)   LMP 04/21/2018   BMI 24.02 kg/m    General appearance: uncomfortable with contractions  GENERAL APPEARANCE: healthy, alert and no distress  RESP: lungs clear to auscultation - no rales, rhonchi or wheezes  BREAST: normal without masses, tenderness or nipple discharge and no palpable axillary masses or adenopathy  CV: regular rates and rhythm, normal S1 S2, no S3 or  S4 and no murmur,and no varicosities  ABDOMEN:  soft, nontender, no epigastric pain  SKIN: no suspicious lesions or rashes  NEURO: Denies headache, blurred vision, other vision changes  PSYCH: mentation appears normal. and affect normal/bright  Legs: Reflexes normal bilaterally     Abdomen: gravid, vertex fetus per Leopold's, non-tender between contractions.   Cephalic presentation confirmed by BSUS  EFW-  6.5 lbs.   CONTACTIONS: every 1-5 minutes  FETAL HEART TONES: continuous EFM- baseline 130 with moderate variability and positive accelerations. No decelerations.  PELVIC EXAM: 1.5/ 50%/ Mid/ soft/ -1   THURMAN SCORE: 6  BLOODY SHOW: no   ROM:no  FLUID: none  AMNISURE: not done    ASSESSMENT:  ==============  IUP @ 39w1d admitted in early labor and new dx of PreE     Fetal Heart Rate - category one  GBS- negative  PreE without severe features  Patient Active Problem List   Diagnosis     Nausea and vomiting in pregnancy prior to 22 weeks gestation     Supervision of normal first pregnancy, antepartum - [facility] pt. Call [phone] if questions     Encounter for triage in pregnant patient     Labor and delivery, indication for care     Preeclampsia, third trimester        PLAN:  ===========  Admit - see IP orders  Ambulation, hydration, position changes, birthing ball and tub options to facilitate labor reviewed with pt .  Observation and reevaluate in 1-2 hours prn  ERNESTINA Cantrell CNM

## 2019-01-28 NOTE — PROVIDER NOTIFICATION
01/27/19 4273   Provider Notification   Provider Name/Title Susu Page, CNM   Method of Notification In Department   Request Evaluate - Remote   Notification Reason Maternal Vital Sign Change     Notified provider of mild range BPs (140s). Plan to order pre-e labs.

## 2019-01-28 NOTE — PROGRESS NOTES
Data: Labwork results back, outside normal limits.  Response: Susu Carroll CNM informed of pt labwork. Plan per provider is admit pt for IOL r/t new diagnosis of pre-eclampsia. Patient verbalized agreement with plan. Patient transferred to room 458 ambulatory, oriented to room and call light.

## 2019-01-28 NOTE — PROVIDER NOTIFICATION
01/28/19 1320   Provider Notification   Provider Name/Title Ariela Contrerasdorene Student LISAM, Isak SAMUELS   Method of Notification In Department   Request Evaluate - Remote   Notification Reason Status Update;Maternal Vital Sign Change;Uterine Activity   Pt reporting mild burning sensation to superficial RUQ. No tenderness noted on palpation. Denies vision changes, HA, n/v. /81. New orders for repeat pre-e labs. Second PO miso given.

## 2019-01-29 ENCOUNTER — ANESTHESIA EVENT (OUTPATIENT)
Dept: OBGYN | Facility: CLINIC | Age: 29
End: 2019-01-29
Payer: COMMERCIAL

## 2019-01-29 ENCOUNTER — ANESTHESIA (OUTPATIENT)
Dept: OBGYN | Facility: CLINIC | Age: 29
End: 2019-01-29
Payer: COMMERCIAL

## 2019-01-29 ENCOUNTER — OFFICE VISIT (OUTPATIENT)
Dept: INTERPRETER SERVICES | Facility: CLINIC | Age: 29
End: 2019-01-29

## 2019-01-29 ENCOUNTER — OFFICE VISIT (OUTPATIENT)
Dept: INTERPRETER SERVICES | Facility: CLINIC | Age: 29
End: 2019-01-29
Payer: COMMERCIAL

## 2019-01-29 PROCEDURE — 25000125 ZZHC RX 250: Performed by: STUDENT IN AN ORGANIZED HEALTH CARE EDUCATION/TRAINING PROGRAM

## 2019-01-29 PROCEDURE — 25000125 ZZHC RX 250: Performed by: ADVANCED PRACTICE MIDWIFE

## 2019-01-29 PROCEDURE — 25000128 H RX IP 250 OP 636: Performed by: STUDENT IN AN ORGANIZED HEALTH CARE EDUCATION/TRAINING PROGRAM

## 2019-01-29 PROCEDURE — 72200001 ZZH LABOR CARE VAGINAL DELIVERY SINGLE

## 2019-01-29 PROCEDURE — 3E033VJ INTRODUCTION OF OTHER HORMONE INTO PERIPHERAL VEIN, PERCUTANEOUS APPROACH: ICD-10-PCS | Performed by: ADVANCED PRACTICE MIDWIFE

## 2019-01-29 PROCEDURE — 40000977 ZZH STATISTIC ATTENDANCE AT DELIVERY

## 2019-01-29 PROCEDURE — 25000128 H RX IP 250 OP 636: Performed by: ADVANCED PRACTICE MIDWIFE

## 2019-01-29 PROCEDURE — T1013 SIGN LANG/ORAL INTERPRETER: HCPCS | Mod: U3

## 2019-01-29 PROCEDURE — 25000132 ZZH RX MED GY IP 250 OP 250 PS 637: Performed by: ADVANCED PRACTICE MIDWIFE

## 2019-01-29 PROCEDURE — 3E0R3BZ INTRODUCTION OF ANESTHETIC AGENT INTO SPINAL CANAL, PERCUTANEOUS APPROACH: ICD-10-PCS | Performed by: ANESTHESIOLOGY

## 2019-01-29 PROCEDURE — 88307 TISSUE EXAM BY PATHOLOGIST: CPT | Mod: 26 | Performed by: ADVANCED PRACTICE MIDWIFE

## 2019-01-29 PROCEDURE — 25000128 H RX IP 250 OP 636

## 2019-01-29 PROCEDURE — 88307 TISSUE EXAM BY PATHOLOGIST: CPT | Performed by: ADVANCED PRACTICE MIDWIFE

## 2019-01-29 PROCEDURE — 12000001 ZZH R&B MED SURG/OB UMMC

## 2019-01-29 PROCEDURE — 00HU33Z INSERTION OF INFUSION DEVICE INTO SPINAL CANAL, PERCUTANEOUS APPROACH: ICD-10-PCS | Performed by: ANESTHESIOLOGY

## 2019-01-29 RX ORDER — LIDOCAINE HYDROCHLORIDE 10 MG/ML
INJECTION, SOLUTION EPIDURAL; INFILTRATION; INTRACAUDAL; PERINEURAL
Status: DISCONTINUED
Start: 2019-01-29 | End: 2019-01-30 | Stop reason: HOSPADM

## 2019-01-29 RX ORDER — LIDOCAINE HYDROCHLORIDE AND EPINEPHRINE 15; 5 MG/ML; UG/ML
INJECTION, SOLUTION EPIDURAL PRN
Status: DISCONTINUED | OUTPATIENT
Start: 2019-01-29 | End: 2019-02-09 | Stop reason: HOSPADM

## 2019-01-29 RX ORDER — FENTANYL/BUPIVACAINE/NS/PF 2-1250MCG
PLASTIC BAG, INJECTION (ML) INJECTION
Status: COMPLETED
Start: 2019-01-29 | End: 2019-01-29

## 2019-01-29 RX ORDER — EPHEDRINE SULFATE 50 MG/ML
5 INJECTION, SOLUTION INTRAMUSCULAR; INTRAVENOUS; SUBCUTANEOUS
Status: DISCONTINUED | OUTPATIENT
Start: 2019-01-29 | End: 2019-02-01 | Stop reason: HOSPADM

## 2019-01-29 RX ORDER — GENTAMICIN SULFATE 80 MG/100ML
1.5 INJECTION, SOLUTION INTRAVENOUS EVERY 8 HOURS
Status: DISCONTINUED | OUTPATIENT
Start: 2019-01-30 | End: 2019-01-31

## 2019-01-29 RX ORDER — SODIUM CHLORIDE, SODIUM LACTATE, POTASSIUM CHLORIDE, CALCIUM CHLORIDE 600; 310; 30; 20 MG/100ML; MG/100ML; MG/100ML; MG/100ML
INJECTION, SOLUTION INTRAVENOUS CONTINUOUS
Status: DISCONTINUED | OUTPATIENT
Start: 2019-01-29 | End: 2019-01-31

## 2019-01-29 RX ORDER — LIDOCAINE 40 MG/G
CREAM TOPICAL
Status: DISCONTINUED | OUTPATIENT
Start: 2019-01-29 | End: 2019-01-31

## 2019-01-29 RX ORDER — NALBUPHINE HYDROCHLORIDE 10 MG/ML
2.5-5 INJECTION, SOLUTION INTRAMUSCULAR; INTRAVENOUS; SUBCUTANEOUS EVERY 6 HOURS PRN
Status: DISCONTINUED | OUTPATIENT
Start: 2019-01-29 | End: 2019-02-01 | Stop reason: HOSPADM

## 2019-01-29 RX ORDER — NALOXONE HYDROCHLORIDE 0.4 MG/ML
.1-.4 INJECTION, SOLUTION INTRAMUSCULAR; INTRAVENOUS; SUBCUTANEOUS
Status: DISCONTINUED | OUTPATIENT
Start: 2019-01-29 | End: 2019-01-30

## 2019-01-29 RX ORDER — BUPIVACAINE HYDROCHLORIDE AND EPINEPHRINE 2.5; 5 MG/ML; UG/ML
INJECTION, SOLUTION INFILTRATION; PERINEURAL PRN
Status: DISCONTINUED | OUTPATIENT
Start: 2019-01-29 | End: 2019-02-09 | Stop reason: HOSPADM

## 2019-01-29 RX ORDER — AMPICILLIN 2 G/1
2 INJECTION, POWDER, FOR SOLUTION INTRAVENOUS EVERY 6 HOURS
Status: DISCONTINUED | OUTPATIENT
Start: 2019-01-29 | End: 2019-01-31

## 2019-01-29 RX ORDER — EPHEDRINE SULFATE 50 MG/ML
INJECTION, SOLUTION INTRAMUSCULAR; INTRAVENOUS; SUBCUTANEOUS
Status: DISCONTINUED
Start: 2019-01-29 | End: 2019-01-29 | Stop reason: WASHOUT

## 2019-01-29 RX ORDER — MISOPROSTOL 200 UG/1
TABLET ORAL
Status: DISCONTINUED
Start: 2019-01-29 | End: 2019-01-30 | Stop reason: HOSPADM

## 2019-01-29 RX ORDER — OXYTOCIN/0.9 % SODIUM CHLORIDE 30/500 ML
1-24 PLASTIC BAG, INJECTION (ML) INTRAVENOUS CONTINUOUS
Status: DISCONTINUED | OUTPATIENT
Start: 2019-01-29 | End: 2019-01-31

## 2019-01-29 RX ORDER — OXYTOCIN 10 [USP'U]/ML
INJECTION, SOLUTION INTRAMUSCULAR; INTRAVENOUS
Status: DISCONTINUED
Start: 2019-01-29 | End: 2019-01-30 | Stop reason: WASHOUT

## 2019-01-29 RX ADMIN — Medication 25 MCG: at 01:46

## 2019-01-29 RX ADMIN — BUPIVACAINE HYDROCHLORIDE AND EPINEPHRINE BITARTRATE 3 ML: 2.5; .005 INJECTION, SOLUTION INFILTRATION; PERINEURAL at 20:17

## 2019-01-29 RX ADMIN — OXYTOCIN-SODIUM CHLORIDE 0.9% IV SOLN 30 UNIT/500ML 1 MILLI-UNITS/MIN: 30-0.9/5 SOLUTION at 17:49

## 2019-01-29 RX ADMIN — FENTANYL CITRATE 100 MCG: 50 INJECTION, SOLUTION INTRAMUSCULAR; INTRAVENOUS at 11:50

## 2019-01-29 RX ADMIN — ACETAMINOPHEN 650 MG: 325 TABLET, FILM COATED ORAL at 21:58

## 2019-01-29 RX ADMIN — SODIUM CHLORIDE, POTASSIUM CHLORIDE, SODIUM LACTATE AND CALCIUM CHLORIDE: 600; 310; 30; 20 INJECTION, SOLUTION INTRAVENOUS at 12:47

## 2019-01-29 RX ADMIN — SODIUM CHLORIDE, POTASSIUM CHLORIDE, SODIUM LACTATE AND CALCIUM CHLORIDE: 600; 310; 30; 20 INJECTION, SOLUTION INTRAVENOUS at 04:55

## 2019-01-29 RX ADMIN — OXYTOCIN-SODIUM CHLORIDE 0.9% IV SOLN 30 UNIT/500ML 2 MILLI-UNITS/MIN: 30-0.9/5 SOLUTION at 04:56

## 2019-01-29 RX ADMIN — FENTANYL CITRATE 100 MCG: 50 INJECTION, SOLUTION INTRAMUSCULAR; INTRAVENOUS at 09:36

## 2019-01-29 RX ADMIN — GENTAMICIN SULFATE 120 MG: 40 INJECTION, SOLUTION INTRAMUSCULAR; INTRAVENOUS at 21:36

## 2019-01-29 RX ADMIN — AMPICILLIN SODIUM 2 G: 2 INJECTION, POWDER, FOR SOLUTION INTRAVENOUS at 19:21

## 2019-01-29 RX ADMIN — Medication: at 12:47

## 2019-01-29 RX ADMIN — FENTANYL CITRATE 100 MCG: 50 INJECTION, SOLUTION INTRAMUSCULAR; INTRAVENOUS at 07:33

## 2019-01-29 RX ADMIN — OXYTOCIN-SODIUM CHLORIDE 0.9% IV SOLN 30 UNIT/500ML 340 ML/HR: 30-0.9/5 SOLUTION at 22:10

## 2019-01-29 RX ADMIN — SODIUM CHLORIDE, POTASSIUM CHLORIDE, SODIUM LACTATE AND CALCIUM CHLORIDE 500 ML: 600; 310; 30; 20 INJECTION, SOLUTION INTRAVENOUS at 12:30

## 2019-01-29 RX ADMIN — ACETAMINOPHEN 650 MG: 325 TABLET, FILM COATED ORAL at 15:47

## 2019-01-29 RX ADMIN — SODIUM CHLORIDE, POTASSIUM CHLORIDE, SODIUM LACTATE AND CALCIUM CHLORIDE 250 ML: 600; 310; 30; 20 INJECTION, SOLUTION INTRAVENOUS at 16:45

## 2019-01-29 RX ADMIN — Medication 6 ML: at 12:41

## 2019-01-29 RX ADMIN — LIDOCAINE HYDROCHLORIDE,EPINEPHRINE BITARTRATE 3 ML: 15; .005 INJECTION, SOLUTION EPIDURAL; INFILTRATION; INTRACAUDAL; PERINEURAL at 12:36

## 2019-01-29 RX ADMIN — LIDOCAINE HYDROCHLORIDE 20 ML: 10 INJECTION, SOLUTION EPIDURAL; INFILTRATION; INTRACAUDAL; PERINEURAL at 22:23

## 2019-01-29 NOTE — PLAN OF CARE
Patient VSS, BP's WNL.  Patient denies headache, epigastric pain, and visual disturbances.  Patient currently in pain with uterine contractions and declines pain medication and is coping with labor algorithm.  Pitocin at 3 mu.  DTR's 2+ no clonus present.  Plan of care reviewed with patient with  present, understanding verbalized.   Continue current plan of care.

## 2019-01-29 NOTE — PROGRESS NOTES
"Labor progress note    S: Pt noted sitting up on birthing ball, eating dinner. FOB and support at bedside.  Communicating effectively with in person .  Pt feeling some cramping but nothing too painful. Questions about next SVE.  No HA, visual changes, epigastric pain, or swelling.  RUQ pain has subsided.,     O:  Blood pressure 107/68, pulse 69, temperature 98.4  F (36.9  C), temperature source Oral, resp. rate 18, height 1.397 m (4' 7\"), weight 55.8 kg (123 lb), last menstrual period 2018.  General appearance: comfortable.  Contractions: Every 2-7 minutes. 50-70 seconds duration.  Palpate: mild and not all felt by patient.  Soft resting tone.   FHT: Baseline 140 with moderate variability. Accelerations are present. no decelerations present. Periods of loss of contact when pt up ambulating, EFM readjusted by RN team.   ROM: not ruptured. ours.  PELVIC EXAM:deferred  Chaudhari Score:7/13 on admit     Pitocin- none,  Antibiotics- none  IV saline lock  PO Miso #3 at 1902        # Pain Assessment:    - Marylin is experiencing pain due to uterine cramping/cervical ripening. Pain management was discussed with Marylin and her family and significant other and the plan was created in a collaborative fashion.  Marylin's response to the current recommendations: engaged  - Coping well with non pharmacologic options at present        A:  28 year old  with IUP @ 39w1d IOL for Pre E without severe features    Fetal Heart Rate Tracing category one  GBS- negative    Patient Active Problem List   Diagnosis     Nausea and vomiting in pregnancy prior to 22 weeks gestation     Supervision of normal first pregnancy, antepartum - [facility] pt. Call [phone] if questions     Encounter for triage in pregnant patient     Labor and delivery, indication for care     Preeclampsia, third trimester         P:  Ambulation, hydration, position changes, birthing ball/sling and tub options to facilitate labor.      Continue " cervical ripening with Misoprostol oral at this time per protocol. Pt desires SVE before next dose ~2100.   Reevaluate in ~2 hours and prn  Annemarie DO, LISAM

## 2019-01-29 NOTE — PROGRESS NOTES
"Labor progress note    S: Pt having some contractions but nothing regular or uncomfortable.  Up to bathroom to void.  Consents to E for plan of care discussion. FOB remains at bedside, supportive.  Communicating effectively with in person .     O:  Blood pressure 107/68, pulse 69, temperature 98.1  F (36.7  C), temperature source Oral, resp. rate 18, height 1.397 m (4' 7\"), weight 55.8 kg (123 lb), last menstrual period 2018.  General appearance: comfortable.  Contractions: Every 4-8 minutes. 50-70 seconds duration.  Palpate: mild and not all felt by patient.  Soft resting tone.   FHT: Baseline 140 with moderate variability. Accelerations are present. no decelerations present.  ROM: not ruptured.   PELVIC EXAM:1.5/ 70%/ Mid/ average/ -2. Membranes swept with pt consent.   Chaudhari Score:  -------------------------------  Dilation: 1: 1-2cm.   Effacement: 2: 51-80%.  Station:1: -2.   Consistency: 1: Intermediate  Position of Cervix: 1: Intermediate.  Total:     Pitocin- none,  Antibiotics- none  IV saline lock  Spontaneous void for adequate yellow urine         # Pain Assessment:    - Marylin is experiencing pain due to cramping. Pain management was discussed with Marylin and her significant other and the plan was created in a collaborative fashion.  Marylin's response to the current recommendations: engaged  - Declines pharmacologic options at present.  Using heat packs with good relief.         A:  28 year old  with IUP @ 39w1d IOL for Pre E without severe features     Fetal Heart Rate Tracing category one  GBS- negative    Patient Active Problem List   Diagnosis     Nausea and vomiting in pregnancy prior to 22 weeks gestation     Supervision of normal first pregnancy, antepartum - [facility] pt. Call [phone] if questions     Encounter for triage in pregnant patient     Labor and delivery, indication for care     Preeclampsia, third trimester         P:  Ambulation, hydration, position " changes, birthing ball/sling and tub options to facilitate labor.  Pain medication options reviewed with pt. Pt is interested in non pharmacologic options at present.  Reviewed Vistaril prn for rest, pt will consider.   Continue cervical ripening with Misoprostol oral per protocol per pt preference. Reviewed Vaginal dosing prn, pt will consider.   One severe range BP not sustained. Asymptomatic.  Consult MD prn if worsening hypertensive disorder.  Consider C/Section prn if remote from delivery and worsening hypertensive disorder.   Reevaluate 2-4 hours and prn    Annemarie DO, LISAM

## 2019-01-29 NOTE — PROGRESS NOTES
"Labor progress note    S: No issues per RN staff.  Continued mild range BPs.       O:  Blood pressure 122/63, pulse 69, temperature 97.4  F (36.3  C), temperature source Oral, resp. rate 18, height 1.397 m (4' 7\"), weight 55.8 kg (123 lb), last menstrual period 2018.  General appearance: comfortable.  Contractions: Every 4-8 minutes. 50-70 seconds duration.  Palpate: mild.  Soft resting tone.   FHT: Baseline 130 with moderate variability, periods of minimal. Accelerations are present. no decelerations present.  ROM: not ruptured.   PELVIC EXAM:deferred  Chaudhari Score:6/13 on last SVE    Pitocin- none,  Antibiotics- none  PO Miso #6 at 0146  Vistaril PO at 2337      # Pain Assessment:    - Marylin is experiencing pain due to uterine cramping. Pain management was discussed with Marylin and her significant other and the plan was created in a collaborative fashion.  Marylin's response to the current recommendations: engaged  - Resting s/p Vistaril dosing        A:  28 year old  with IUP @ 39w2d IOL for Pre E without severe features   Fetal Heart Rate Tracing category two  GBS- negative    Patient Active Problem List   Diagnosis     Nausea and vomiting in pregnancy prior to 22 weeks gestation     Supervision of normal first pregnancy, antepartum - [facility] pt. Call [phone] if questions     Encounter for triage in pregnant patient     Labor and delivery, indication for care     Preeclampsia, third trimester         P:  Ambulation, hydration, position changes, birthing ball/sling and tub options to facilitate labor.  At this time recommend continued rest if able.  Continue cervical ripening with Misoprostol oral per protocol per pt request  Continue intrauterine resuscitation efforts for category 2 FHR tracing.  Consult MD and consider operative delivery prn if persistent or worsening category 2 FHR tracing despite interventions remote from delivery.   Continue close maternal/fetal surevillance for s/s of worsening " hypertensive disorder, consult MD prn.   Reevaluate in 2-4 hours and prn    LISA PastorM

## 2019-01-29 NOTE — PROGRESS NOTES
"Labor progress note    S:  Patient breathing well with contractions.  Resting on left lateral side with peanut ball.  Requesting pain medication.     O:  Blood pressure 129/82, pulse 69, temperature 98.7  F (37.1  C), temperature source Oral, resp. rate 18, height 1.397 m (4' 7\"), weight 55.8 kg (123 lb), last menstrual period 2018.  General appearance: uncomfortable with contractions.  Contractions: Every 1-3 minutes. 40-60 seconds duration.  Palpate: moderate.  FHT: Baseline 135 with minimal variability after IV Fentanyl. Accelerations are present. Early decelerations present.  ROM: not ruptured.   Pelvic exam: deferred  Pitocin- 3 mu/min.,  Antibiotics- none      A:  IUP @ 39w2d IOL for pre-eclampsia without severe features and early labor induction  Fetal Heart rate tracing Category category one  GBS- negative  Patient Active Problem List   Diagnosis     Nausea and vomiting in pregnancy prior to 22 weeks gestation     Supervision of normal first pregnancy, antepartum - [facility] pt. Call [phone] if questions     Encounter for triage in pregnant patient     Labor and delivery, indication for care     Preeclampsia, third trimester         P:  Pain medication Fentanyl given x2 doses, patient considering labor epidural  Continue Pitocin induction  Anticipate      ERNESTINA Leiva CNM  "

## 2019-01-29 NOTE — PLAN OF CARE
"IOL continued, pitocin infusing. Epidural started at 1247, pt experiencing adequate pain relief. BPs stable. FHR tracings Category 1-2 through most of shift. Continue to monitor closely.     /81   Pulse 69   Temp 98.7  F (37.1  C) (Oral)   Resp 18   Ht 1.397 m (4' 7\")   Wt 55.8 kg (123 lb)   LMP 04/21/2018   SpO2 97%   BMI 28.59 kg/m      "

## 2019-01-29 NOTE — PROVIDER NOTIFICATION
01/29/19 0429   Provider Notification   Provider Name/Title ARVIND Ma   Method of Notification At Bedside   Request Evaluate in Person   Notification Reason SVE

## 2019-01-29 NOTE — PROVIDER NOTIFICATION
01/29/19 0431   Provider Notification   Provider Name/Title ARVIND Ma    Method of Notification At Bedside   Request Evaluate in Person   Notification Reason Variability Change  (discussed variability with CNM, start pitocin per CNM. )

## 2019-01-29 NOTE — PLAN OF CARE
Pt in pain; using fentanyl IV but requested and got an epidural.  at the bedside. Consents signed and witnesses. VSS through out procedure. Pt reports she is now comfortable. Expect . Continue to monitor comfort.

## 2019-01-29 NOTE — PLAN OF CARE
Pt stable, VS WDL - with exception of 1 severe range BP followed by a BP within parameters. Pt up on birthing ball, walking halls, changing position frequently. Pt encouraged to rest, option of vistaril given for sleep. Oral miso given for cervical ripening per parameters. Pt undecided about pain management with labor progression. FHR appropriate for gestational age with an occasional late decel.

## 2019-01-29 NOTE — PROVIDER NOTIFICATION
01/29/19 0320   Provider Notification   Provider Name/Title ARVIND Ma   Method of Notification Phone   Request Evaluate - Remote   Notification Reason Status Update

## 2019-01-29 NOTE — PROVIDER NOTIFICATION
New orders to give 250 ml fluid bolus and recheck temp in one hour. Prerna is aware patient received tylenol for headache.

## 2019-01-29 NOTE — ANESTHESIA PREPROCEDURE EVALUATION
Anesthesia Pre-Procedure Evaluation    Patient: Marylin Mejía   MRN:     [id number] Gender:   female   Age:    28 year old :      1990        Preoperative Diagnosis: * No surgery found *        Past Medical History:   Diagnosis Date     Heartburn during pregnancy       History reviewed. No pertinent surgical history.       Anesthesia Evaluation       history and physical reviewed .             ROS/MED HX    ENT/Pulmonary:  - neg pulmonary ROS     Neurologic:  - neg neurologic ROS     Cardiovascular:  - neg cardiovascular ROS       METS/Exercise Tolerance:  >4 METS   Hematologic:         Musculoskeletal:         GI/Hepatic:     (+) GERD       Renal/Genitourinary:         Endo:         Psychiatric:         Infectious Disease:         Malignancy:         Other:                         PHYSICAL EXAM:   Mental Status/Neuro: A/A/O   Airway: Facies: Feasible   Respiratory:   Resp. Effort: Normal      CV:   Rate: Age appropriate   Comments:                    Lab Results   Component Value Date    WBC 8.9 2019    HGB 12.4 2019    HCT 36.3 2019     2019     2018    POTASSIUM 3.1 (L) 2018    CHLORIDE 101 2018    CO2 23 2018    BUN 7 2018    CR 0.54 2019    GLC 74 2018    BETSY 9.6 2018    ALBUMIN 3.7 2018    PROTTOTAL 8.0 2018    ALT 34 2019    AST 37 2019    ALKPHOS 50 2018    BILITOTAL 0.4 2018    LIPASE 124 2018    HCG Positive 2018       Preop Vitals  BP Readings from Last 3 Encounters:   19 123/74   18 115/69   10/30/18 95/54    Pulse Readings from Last 3 Encounters:   19 69   18 82   18 72      Resp Readings from Last 3 Encounters:   19 18   18 18   10/30/18 20    SpO2 Readings from Last 3 Encounters:   18 98%   18 100%   18 100%      Temp Readings from Last 1 Encounters:   19 37.1  C (98.7  F) (Oral)    Ht  "Readings from Last 1 Encounters:   01/28/19 1.397 m (4' 7\")      Wt Readings from Last 1 Encounters:   01/27/19 55.8 kg (123 lb)    Estimated body mass index is 28.59 kg/m  as calculated from the following:    Height as of this encounter: 1.397 m (4' 7\").    Weight as of this encounter: 55.8 kg (123 lb).     LDA:  Peripheral IV 01/28/19 Right Lower forearm (Active)   Site Assessment WDL 1/29/2019  7:33 AM   Line Status Infusing 1/29/2019  7:33 AM   Phlebitis Scale 0-->no symptoms 1/29/2019  7:33 AM   Infiltration Scale 0 1/29/2019  7:33 AM   Number of days: 1            Assessment:   ASA SCORE: 2 emergent     NPO Status: Increased aspiration risk   Documentation: Deferred     Tobacco Use:  NO Active use of Tobacco/UNKNOWN Tobacco use status     Plan:   Anes. Type:  Regional     RA Details:  Labor/OB Procedure; Catheter     RA-Location/Type: Epidural (L)   Pre-Induction: None   Induction:  Not applicable   Airway: Native Airway   Access/Monitoring: PIV   Maintenance: LA-Catheter   Emergence: N/a   Logistics: Remote Procedure; Observation/Admission     Postop Pain/Sedation Strategy:  Advanced Options: LA-Catheter     PONV Management: NO PONV Prevention Needed  Adult Risk Factors: Female, Non-Smoker     CONSENT: Direct conversation;  in Room   Plan and risks discussed with: Patient                  (+) pre-eclampsia    Without severe features             Jacinto Garcia MD  "

## 2019-01-29 NOTE — PROGRESS NOTES
"Labor progress note    S:  Patient remains comfortable with labor epidural.  Reports feeling warm.      O:  Blood pressure 126/72, pulse 69, temperature 99.9  F (37.7  C), temperature source Oral, resp. rate 16, height 1.397 m (4' 7\"), weight 55.8 kg (123 lb), last menstrual period 04/21/2018, SpO2 98 %. Temperature max was 100.7 on one occasion.  General appearance: comfortable.  Contractions: Every 1-2 minutes. 60 seconds duration.  Palpate: strong. Uterine tachystystole noted so Pitocin was discontinued at 1710.  FHT: Baseline 150 with minimal variability. Accelerations are not present. No decelerations present.  ROM: not ruptured.   Pelvic exam: 4.5/ 100%/ Mid/ average/ 0  Pitocin- none,  Antibiotics- none      A:  IUP @ 39w2d early labor and minimal/no progress   Fetal Heart rate tracing Category category two  GBS- negative  Patient Active Problem List   Diagnosis     Nausea and vomiting in pregnancy prior to 22 weeks gestation     Supervision of normal first pregnancy, antepartum - [facility] pt. Call [phone] if questions     Encounter for triage in pregnant patient     Labor and delivery, indication for care     Preeclampsia, third trimester         P:  Discussed that only minimal cervical change noted with regular contractions.  Discussed recommendation for amniotomy and placement of IUPC.     After discussion and patient and husbands questions answered, verbal consent obtained.    Amniotomy performed, large amount of bloody show noted with exam.  Scant amount of fluid noted.  IUPC placed without difficulty, light meconium fluid noted in IUPC return.    Pitocin to be restarted now that contractions have spaced.    Continue frequent position changes.    Continue to monitor maternal temperature.  Discussed Triple I with patient and potential need for antibiotics if meets criteria.  ERNESTINA Leiva [facility]  "

## 2019-01-29 NOTE — PROVIDER NOTIFICATION
01/28/19 2100   Provider Notification   Provider Name/Title EMMA Ma CNM   Method of Notification At Bedside   Request Evaluate in Person   Notification Reason SVE   EMMA Ma CNM at bedside for SVE [1.5/70/-2], will plan to continue with miso - pt given option for oral or vaginal dose. Pt comfortable, talking through contractions.   Severe range BP x1 (188/80, recheck 131/79) - EMMA Ma CNM aware

## 2019-01-29 NOTE — PROGRESS NOTES
"Labor progress note    S: Pt is awake and breathing through contractions but support from FOB.  Consents to SVE for plan of care discussion.     O:  Blood pressure 122/63, pulse 69, temperature 97.4  F (36.3  C), temperature source Oral, resp. rate 18, height 1.397 m (4' 7\"), weight 55.8 kg (123 lb), last menstrual period 2018.  General appearance: uncomfortable with contractions.  Contractions: Every 3-5 minutes. 50-80 seconds duration.  Palpate: mild and moderate.  Soft resting tone.   FHT: Baseline 130 with moderate variability with periods of minimal. Accelerations are present. no decelerations present.  ROM: not ruptured.   PELVIC EXAM:2/ 80%/ Mid/ soft/ -2 . +bloody show on glove after SVE  Chaudhari Score:  -------------------------------  Dilation: 1: 1-2cm.   Effacement: 3 8-100.   Station: 1: -2.   Consistency:2: Soft.  Position of Cervix:1: Intermediate..  Total:     Pitocin- none,  Antibiotics- none  IV saline lock          # Pain Assessment:    - Marylin is experiencing pain due to uterine cramping, early labor. Pain management was discussed with Marylin and her significant other and the plan was created in a collaborative fashion.  Marylin's response to the current recommendations: engaged  - Pt got some rest on/off after Vistaril but is getting more uncomfortable, grimacing and breathing through contractions. Using heating pad for low back pain.         A:  28 year old  with IUP @ 39w2d IOL for Pre E without severe features  Chaudhari score now 8     Fetal Heart Rate Tracing category two - +Accels but periods of minimal variability  GBS- negative    Patient Active Problem List   Diagnosis     Nausea and vomiting in pregnancy prior to 22 weeks gestation     Supervision of normal first pregnancy, antepartum - [facility] pt. Call [phone] if questions     Encounter for triage in pregnant patient     Labor and delivery, indication for care     Preeclampsia, third trimester         P:  Ambulation, " hydration, position changes, birthing ball/sling and tub options to facilitate labor.  Pain medication options reviewed with pt. Pt is interested in non pharmacologic options at this time. Will consider IV Fentanyl prn.   Labor induction with Pitocin reviewed with pt. Agreeable to plan.   Reevaluate in 2-4 hours and prn    Annemarie DO, CNM

## 2019-01-29 NOTE — ANESTHESIA PROCEDURE NOTES
Epidural Procedure Note    Staff:     Anesthesiologist:  Augustine Herbert MD    Resident/CRNA:  Jacinto Garcia MD    Procedure performed by resident/CRNA in the presence of a teaching physician    Location: OB     Procedure start time:  1/29/2019 12:15 PM     Procedure end time:  1/29/2019 12:41 PM   Pre-procedure checklist:   patient identified, IV checked, site marked, risks and benefits discussed, informed consent, monitors and equipment checked, pre-op evaluation, at physician/surgeon's request and post-op pain management      Correct Patient: Yes      Correct Position: Yes      Correct Site: Yes      Correct Procedure: Yes      Correct Laterality:  Yes    Site Marked:  Yes  Procedure:     Procedure:  Epidural catheter    ASA:  2 and Emergent    Diagnosis:  Labor    Position:  Sitting    Sterile Prep: chloraprep, mask, sterile gloves and patient draped      Insertion site:  L3-4    Local skin infiltration:  1% lidocaine    amount (mL):  3    Approach:  Midline    Needle gauge (G):  17    Needle Length (in):  3.5    Block Needle Type:  Touhy    Injection Technique:  LORT saline    WILFRID at (cm):  4.5    Attempts:  1    Redirects:  0    Catheter gauge (G):  19    Catheter threaded easily: Yes      Threaded to cm at skin:  10    Threaded in epidural space (cm):  5.5    Paresthesias:  No    Aspiration negative for Heme or CSF: Yes       Local anesthetic:  Lidocaine 1.5% w/ 1:200,000 epinephrine    Test dose time:  12:36    Test dose negative for signs of intravascular, subdural or intrathecal injection: Yes

## 2019-01-30 ENCOUNTER — OFFICE VISIT (OUTPATIENT)
Dept: INTERPRETER SERVICES | Facility: CLINIC | Age: 29
End: 2019-01-30
Payer: COMMERCIAL

## 2019-01-30 LAB
ERYTHROCYTE [DISTWIDTH] IN BLOOD BY AUTOMATED COUNT: 15.2 % (ref 10–15)
HCT VFR BLD AUTO: 23.5 % (ref 35–47)
HGB BLD-MCNC: 8 G/DL (ref 11.7–15.7)
MCH RBC QN AUTO: 32.4 PG (ref 26.5–33)
MCHC RBC AUTO-ENTMCNC: 34 G/DL (ref 31.5–36.5)
MCV RBC AUTO: 95 FL (ref 78–100)
PLATELET # BLD AUTO: 117 10E9/L (ref 150–450)
RBC # BLD AUTO: 2.47 10E12/L (ref 3.8–5.2)
WBC # BLD AUTO: 20.3 10E9/L (ref 4–11)

## 2019-01-30 PROCEDURE — T1013 SIGN LANG/ORAL INTERPRETER: HCPCS | Mod: U3

## 2019-01-30 PROCEDURE — 12000001 ZZH R&B MED SURG/OB UMMC

## 2019-01-30 PROCEDURE — 0KQM0ZZ REPAIR PERINEUM MUSCLE, OPEN APPROACH: ICD-10-PCS | Performed by: ADVANCED PRACTICE MIDWIFE

## 2019-01-30 PROCEDURE — 25000132 ZZH RX MED GY IP 250 OP 250 PS 637: Performed by: ADVANCED PRACTICE MIDWIFE

## 2019-01-30 PROCEDURE — 25000128 H RX IP 250 OP 636: Performed by: ADVANCED PRACTICE MIDWIFE

## 2019-01-30 PROCEDURE — 36415 COLL VENOUS BLD VENIPUNCTURE: CPT | Performed by: ADVANCED PRACTICE MIDWIFE

## 2019-01-30 PROCEDURE — 10907ZC DRAINAGE OF AMNIOTIC FLUID, THERAPEUTIC FROM PRODUCTS OF CONCEPTION, VIA NATURAL OR ARTIFICIAL OPENING: ICD-10-PCS | Performed by: ADVANCED PRACTICE MIDWIFE

## 2019-01-30 PROCEDURE — 85027 COMPLETE CBC AUTOMATED: CPT | Performed by: ADVANCED PRACTICE MIDWIFE

## 2019-01-30 RX ORDER — OXYTOCIN/0.9 % SODIUM CHLORIDE 30/500 ML
340 PLASTIC BAG, INJECTION (ML) INTRAVENOUS CONTINUOUS PRN
Status: DISCONTINUED | OUTPATIENT
Start: 2019-01-30 | End: 2019-02-01 | Stop reason: HOSPADM

## 2019-01-30 RX ORDER — HYDROCORTISONE 2.5 %
CREAM (GRAM) TOPICAL 3 TIMES DAILY PRN
Status: DISCONTINUED | OUTPATIENT
Start: 2019-01-30 | End: 2019-02-01 | Stop reason: HOSPADM

## 2019-01-30 RX ORDER — NALOXONE HYDROCHLORIDE 0.4 MG/ML
.1-.4 INJECTION, SOLUTION INTRAMUSCULAR; INTRAVENOUS; SUBCUTANEOUS
Status: DISCONTINUED | OUTPATIENT
Start: 2019-01-30 | End: 2019-02-01 | Stop reason: HOSPADM

## 2019-01-30 RX ORDER — AMOXICILLIN 250 MG
1 CAPSULE ORAL 2 TIMES DAILY
Status: DISCONTINUED | OUTPATIENT
Start: 2019-01-30 | End: 2019-02-01 | Stop reason: HOSPADM

## 2019-01-30 RX ORDER — ACETAMINOPHEN 325 MG/1
650 TABLET ORAL EVERY 4 HOURS PRN
Status: DISCONTINUED | OUTPATIENT
Start: 2019-01-30 | End: 2019-02-01 | Stop reason: HOSPADM

## 2019-01-30 RX ORDER — AMOXICILLIN 250 MG
2 CAPSULE ORAL 2 TIMES DAILY
Status: DISCONTINUED | OUTPATIENT
Start: 2019-01-30 | End: 2019-02-01 | Stop reason: HOSPADM

## 2019-01-30 RX ORDER — IBUPROFEN 800 MG/1
800 TABLET, FILM COATED ORAL EVERY 6 HOURS PRN
Status: DISCONTINUED | OUTPATIENT
Start: 2019-01-30 | End: 2019-02-01 | Stop reason: HOSPADM

## 2019-01-30 RX ORDER — OXYTOCIN/0.9 % SODIUM CHLORIDE 30/500 ML
100 PLASTIC BAG, INJECTION (ML) INTRAVENOUS CONTINUOUS
Status: DISCONTINUED | OUTPATIENT
Start: 2019-01-30 | End: 2019-02-01 | Stop reason: HOSPADM

## 2019-01-30 RX ORDER — BISACODYL 10 MG
10 SUPPOSITORY, RECTAL RECTAL DAILY PRN
Status: DISCONTINUED | OUTPATIENT
Start: 2019-02-01 | End: 2019-02-01 | Stop reason: HOSPADM

## 2019-01-30 RX ORDER — LANOLIN 100 %
OINTMENT (GRAM) TOPICAL
Status: DISCONTINUED | OUTPATIENT
Start: 2019-01-30 | End: 2019-02-01 | Stop reason: HOSPADM

## 2019-01-30 RX ORDER — OXYTOCIN 10 [USP'U]/ML
10 INJECTION, SOLUTION INTRAMUSCULAR; INTRAVENOUS
Status: DISCONTINUED | OUTPATIENT
Start: 2019-01-30 | End: 2019-02-01 | Stop reason: HOSPADM

## 2019-01-30 RX ORDER — OXYCODONE HYDROCHLORIDE 5 MG/1
5 TABLET ORAL EVERY 4 HOURS PRN
Status: DISCONTINUED | OUTPATIENT
Start: 2019-01-30 | End: 2019-01-31

## 2019-01-30 RX ADMIN — AMPICILLIN SODIUM 2 G: 2 INJECTION, POWDER, FOR SOLUTION INTRAVENOUS at 19:55

## 2019-01-30 RX ADMIN — IBUPROFEN 800 MG: 800 TABLET, FILM COATED ORAL at 12:52

## 2019-01-30 RX ADMIN — OXYCODONE HYDROCHLORIDE 5 MG: 5 TABLET ORAL at 09:41

## 2019-01-30 RX ADMIN — SENNOSIDES AND DOCUSATE SODIUM 2 TABLET: 8.6; 5 TABLET ORAL at 09:41

## 2019-01-30 RX ADMIN — AMPICILLIN SODIUM 2 G: 2 INJECTION, POWDER, FOR SOLUTION INTRAVENOUS at 01:55

## 2019-01-30 RX ADMIN — IBUPROFEN 800 MG: 800 TABLET, FILM COATED ORAL at 19:53

## 2019-01-30 RX ADMIN — AMPICILLIN SODIUM 2 G: 2 INJECTION, POWDER, FOR SOLUTION INTRAVENOUS at 14:16

## 2019-01-30 RX ADMIN — OXYCODONE HYDROCHLORIDE 5 MG: 5 TABLET ORAL at 14:06

## 2019-01-30 RX ADMIN — ACETAMINOPHEN 650 MG: 325 TABLET, FILM COATED ORAL at 22:41

## 2019-01-30 RX ADMIN — AMPICILLIN SODIUM 2 G: 2 INJECTION, POWDER, FOR SOLUTION INTRAVENOUS at 06:45

## 2019-01-30 RX ADMIN — GENTAMICIN SULFATE 80 MG: 80 INJECTION, SOLUTION INTRAVENOUS at 03:58

## 2019-01-30 RX ADMIN — SENNOSIDES AND DOCUSATE SODIUM 2 TABLET: 8.6; 5 TABLET ORAL at 19:53

## 2019-01-30 RX ADMIN — ACETAMINOPHEN 650 MG: 325 TABLET, FILM COATED ORAL at 18:31

## 2019-01-30 RX ADMIN — GENTAMICIN SULFATE 80 MG: 80 INJECTION, SOLUTION INTRAVENOUS at 11:55

## 2019-01-30 RX ADMIN — GENTAMICIN SULFATE 80 MG: 80 INJECTION, SOLUTION INTRAVENOUS at 20:35

## 2019-01-30 RX ADMIN — ACETAMINOPHEN 650 MG: 325 TABLET, FILM COATED ORAL at 09:41

## 2019-01-30 RX ADMIN — ACETAMINOPHEN 650 MG: 325 TABLET, FILM COATED ORAL at 14:06

## 2019-01-30 RX ADMIN — IBUPROFEN 800 MG: 800 TABLET, FILM COATED ORAL at 06:45

## 2019-01-30 NOTE — PROGRESS NOTES
Labor and Delivery Progress Note    Marylin Mejía MRN# [id number]   Age: 28 year old YOB: 1990         Subjective:   Marylin is having increased pressure with contractions.            Objective:     Cervical Exam:  10 /100   /0        Position: Mid    Membranes: Leaking meconium  Fetal Heart Rate:    Monitor:  External and iupc     Variability:   mid   Baseline Rate:   165   Fetal Heart Rate Tracing: cat II    Complete:  pushing at         Assessment:   Marylin Mejía is a 28 year old  who is 39w2d here with IOL for pre-e, chorioamnionitis. Labor epidural with good pain relief.         Plan:   Anticipate   Will begin directed pushing d/t chorio  NICU to come for delivery  MD consultant on call Judith/ macario Parisi CNM on 2019 at 9:32 PM

## 2019-01-30 NOTE — PROGRESS NOTES
Labor and Delivery Progress Note    Marylin Mejía MRN# [id number]   Age: 28 year old YOB: 1990         Subjective:   Marylin is having increased pain on her right side and diffusely over her abdomen. She is wanting help with pain management.           Objective:     Cervical Exam:   deferred  Membranes: Leaking   Fetal Heart Rate:    Monitor:   IUPC   Variability:   Min, repetitive VDs    Baseline Rate:   150-170   Fetal Heart Rate Tracing: cat II  IUPC: adequate MVUs          Assessment:   Marylin Mejía is a 28 year old  who is 39w2d here with IOL for pre-eclampsia. Poor pain relief.        Plan:   Called MDA to rebchris epidural  Reassess once comfortable  MD consultant on call Judith/ macario Parisi CNM on 2019 at 8:03 PM

## 2019-01-30 NOTE — PLAN OF CARE
Patient arrived to [facility] unit via wheelchair at 0900,with belongings, accompanied by spouse/ significant other, with infant in arms. Received report from Maritza JAEGER and checked bands. Unit and room orientation started. Call light given; no concerns present at this time. Continue with plan of care.

## 2019-01-30 NOTE — PLAN OF CARE
Patient claimed her bottom hurts but not much. She's been getting tylenol, ibuprofen and oxycodone which she claimed it helped. Was able to get up and ambulate to the bathroom slowly with assist of 1. Denies any light headedness during the activity. Breastfeeding with minimal assist and also showed her how to hand express, did  teach back. Will continue with plan of care.

## 2019-01-30 NOTE — PROVIDER NOTIFICATION
01/30/19 1247   Provider Notification   Provider Name/Title Katharina   Method of Notification Electronic Page   Request Evaluate-Remote   Notification Reason Lab Results  (Hgb 8)

## 2019-01-30 NOTE — L&D DELIVERY NOTE
OB Vaginal Delivery Note    Marylin Mejía MRN# [id number]   Age: 28 year old YOB: 1990     GA: 39w2d  GP:   Labor Complications: Dysfunctional Labor;Preeclampsia/Hypertension;Chorioamnionitis TMax of 103.0 oral  EBL:    mL  QBL: 1414 mL  Delivery Type: Vaginal, Spontaneous   ROM to Delivery Time: 3272-2201 4h31m    [place] Weight:      1 Minute 5 Minute 10 Minute   Apgar Totals: 8    9          Labor Details:  Admit on 19 for IOL d/t mild pre-eclampsia. Cytotec PO and pitocin for induction with pitocin max of 2mU. AROM @1730 for meconium stained fluid SVE was 4.5/100/0. Epidural with good pain relief. Dx of chorio @1630 and ampicillin and gentamycin given prior to delivery.  C/C/+1 at , strong maternal effort to push shortly thereafter. TMax of 103 reached prior to delivery. NICU called to room and present for delivery.    Delivery Details:  Marylin Mejía, a 28 year old  female delivered a viable infant with apgars of 8   and 9  . Patient was fully dilated and pushing after 4  hours 47  minutes in active labor. Delivery was via vaginal, spontaneous  to a sterile field under epidural  anesthesia. Infant delivered in vertex  left  occiput  anterior  position. Anterior and posterior shoulders delivered without difficulty. The cord was clamped x4 and a segment of cord was maintained for cord blood gasses, then cut twice and 3 vessels  were noted. Baby was passed to NICU for resuscitation as she was not immediately vigorous. Cord blood was obtained in routine fashion with the following disposition:lab   Cord complications: none   Placenta delivered at 2019 10:10 PM . Placental disposition was Pathology . Fundal massage performed and fundus found to be firm.   Episiotomy: none    Perineum, vagina, cervix were inspected, and the following lacerations were noted:   Perineal lacerations: to capsule but not through -  2nd            vaginal laceration noted     repaired  by Dr. Wong and Dr. Sanders. Vaginal packing was needed to reach hemostasis. See OB note    Any lacerations were repaired in the usual fashion using 3-0 and 4-0.    Excellent hemostasis was noted. Needle count correct. Infant and patient in delivery room in good and stable condition.      Labor Event Times    Labor onset date:  19 Onset time:   4:00 PM   Dilation complete date:  19 Complete time:   8:47 PM   Start pushing date/time:  2019      Labor Length    1st Stage (hrs):  4 (min):  47   2nd Stage (hrs):  1 (min):  14   3rd Stage (hrs):  0 (min):  9      Labor Events     labor?:  No   steroids:  None  Labor Type:  Induction  Predominate monitoring during 1st stage:  continuous electronic fetal monitoring     Antibiotics received during labor?:  Yes  Reason for Antibiotics:  Chorioamnionitis  Antibiotics given for Chorioamnionitis:  Ampicillin/Gentamicin     Rupture identifier:  Rupture 1  Rupture date/time: 19   Rupture type:  Artificial Rupture of Membranes  Fluid color:  Meconium     Induction:  Oxytocin  Induction date/time:     Cervical ripening date/time:     Indications for induction:  Mild Preeclampsia     1:1 continuous labor support provided by?:  none Labor partogram used?:  no      Delivery/Placenta Date and Time    Delivery Date:  19 Delivery Time:  10:01 PM   Placenta Date/Time:  2019 10:10 PM  Oxytocin given at the time of delivery:  after delivery of placenta     Vaginal Counts     Initial count performed by 2 team members:   Two Team Members   homero Matthews RN       Needles Suture [place] Sponges Instruments   Initial counts 2 5 5    Added to count 0 10 5    Final counts       Placed during labor Accounted for at the end of labor   NA NA   Yes Yes   NA NA        Packing intentionally left In:  Yes       Apgars    Living status:  Living   1 Minute 5 Minute 10 Minute 15 Minute 20 Minute   Skin color: 1  1       Heart rate: 2  2       Reflex  "irritability: 1  2       Muscle tone: 2  2       Respiratory effort: 2  2       Total: 8  9       Apgars assigned by:  SHARON DO CNP     Cord    Vessels:  3 Vessels Complications:  None       Resuscitation    Methods:  None   Care at Delivery:  NICU team requested on behalf of Dr QIAN Parisi for a term delivery with meconium stained amniotic fluid and maternal diagnosis of chorioamnionitis. Infant was placed upon her mother's chest after delivery. She was dried, stimulated and cried. She was brought to the radiant warmer, suctioned for thick secretions and dried.  Early Onset Sepsis Process completed.  Well appearing infant to NBN for antibiotic therapy.  [place] Assessment Tool Data:Gestational Age: 39 2/7th weeks  Maternal temperature range: T Max: 102.9  F (39.4  C)  Membranes ruptured for: 5  hours  GBS Lab Results:  negative         Antibiotics: Penicillin    Antibiotic Administration Time Frame: 5 hours before delivery  EOS Risk score: 2.03  Infant's adjusted Sepsis Risk Score: 0.83    Determination based on clinical exam after birth: Based on the examination this is a Well Appearing infant. To [facility] with Mom.    Sharon DO CNP 2019 10:32 PM  Output in Delivery Room:  Stool      Measurements     Length:  1' 7\"   Head circumference:  31.8 cm       Skin to Skin and Feeding Plan    Skin to skin initiation date/time: 1841    Skin to skin with:  Mother  Skin to skin end date/time: 1841       Labor Events and Shoulder Dystocia    Fetal Tracing Prior to Delivery:  Category 2  Shoulder dystocia present?:  Neg     Delivery (Maternal) (Provider to Complete) ([id number])    Episiotomy:  None  Perineal lacerations:  2nd Repaired?:  Yes   Vaginal laceration?:  Yes    Cervical laceration?:  No    Packing Intentionally Left In:  Yes     Blood Loss  Mother: Marylin Richardson #[id number]   Start of Mother's Information    IO Blood Loss  19 1600 - " 01/30/19 0215    Total QBL Blood Loss (mL) Hospital Encounter 1414 mL    Total  1414 mL         End of Mother's Information  Mother: Marylin Richardson #[id number]         Delivery - Provider to Complete ([id number])    CNM Care:  Exclusive CNM care in labor  Attempted Delivery Types (Choose all that apply):  Spontaneous Vaginal Delivery  Delivery Type (Choose the 1 that will go to the Birth History):  Vaginal, Spontaneous   Other personnel:   Provider Role   Corie Parisi CNM Midwife         Placenta    Immediate Cord Clamping:  Done  Date/Time:  1/29/2019 10:10 PM  Removal:  Spontaneous  Disposition:  Pathology     Anesthesia    Method:  Epidural  Cervical dilation at placement:  0-3          Presentation and Position    Presentation:  Vertex  Position:  Left Occiput Anterior         Corie Parisi CNM

## 2019-01-30 NOTE — PLAN OF CARE
of viable Female with Dr. Wong and Dr. Sanders in attendance. NICU  present. Infant with spontaneous cry to mothers abdomen, dried and stimulated. Apgars 8/9. Placenta delivered without complications, pitocin bolused, 3rd degree partial laceration, repairs done and vaginal packing to be left in place overnight. Indwelling huff to remain in place until vaginal packing removed. QBL 1414. Emelina cares provided. Mother and baby in stable condition.

## 2019-01-30 NOTE — PLAN OF CARE
Patient's vitals are stable. Breastfeeding baby on demand with minimal assist with positioning. Scant minimal and fundus firm at U/1. Transferring to [facility].

## 2019-01-30 NOTE — PROGRESS NOTES
"Post Partum Note    Marylin Mejía      MRN#: [id number]  Age: 28 year old      YOB: 1990      Post-partum day #1    SIGNIFICANT PROBLEMS:  Patient Active Problem List   Diagnosis     Nausea and vomiting in pregnancy prior to 22 weeks gestation     Supervision of normal first pregnancy, antepartum - [facility] pt. Call [phone] if questions     Encounter for triage in pregnant patient     Labor and delivery, indication for care     Preeclampsia, third trimester      (normal spontaneous vaginal delivery)        INTERVAL HISTORY:  BP 92/50   Pulse 69   Temp 98.4  F (36.9  C) (Oral)   Resp 18   Ht 1.397 m (4' 7\")   Wt 55.8 kg (123 lb)   LMP 2018   SpO2 99%   Breastfeeding? Unknown   BMI 28.59 kg/m     Pt stable, baby is rooming in. Baby girl. Has IV access d/t triple I.   Breast feeding status:initiated  Complications since 2 hours post delivery: non-hemostatic 2nd degree laceration requiring vaginal packing- now removed. Huff cath in place.Triple I. PPH 1414. BP WNL.  Patient is tolerating activity well, Voiding without difficulty, cramping is minimal and is relieved by Ibuprofen, lochia is decreasing and patient denies clots.  Perineal pain is is minimal and is relieved by Ibuprofen.  The perineum laceration is well approximated    Vaginal packing removed at 0700 by OB resident. Bleeding WNL. Perineum swollen so huff catheter left in place with recommendation for removal in evening. Pt transferred up to [facility].    Postpartum hemoglobin   Recent Labs   Lab 19  1337 19  2346   HGB 12.4 12.3      Blood type   Lab Results   Component Value Date    ABO O 2019    RH Pos 2019      Rubella status immune    History of depression: denies. Postpartum depression warning signs reviewed.    ASSESSMENT:    Breasts: soft, filling  Nipples:intact, without lesion  Fundus: firm @ umbilicus-2, midline, nontender  Abdomen: soft, +bs, nttp  Lochia: small rubra, no clots, "  No odor  Perineum: well approximated, healing well, Edema noted but improving. no erythema, bruising, hematoma or s/s of infection  Legs: trace edema, nontender    PLAN:  Normal postpartum exam , Stable Post-partum day #1  Complications: pre-e without severe features- normotensive since 1/29 @ 2034,  non-hemostatic 2nd degree laceration requiring vaginal packing- now removed. Huff cath in place.Triple I. PPH 1414    Home Visit Ordered- Yes    Postpartum warning s/s reviewed, including bleeding/clots, fever, mastitis, or depression, Kegels/ crunches.    CBC with plts ordered for 1200 today and for tomorrow AM.   Continue to monitor blood pressure.   Orders placed to remove huff catheter at approximately 1900.    Plan d/c home tomorrow.   RTC 6 weeks     ERNESTINA Arriaga, ARVIND

## 2019-01-30 NOTE — PLAN OF CARE
Data: Marylin Mejía transferred to [facility] via wheelchair at 0845. Baby transferred via parent's arms.  Action: Receiving unit notified of transfer: Yes. Patient and family notified of room change. Report given to Zuri JAEGER at 0900. Belongings sent to receiving unit. Accompanied by Registered Nurse. Oriented patient to surroundings. Call light within reach. ID bands double-checked with receiving RN.  Response: Patient tolerated transfer and is stable.

## 2019-01-30 NOTE — PLAN OF CARE
Patient meets criteria for triple I.  NICU updated that we will need them present for the delivery.  Ampicillin started Q 6 hours.

## 2019-01-30 NOTE — PROGRESS NOTES
Called into room post-delivery to assess perineal laceration. Upon inspection there was a deep second degree laceration noted that appeared to go down to, but not into the capsule of the external anal sphincter. A rectal exam at this time revealed an intact sphincter. The perineum was hugely edematous and appeared the the vaginal mucosa had avulsed from the deeper capsular tissue. Lidocaine for anesthesia was placed and allowed to sit for 1 minute. Then, a 2-0 Vicryl was used to make a figure of x stitch for reinforcement of the rectal sphincter capsule. Then a 3-0 Vicryl was used to repair in the second degree in the standard fashion, making sure to reapproximate the deep tissue. A second 4-0 was used to repair the right labia minora. After reapproximation of the laceration it was noted that there was still some bleeding from the bed of the laceration that could not be stopped. Therefore the decision was made to place one roll of vaginal packing. A huff catheter was placed in the pt. Bladder, with plan to keep it in place until packing is removed.     Dr. Wong was present and gloved for the entire procedure. Will plan to round on patient in the morning and removed vaginal packing.    Rebecca Sanders MD  Obstetrics and Gynecology, PGY-2  January 30, 2019 3:07 AM     Delayed entry due to patient care. Repair performed just after delivery at 2200    I was present and assisted throughout the procedure,  I agree with the note above  Linda Wong MD

## 2019-01-30 NOTE — PHARMACY-AMINOGLYCOSIDE DOSING SERVICE
Pharmacy Aminoglycoside Initial Note  Date of Service 2019  Patient's  1990  28 year old, female    Weight (Actual):  55.8 kg    Indication: Triple 1    Current estimated CrCl = Estimated Creatinine Clearance: 139.1 mL/min (based on SCr of 0.54 mg/dL).    Creatinine for last 3 days  2019: 11:46 PM Creatinine 0.54 mg/dL  2019:  1:37 PM Creatinine 0.54 mg/dL     Nephrotoxins and other renal medications (From now, onward)    Start     Dose/Rate Route Frequency Ordered Stop    19 0400  gentamicin (GARAMYCIN) infusion 80 mg      1.5 mg/kg × 55.8 kg  over 60 Minutes Intravenous EVERY 8 HOURS 19  gentamicin (GARAMYCIN) 120 mg in sodium chloride 0.9 % 50 mL intermittent infusion      2 mg/kg × 55.8 kg  over 60 Minutes Intravenous ONCE 19 184  ampicillin (OMNIPEN) 2 g vial to attach to  ml bag      2 g  over 15 Minutes Intravenous EVERY 6 HOURS 19 1834      19 0137  ibuprofen (ADVIL/MOTRIN) tablet 800 mg      800 mg Oral ONCE PRN 19 0142            Contrast Orders - past 72 hours (72h ago, onward)    None          Aminoglycoside Levels - past 2 days  No results found for requested labs within last 48 hours.    Aminoglycosides IV Administrations (past 72 hours)      No aminoglycosides orders with administrations in past 72 hours.                    Plan:  1.  Start Gentamicin 120 mg (2 mg/kg) LD, then 80mg (1.5mg/kg) Q8h  2.  Target goals based on conventional dosing  3.  Goal peak level: 6-8 mg/L  4.  Goal trough level: <1 mg/L  5.  Pharmacy will continue to follow and check levels as appropriate in 1-3 Days if continued past 48h      Kary Crum  PharmD,BCPS  2019

## 2019-01-30 NOTE — PROGRESS NOTES
Brief Progress Note:    Went in to check on Marylin and to remove vaginal packing. Notes that she is comfortable in bed, though feels a lot of pressure in her vagina.      Vitals:    01/30/19 0330 01/30/19 0400 01/30/19 0630 01/30/19 0736   BP:   129/60 92/50   Pulse:       Resp:   16 18   Temp:  98.4  F (36.9  C) 98.3  F (36.8  C) 98.4  F (36.9  C)   TempSrc:  Oral Oral Oral   SpO2: 98% 99%     Weight:       Height:         Perineum and labia very edematous. Scant bleeding on ice pack  1 roll of vaginal packing removed without difficulty. Saturate with red blood, but appears to be coming from uterus. Perineum appears hemostatic    Discussed leaving huff in place due to significant labial edema. Recommend reassessment this afternoon for huff removal.    Patient discussed with ARVIND and MD on call.    Rebecca Sanders MD  Obstetrics and Gynecology, PGY-2  January 30, 2019 , 8:26 AM

## 2019-01-31 ENCOUNTER — OFFICE VISIT (OUTPATIENT)
Dept: INTERPRETER SERVICES | Facility: CLINIC | Age: 29
End: 2019-01-31
Payer: COMMERCIAL

## 2019-01-31 VITALS
SYSTOLIC BLOOD PRESSURE: 101 MMHG | HEART RATE: 82 BPM | BODY MASS INDEX: 28.46 KG/M2 | OXYGEN SATURATION: 99 % | RESPIRATION RATE: 16 BRPM | HEIGHT: 55 IN | WEIGHT: 123 LBS | DIASTOLIC BLOOD PRESSURE: 59 MMHG | TEMPERATURE: 97.9 F

## 2019-01-31 LAB — HGB BLD-MCNC: 7.9 G/DL (ref 11.7–15.7)

## 2019-01-31 PROCEDURE — 85018 HEMOGLOBIN: CPT | Performed by: ADVANCED PRACTICE MIDWIFE

## 2019-01-31 PROCEDURE — T1013 SIGN LANG/ORAL INTERPRETER: HCPCS | Mod: U3

## 2019-01-31 PROCEDURE — 25000128 H RX IP 250 OP 636: Performed by: ADVANCED PRACTICE MIDWIFE

## 2019-01-31 PROCEDURE — 25000132 ZZH RX MED GY IP 250 OP 250 PS 637: Performed by: ADVANCED PRACTICE MIDWIFE

## 2019-01-31 PROCEDURE — 90707 MMR VACCINE SC: CPT | Performed by: ADVANCED PRACTICE MIDWIFE

## 2019-01-31 PROCEDURE — 36415 COLL VENOUS BLD VENIPUNCTURE: CPT | Performed by: ADVANCED PRACTICE MIDWIFE

## 2019-01-31 RX ORDER — IBUPROFEN 800 MG/1
800 TABLET, FILM COATED ORAL EVERY 6 HOURS PRN
Qty: 90 TABLET | Refills: 0 | Status: SHIPPED | OUTPATIENT
Start: 2019-01-31 | End: 2019-03-02

## 2019-01-31 RX ORDER — AMOXICILLIN 250 MG
1 CAPSULE ORAL 2 TIMES DAILY
Qty: 60 TABLET | Refills: 0 | Status: SHIPPED | OUTPATIENT
Start: 2019-01-31 | End: 2019-03-02

## 2019-01-31 RX ORDER — DIPHENHYDRAMINE HYDROCHLORIDE 50 MG/ML
25 INJECTION INTRAMUSCULAR; INTRAVENOUS
Status: DISCONTINUED | OUTPATIENT
Start: 2019-01-31 | End: 2019-02-01 | Stop reason: HOSPADM

## 2019-01-31 RX ORDER — ACETAMINOPHEN 325 MG/1
650 TABLET ORAL EVERY 4 HOURS PRN
Qty: 90 TABLET | Refills: 0 | Status: SHIPPED | OUTPATIENT
Start: 2019-01-31 | End: 2019-03-02

## 2019-01-31 RX ORDER — FERROUS SULFATE 325(65) MG
325 TABLET ORAL
Qty: 90 TABLET | Refills: 0 | Status: SHIPPED | OUTPATIENT
Start: 2019-01-31 | End: 2019-05-01

## 2019-01-31 RX ADMIN — ACETAMINOPHEN 650 MG: 325 TABLET, FILM COATED ORAL at 03:10

## 2019-01-31 RX ADMIN — ACETAMINOPHEN 650 MG: 325 TABLET, FILM COATED ORAL at 07:27

## 2019-01-31 RX ADMIN — GENTAMICIN SULFATE 80 MG: 80 INJECTION, SOLUTION INTRAVENOUS at 04:36

## 2019-01-31 RX ADMIN — IBUPROFEN 800 MG: 800 TABLET, FILM COATED ORAL at 09:19

## 2019-01-31 RX ADMIN — IBUPROFEN 800 MG: 800 TABLET, FILM COATED ORAL at 02:30

## 2019-01-31 RX ADMIN — ACETAMINOPHEN 650 MG: 325 TABLET, FILM COATED ORAL at 11:40

## 2019-01-31 RX ADMIN — STANDARDIZED SENNA CONCENTRATE AND DOCUSATE SODIUM 1 TABLET: 8.6; 5 TABLET, FILM COATED ORAL at 09:19

## 2019-01-31 RX ADMIN — IRON SUCROSE 200 MG: 20 INJECTION, SOLUTION INTRAVENOUS at 11:40

## 2019-01-31 RX ADMIN — ACETAMINOPHEN 650 MG: 325 TABLET, FILM COATED ORAL at 15:40

## 2019-01-31 RX ADMIN — MEASLES, MUMPS, AND RUBELLA VIRUS VACCINE LIVE 0.5 ML: 1000; 12500; 1000 INJECTION, POWDER, LYOPHILIZED, FOR SUSPENSION SUBCUTANEOUS at 20:36

## 2019-01-31 RX ADMIN — AMPICILLIN SODIUM 2 G: 2 INJECTION, POWDER, FOR SOLUTION INTRAVENOUS at 02:30

## 2019-01-31 RX ADMIN — IBUPROFEN 800 MG: 800 TABLET, FILM COATED ORAL at 15:40

## 2019-01-31 NOTE — PLAN OF CARE
Data: Vital signs and postpartum checks WDL  Patient eating and drinking normally. Patient able to empty bladder independently and is up ambulating to the bathroom with assist.  Patient performing self cares and is able to care for infant. Breastfeeding on demand. PIV saline locked with IV ATB.  is scheduled at 0830.  Action: Patient medicated during the shift for pain with Tylenol and Ibuprofen with relief after 1 hour. Patient education done see education record.  Response: Positive attachment behaviors observed with infant. Support persons  present.    Plan: Continue with the plan of care

## 2019-01-31 NOTE — PROVIDER NOTIFICATION
01/31/19 0742   Provider Notification   Provider Name/Title Kleven   Method of Notification Electronic Page   Request Evaluate-Remote   Notification Reason Other   Should mom still be getting IV antibiotics at this point?

## 2019-01-31 NOTE — DISCHARGE INSTRUCTIONS
Vaginal Delivery Discharge Instructions: [place]  Actividad:     Pida a los miembros de araujo james y amigos que la ayuden cuando lo necesite.    No ponga nada en araujo vagina hasta que araujo médico lo permita.    Tómese las próximas semanas con calma para que araujo cuerpo tenga tiempo de recuperarse. En parker momento puede hacer cualquier actividad que sienta que puede.    No conduzca si está tomando píldoras para el dolor recetadas por araujo médico. Puede conducir si está tomando píldoras de venta jasvir para el dolor.    Llame a araujo proveedor de atención médica si tiene alguno de estos síntomas:    Empapa carina toalla femenina con galdino en el correr de 1 hora o ve coágulos más grandes que carina pelota de golf.    Sangrado que dura más de 6 semanas.    Tiene carina secreción vaginal que huele mal.     Fiebre de 100.4  F (38  C) o más (temperatura tomada bajo araujo lengua) con o sin escalofríos     Dolor, calambres o sensibilidad graves en la región inferior de araujo vientre.    Aumento del dolor, hinchazón, enrojecimiento o líquido alrededor de de puntos.    Necesidad más frecuente o urgente de orinar (hacer pis), o ardor al hacerlo.    Enrojecimiento, hinchazón o dolor alrededor de carina vena en araujo pierna.    Problemas para amamantar o un área enrojecida o dolorosa en araujo pecho.    Dolor que aumenta o no se va de carina episiotomía o desgarro en el perineo.    Náuseas y vómitos    Dolor en el pecho y tos o dificultad para respirar.    Problemas para manejar la tristeza, ansiedad o depresión.     Si le preocupa hacerse daño o hacerle daño al bebé, llame al médico de inmediato.     Tiene preguntas o inquietudes después de regresar a casa.    Mantenga de kaley limpias:  Lávese siempre las kaley antes de tocar el área de araujo perineo y los puntos.  [place]ison ayuda a reducir araujo riesgo de infección.  Si de kaley no están sucias, puede usar un gel de alcohol para limpiarse las kaley. Mantenga de uñas cortas y limpias.      Vaginal Delivery Discharge  Instructions  Activity:     Ask family and friends for help when you need it.    Do not place anything in your vagina until your doctor approves.    Take it easy for the next few weeks to allow your body to recover. You may do any activities you feel up to at that point.    Do not drive while taking pain pills prescribed by your doctor. You may drive if taking over-the-counter pain pills.    Call your health care provider if you have any of these symptoms:    You soak a sanitary pad with blood within 1 hour, or you see blood clots larger than a golf ball.    Bleeding that lasts more than 6 weeks.    You have vaginal discharge that smells bad.     A fever of 100.4  F (38  C) or higher (temperature taken under your tongue), with or without chills     Severe, pain, cramping or tenderness in your lower belly area.    Increased pain, swelling, redness or fluid around your stitches.    A more frequent or urgent need to urinate (pee), or it burns when you pee.    Redness, swelling or pain around a vein in your leg.    Problems breastfeeding, or a red or painful area on your breast.    Pain that increases or does not go away from an episiotomy or perineal tear.    Nausea and vomiting.    Chest pain and cough or are gasping for air.    Problems coping with sadness, anxiety, or depression.     If you have any concerns about hurting yourself or the baby, call your doctor right away.     You have questions or concerns after you return home.    Keep your hands clean:  Always wash your hands before touching your perineal area and stitches.  This helps reduce your risk of infection.  If your hands aren t dirty, you may use an alcohol hand-rub to clean your hands. Keep your nails clean and short.

## 2019-01-31 NOTE — PLAN OF CARE
VSS. Voiding, had sitz bath and showered. Given IV Iron for symptomatic anemia (declined blood transfusion this AM).  Having cramping pain managed with heat, Ibuprofen and Tylenol.  Nipples intact but sore, using Lanolin.  Continue supportive cares.

## 2019-01-31 NOTE — DISCHARGE SUMMARY
Post Partum Note and Discharge Summary  SIGNIFICANT PROBLEMS:  Patient Active Problem List    Diagnosis Date Noted     Anemia, postpartum 2019     Priority: Medium     2018: Hgb 7.9  19 (admission): Hgb 12.3         (normal spontaneous vaginal delivery) 2019     Priority: Medium     Preeclampsia, third trimester 2019     Priority: Medium     Encounter for triage in pregnant patient 10/30/2018     Priority: Medium     Labor and delivery, indication for care 10/30/2018     Priority: Medium     Nausea and vomiting in pregnancy prior to 22 weeks gestation 2018     Priority: Medium     Supervision of normal first pregnancy, antepartum - [facility] pt. Call [phone] if questions 2018     Priority: Medium     Seen on 10/30/18 for light belly bump and decreased fetal movement. Reactive NST, movement noted by pt in triage.  O+, antibody -  Rubella immune (found rubella nonimmune result from [facility] 2018)  Hep negative            ADMISSION DIAGNOSIS:  IUP at 38w6d   DISCHARGE DIAGNOSIS:     Anemia postpartum   HOSPITAL COURSE:  39w2d  Marylin Mejía is a 28 year old female     [facility] 0130 in early labor and elevated BPs with TP/Cr ratio of 1.24.     Labor Details:  Admit on 19 for IOL d/t pre-eclampsia without SF. Cytotec PO and pitocin for induction with pitocin max of 2mU. AROM @1730 for meconium stained fluid SVE was 4.5/100/0. Epidural with good pain relief. Dx of chorio @1630 and ampicillin and gentamycin given prior to delivery.  C/C/+1 at , strong maternal effort to push shortly thereafter. TMax of 103 reached prior to delivery. NICU called to room and present for delivery.     Delivery Details:  Marylin Mejía, a 28 year old  female delivered a viable infant with apgars of 8   and 9  . Patient was fully dilated and pushing after 4  hours 47  minutes in active labor. Delivery was via vaginal,  "spontaneous  to a sterile field under epidural  anesthesia. Infant delivered in vertex  left  occiput  anterior  position. Anterior and posterior shoulders delivered without difficulty. The cord was clamped x4 and a segment of cord was maintained for cord blood gasses, then cut twice and 3 vessels  were noted. Baby was passed to NICU for resuscitation as she was not immediately vigorous. Cord blood was obtained in routine fashion with the following disposition:lab   Cord complications: none   Placenta delivered at 1/29/2019 10:10 PM . Placental disposition was Pathology . Fundal massage performed and fundus found to be firm.   Episiotomy: none    Perineum, vagina, cervix were inspected, and the following lacerations were noted:   Perineal lacerations: to capsule but not through -  2nd            vaginal laceration noted     repaired by Dr. Wong and Dr. Sanders. Vaginal packing was needed to reach hemostasis. See OB note     Any lacerations were repaired in the usual fashion using 3-0 and 4-0.     Excellent hemostasis was noted. Needle count correct. Infant and patient in delivery room in good and stable condition.       INTERVAL HISTORY:  /79   Pulse 82   Temp 97.3  F (36.3  C) (Oral)   Resp 18   Ht 1.397 m (4' 7\")   Wt 55.8 kg (123 lb)   LMP 04/21/2018   SpO2 99%   Breastfeeding? Unknown   BMI 28.59 kg/m    Pt stable, baby is rooming in.   Breast feeding status:well established. Reports feeding every 2-3 hours, but almost constantly last night.     # Discharge Pain Plan:   - During her hospitalization, Marylin experienced pain due to uterine cramping following vaginal birth and 2nd degree perineal laceration.  The pain plan for discharge was discussed with Marylin and the plan was created in a collaborative fashion.    - Pharmacologic adjuvants:  NSAIDs and Acetaminophen  - Non-pharmacologic adjuvants: including heat pack for abdomen and ice for perineal and labial edema     Complications since 2 " hours post delivery: Fatigue and generalized weakness.     Patient is tolerating activity well. She is able to ambulate to bathroom independently, but patient complains of fatigue and weakness. Denies feeling lightheaded or dizzy. Denies SOB. Voiding without difficulty and had one BM since birth which was not painful. Cramping is relieved by Tylenol, Ibuprofen and hot pack to the abdomen. Lochia is decreasing and patient denies clots.  Perineal pain is tolerable with Tylenol, Ibuprofen and ice pack. The perineum laceration is well approximated with edema present.     Postpartum hemoglobin   Hemoglobin   Date Value Ref Range Status   01/31/2019 7.9 (L) 11.7 - 15.7 g/dL Final      Prenatal Labs:   Lab Results   Component Value Date    ABO O 01/27/2019    RH Pos 01/27/2019    AS Neg 01/27/2019    HEPBANG neg 06/07/2018     Rubella: NOT IMMUNE  History of depression: None reported. Postpartum depression warning signs reviewed.    ASSESSMENT/PLAN:  Normal postpartum exam, Stable Post-partum day #2    Complications: Mildly symptomatic anemia following PPH. Accepting of IV iron now. Discussed risks and benefits of blood transfusion given her symptoms, but she declines at this time. Plan for iron supplementation at home.   Discussed feeding cues, feeding on demand, cluster feeding, signs of a good latch, and signs that baby is getting enough.   Plan d/c to rooming status this evening. Baby will need to stay until morning due to triple I diagnosis.   Home Visit Ordered- No  RTC 2 weeks  Postpartum warning s/s reviewed, including bleeding/clots, fever, mastitis, or depression  Kegels/ crunches  Continue prenatal vitamins.  Acetaminophen and ibuprofen for pain with stool softener.    Birthcontrol planned:Undecided. Fertility and contraception options reviewed. Discussed breastfeeding friendly options. Partner states it took a long time to get pregnant this time, almost 1 year. Discussed benefits to spacing by >18 months. Pt  states she will consider BC options.     PROCEDURES:    Repair of second degree laceration    Current Discharge Medication List      CONTINUE these medications which have NOT CHANGED    Details   Prenatal Vit-Fe Fumarate-FA (PRENATAL VITAMIN PO) Take 1 tablet by mouth daily      RaNITidine HCl (ZANTAC PO) Take 150 mg by mouth At Bedtime      vitamin D3 (CHOLECALCIFEROL) 2000 units tablet Take 1 tablet by mouth daily           IYamel, pawan serving as a scribe; to document services personally performed by  Maris Smith CNM based on data collection and the provider's statements to me.     ANTONELLA Gibson    I agree with the note completed by the ARVIND Student, except for changes made by me. The encounter was performed by me and scribed by the student. The scribed note accurately reflects my personal services and decisions made by me.    ERNESTINA Salinas CNM

## 2019-02-01 NOTE — PLAN OF CARE
Postpartum stable for discharge. PIV removed. MMR given. Breast pump given to pt. EDS was a 2, BC and ROP were turned in and videos were watched. Discharge instructions were gone over with  phone and all questions were answered. Discharge to boarding status with baby.

## 2019-02-04 LAB — COPATH REPORT: NORMAL

## 2022-10-18 NOTE — PROGRESS NOTES
HOSPITAL TRIAGE NOTE  ===================    CHIEF COMPLAINT  ========================  Marylin Mejía is a 27 year old patient presenting today with  at 26w2d for evaluation of decreased fetal movement.    Patient's last menstrual period was 2018.  Estimated Date of Delivery: Feb 3, 2019   Pt receives care from [facility]    HPI  ==================   Pt was working on  and was reaching above her head for an item when she bumped her belly on the table in front of her. Since that time she has been experiencing decreased fetal movement.  Prenatal record and labs reviewed from [facility], through faxed records.    CONTRACTIONS: none  ABDOMINAL PAIN: dull and ache  FETAL MOVEMENT: active since in triage    VAGINAL BLEEDING: none  RUPTURE OF MEMBRANES: no  PELVIC PAIN: none    PREGNANCY COMPLICATIONS: none    # Pain Assessment:  Current Pain Score 10/30/2018   Patient currently in pain? yes   Pain score (0-10) -   Pain location Back       REVIEW OF SYSTEMS  =====================  C: NEGATIVE for fever, chills  I: NEGATIVE for worrisome rashes, moles or lesions  E: NEGATIVE for vision changes or irritation  R: NEGATIVE for significant cough or SOB  CV: NEGATIVE for chest pain, palpitations or varicosities  : NEGATIVE for frequency, dysuria, or hematuria  M: NEGATIVE for significant arthralgias or myalgia  N: NEGATIVE for headache, weakness, dizziness or paresthesias  P: NEGATIVE for changes in mood or affect    PROBLEM LIST  ===============  Patient Active Problem List    Diagnosis Date Noted     Encounter for triage in pregnant patient 10/30/2018     Priority: Medium     Nausea and vomiting in pregnancy prior to 22 weeks gestation 2018     Priority: Medium     Supervision of normal first pregnancy, antepartum - [facility] pt. Call [phone] if questions 2018     Priority: Medium       HISTORIES  ==============  ALLERGIES:    No Known Allergies  PAST MEDICAL HISTORY  History  reviewed. No pertinent past medical history.  SOCIAL HISTORY  Social History     Social History     Marital status: Single     Spouse name: N/A     Number of children: N/A     Years of education: N/A     Occupational History     Not on file.     Social History Main Topics     Smoking status: Never Smoker     Smokeless tobacco: Never Used     Alcohol use No     Drug use: No     Sexual activity: Not on file     Other Topics Concern     Not on file     Social History Narrative     PARTNER: yes  EMPLOYMENT: yes  FAMILY HISTORY  No family history on file.  OB HISTORY  Obstetric History       T0      L0     SAB0   TAB0   Ectopic0   Multiple0   Live Births0       # Outcome Date GA Lbr Arie/2nd Weight Sex Delivery Anes PTL Lv   1 Current                 Prenatal Labs:   Lab Results   Component Value Date    HGB 12.0 2018     O+    EXAM  ============  BP 95/54  Temp 97.8  F (36.6  C) (Oral)  Resp 20  LMP 2018  GENERAL APPEARANCE: healthy, alert and no distress  ABDOMEN:  soft, nontender, no epigastric pain  NEURO: Denies headache, blurred vision, other vision changes  PSYCH: mentation appears normal. and affect normal/bright  MS/ LEGS:nontender bilaterally    CONTRACTIONS: none   FETAL HEART TONES: continuous EFM- baseline 125-130 with moderate variability and positive accelerations appropriate for gestation. No decelerations.  NST: REACTIVE    PELVIC EXAM: deferred  BLOOD: no  DISCHARGE: none    ROM: no  ROMPLUS: not done    LABS: none    DIAGNOSIS  ============  26w2d [facility] the [facility] Triage for light belly bump at work last .   NST: REACTIVE  Fetal Heart rate tracing:category one    PLAN  ============  Discharge to home with labor instuctions per discharge instruction form.  Discussed how to reach a midwife or nurse with questions about pregnancy. Reinforced it is important to bring questions to prenatal visits to discuss with midwives, normalcy of having lots of questions in first  pregnancy.   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   She will increase her hydration, has been drinking less than 32oz a day.   Has started taking Zantac, with improvement of her symptoms.   Call or return to the [facility] with contractions, cramping, abdominal or pelvic pain, vaginal bleeding, leaking fluid or decreased fetal movement.  Follow up- in clinic for next EOB visit, or prn.    Corie Parisi CNM   Azelaic Acid Pregnancy And Lactation Text: This medication is considered safe during pregnancy and breast feeding.

## 2023-09-20 ENCOUNTER — APPOINTMENT (OUTPATIENT)
Dept: INTERPRETER SERVICES | Facility: CLINIC | Age: 33
End: 2023-09-20
Payer: MEDICAID

## 2023-09-21 ENCOUNTER — HOSPITAL ENCOUNTER (OUTPATIENT)
Dept: ULTRASOUND IMAGING | Facility: CLINIC | Age: 33
Discharge: HOME | End: 2023-09-21
Attending: MIDWIFE | Admitting: MIDWIFE
Payer: MEDICAID

## 2023-09-21 DIAGNOSIS — Z34.01 NORMAL FIRST PREGNANCY CONFIRMED, CURRENTLY IN FIRST TRIMESTER: ICD-10-CM

## 2023-09-21 PROCEDURE — 76801 OB US < 14 WKS SINGLE FETUS: CPT

## 2023-09-21 PROCEDURE — 76801 OB US < 14 WKS SINGLE FETUS: CPT | Mod: 26 | Performed by: RADIOLOGY

## 2023-09-27 ENCOUNTER — HOSPITAL ENCOUNTER (EMERGENCY)
Facility: CLINIC | Age: 33
Discharge: HOME | End: 2023-09-27
Attending: FAMILY MEDICINE | Admitting: FAMILY MEDICINE
Payer: MEDICAID

## 2023-09-27 ENCOUNTER — APPOINTMENT (OUTPATIENT)
Dept: INTERPRETER SERVICES | Facility: CLINIC | Age: 33
End: 2023-09-27
Payer: MEDICAID

## 2023-09-27 ENCOUNTER — APPOINTMENT (OUTPATIENT)
Dept: ULTRASOUND IMAGING | Facility: CLINIC | Age: 33
End: 2023-09-27
Attending: FAMILY MEDICINE
Payer: MEDICAID

## 2023-09-27 VITALS
TEMPERATURE: 98.8 F | HEART RATE: 71 BPM | WEIGHT: 105 LBS | SYSTOLIC BLOOD PRESSURE: 106 MMHG | OXYGEN SATURATION: 99 % | DIASTOLIC BLOOD PRESSURE: 61 MMHG | RESPIRATION RATE: 16 BRPM

## 2023-09-27 DIAGNOSIS — O20.9 VAGINAL BLEEDING IN PREGNANCY, FIRST TRIMESTER: ICD-10-CM

## 2023-09-27 LAB
ALBUMIN UR-MCNC: 70 MG/DL
ANION GAP SERPL CALCULATED.3IONS-SCNC: 16 MMOL/L (ref 7–15)
APPEARANCE UR: CLEAR
BASOPHILS # BLD AUTO: 0 10E3/UL (ref 0–0.2)
BASOPHILS NFR BLD AUTO: 0 %
BILIRUB UR QL STRIP: NEGATIVE
BUN SERPL-MCNC: 5.9 MG/DL (ref 6–20)
C TRACH DNA SPEC QL PROBE+SIG AMP: NEGATIVE
CALCIUM SERPL-MCNC: 9.6 MG/DL (ref 8.6–10)
CAOX CRY #/AREA URNS HPF: ABNORMAL /HPF
CHLORIDE SERPL-SCNC: 100 MMOL/L (ref 98–107)
CLUE CELLS: PRESENT
COLOR UR AUTO: YELLOW
CREAT SERPL-MCNC: 0.55 MG/DL (ref 0.51–0.95)
DEPRECATED HCO3 PLAS-SCNC: 22 MMOL/L (ref 22–29)
EGFRCR SERPLBLD CKD-EPI 2021: >90 ML/MIN/1.73M2
EOSINOPHIL # BLD AUTO: 0 10E3/UL (ref 0–0.7)
EOSINOPHIL NFR BLD AUTO: 0 %
ERYTHROCYTE [DISTWIDTH] IN BLOOD BY AUTOMATED COUNT: 12.2 % (ref 10–15)
GLUCOSE SERPL-MCNC: 95 MG/DL (ref 70–99)
GLUCOSE UR STRIP-MCNC: NEGATIVE MG/DL
HCG INTACT+B SERPL-ACNC: ABNORMAL MIU/ML
HCG UR QL: POSITIVE
HCT VFR BLD AUTO: 38 % (ref 35–47)
HGB BLD-MCNC: 13.4 G/DL (ref 11.7–15.7)
HGB UR QL STRIP: ABNORMAL
HYALINE CASTS: 1 /LPF
IMM GRANULOCYTES # BLD: 0 10E3/UL
IMM GRANULOCYTES NFR BLD: 0 %
INTERNAL QC OK POCT: ABNORMAL
KETONES UR STRIP-MCNC: 60 MG/DL
LEUKOCYTE ESTERASE UR QL STRIP: ABNORMAL
LYMPHOCYTES # BLD AUTO: 2.4 10E3/UL (ref 0.8–5.3)
LYMPHOCYTES NFR BLD AUTO: 22 %
MCH RBC QN AUTO: 30.9 PG (ref 26.5–33)
MCHC RBC AUTO-ENTMCNC: 35.3 G/DL (ref 31.5–36.5)
MCV RBC AUTO: 88 FL (ref 78–100)
MONOCYTES # BLD AUTO: 0.8 10E3/UL (ref 0–1.3)
MONOCYTES NFR BLD AUTO: 7 %
MUCOUS THREADS #/AREA URNS LPF: PRESENT /LPF
N GONORRHOEA DNA SPEC QL NAA+PROBE: NEGATIVE
NEUTROPHILS # BLD AUTO: 7.7 10E3/UL (ref 1.6–8.3)
NEUTROPHILS NFR BLD AUTO: 71 %
NITRATE UR QL: NEGATIVE
NRBC # BLD AUTO: 0 10E3/UL
NRBC BLD AUTO-RTO: 0 /100
PH UR STRIP: 6.5 [PH] (ref 5–7)
PLATELET # BLD AUTO: 218 10E3/UL (ref 150–450)
POCT KIT EXPIRATION DATE: ABNORMAL
POCT KIT LOT NUMBER: ABNORMAL
POTASSIUM SERPL-SCNC: 3.6 MMOL/L (ref 3.4–5.3)
RBC # BLD AUTO: 4.33 10E6/UL (ref 3.8–5.2)
RBC URINE: 5 /HPF
SODIUM SERPL-SCNC: 138 MMOL/L (ref 135–145)
SP GR UR STRIP: 1.03 (ref 1–1.03)
SQUAMOUS EPITHELIAL: 4 /HPF
TRICHOMONAS, WET PREP: ABNORMAL
UROBILINOGEN UR STRIP-MCNC: NORMAL MG/DL
WBC # BLD AUTO: 10.9 10E3/UL (ref 4–11)
WBC URINE: 4 /HPF
WBC'S/HIGH POWER FIELD, WET PREP: ABNORMAL
YEAST, WET PREP: ABNORMAL

## 2023-09-27 PROCEDURE — 258N000003 HC RX IP 258 OP 636: Performed by: FAMILY MEDICINE

## 2023-09-27 PROCEDURE — 84702 CHORIONIC GONADOTROPIN TEST: CPT | Performed by: FAMILY MEDICINE

## 2023-09-27 PROCEDURE — 81001 URINALYSIS AUTO W/SCOPE: CPT | Performed by: FAMILY MEDICINE

## 2023-09-27 PROCEDURE — 81025 URINE PREGNANCY TEST: CPT | Performed by: FAMILY MEDICINE

## 2023-09-27 PROCEDURE — 85025 COMPLETE CBC W/AUTO DIFF WBC: CPT | Performed by: FAMILY MEDICINE

## 2023-09-27 PROCEDURE — 87491 CHLMYD TRACH DNA AMP PROBE: CPT | Performed by: FAMILY MEDICINE

## 2023-09-27 PROCEDURE — 99284 EMERGENCY DEPT VISIT MOD MDM: CPT | Performed by: FAMILY MEDICINE

## 2023-09-27 PROCEDURE — 76801 OB US < 14 WKS SINGLE FETUS: CPT

## 2023-09-27 PROCEDURE — 96374 THER/PROPH/DIAG INJ IV PUSH: CPT | Performed by: FAMILY MEDICINE

## 2023-09-27 PROCEDURE — 99285 EMERGENCY DEPT VISIT HI MDM: CPT | Mod: 25 | Performed by: FAMILY MEDICINE

## 2023-09-27 PROCEDURE — 87210 SMEAR WET MOUNT SALINE/INK: CPT | Performed by: FAMILY MEDICINE

## 2023-09-27 PROCEDURE — 80048 BASIC METABOLIC PNL TOTAL CA: CPT | Performed by: FAMILY MEDICINE

## 2023-09-27 PROCEDURE — 36415 COLL VENOUS BLD VENIPUNCTURE: CPT | Performed by: FAMILY MEDICINE

## 2023-09-27 PROCEDURE — 96361 HYDRATE IV INFUSION ADD-ON: CPT | Performed by: FAMILY MEDICINE

## 2023-09-27 PROCEDURE — 250N000011 HC RX IP 250 OP 636: Performed by: FAMILY MEDICINE

## 2023-09-27 RX ORDER — PRENATAL VIT 27,CALC/IRON/FA 60 MG-1 MG
1 TABLET ORAL DAILY
COMMUNITY
Start: 2023-09-19 | End: 2023-12-18

## 2023-09-27 RX ORDER — ONDANSETRON 2 MG/ML
4 INJECTION INTRAMUSCULAR; INTRAVENOUS ONCE
Status: COMPLETED | OUTPATIENT
Start: 2023-09-27 | End: 2023-09-27

## 2023-09-27 RX ORDER — DM/P-EPHED/ACETAMINOPH/DOXYLAM
1 LIQUID (ML) ORAL DAILY
Status: ON HOLD | COMMUNITY
Start: 2023-09-25 | End: 2024-04-05

## 2023-09-27 RX ORDER — ONDANSETRON 4 MG/1
4 TABLET, FILM COATED ORAL
Status: ON HOLD | COMMUNITY
Start: 2023-09-22 | End: 2024-04-05

## 2023-09-27 RX ADMIN — ONDANSETRON 4 MG: 2 INJECTION INTRAMUSCULAR; INTRAVENOUS at 08:18

## 2023-09-27 RX ADMIN — SODIUM CHLORIDE 1000 ML: 9 INJECTION, SOLUTION INTRAVENOUS at 08:18

## 2023-09-27 ASSESSMENT — ACTIVITIES OF DAILY LIVING (ADL): ADLS_ACUITY_SCORE: 35

## 2023-09-27 NOTE — ED TRIAGE NOTES
Had some low back pain when spotting started.     Triage Assessment       Row Name 09/27/23 0792       Triage Assessment (Adult)    Airway WDL WDL       Respiratory WDL    Respiratory WDL WDL       Skin Circulation/Temperature WDL    Skin Circulation/Temperature WDL WDL       Cardiac WDL    Cardiac WDL WDL       Peripheral/Neurovascular WDL    Peripheral Neurovascular WDL WDL       Cognitive/Neuro/Behavioral WDL    Cognitive/Neuro/Behavioral WDL WDL

## 2023-09-27 NOTE — ED PROVIDER NOTES
ED Provider Note  [facility]      History     Chief Complaint   Patient presents with    Vaginal Bleeding - Pregnant     10 weeks pregnant.  Brown discharge spotting, started yesterday.  No clots, no cramping.     HPI  Marylin Mejía is a 32 year old female who presents with vaginal bleeding in early pregnancy.  Her last menstrual period was sometime in July, she does not recall the date.  She is  2 para 1-0-0-1.  She developed some cramping with brown spotting consistent with the amount of a typical menstrual period yesterday.  Today the bleeding is less and there is no cramping but there was still brown discharge when she wiped.  No other abdominal pain.  No fever or chills.  No urinary symptoms.  She is  and monogamous.  No other abnormal vaginal discharge.    Past Medical History  Past Medical History:   Diagnosis Date    Preeclampsia      History reviewed. No pertinent surgical history.  D 5000 125 MCG (5000 UT) CAPS  ondansetron (ZOFRAN) 4 MG tablet  Prenatal Vit-Fe Fumarate-FA (TRINATAL RX 1) 60-1 MG TABS      Not on File  Family History  No family history on file.  Social History          A medically appropriate review of systems was performed with pertinent positives and negatives noted in the HPI, and all other systems negative.    Physical Exam   BP: 111/74  Pulse: 75  Temp: 98.8  F (37.1  C)  Resp: 16  Weight: 47.6 kg (105 lb)  SpO2: 97 %  Physical Exam  Vitals and nursing note reviewed.   Constitutional:       General: She is not in acute distress.     Appearance: Normal appearance. She is not diaphoretic.   HENT:      Head: Atraumatic.      Mouth/Throat:      Mouth: Mucous membranes are moist.   Eyes:      General: No scleral icterus.     Conjunctiva/sclera: Conjunctivae normal.   Cardiovascular:      Rate and Rhythm: Normal rate.      Heart sounds: Normal heart sounds.   Pulmonary:      Effort: No respiratory distress.      Breath sounds: Normal  breath sounds.   Abdominal:      General: Abdomen is flat.   Musculoskeletal:      Cervical back: Neck supple.   Skin:     General: Skin is warm.      Findings: No rash.   Neurological:      Mental Status: She is alert.           ED Course, Procedures, & Data      Procedures                     Results for orders placed or performed during the hospital encounter of 09/27/23   US OB < 14 Weeks Single     Status: None (Preliminary result)    Narrative    US OB LESS THAN 14 WEEKS SINGLE-TRANSABDOMINAL 9/27/2023 9:56 AM    HISTORY: First trimester vaginal bleed.    COMPARISON: None.    FINDINGS:   There is a single live intrauterine pregnancy of approximately 10  weeks 6 days gestation. The EDC based on this ultrasound is 4/18/2024.  Fetal heart rate is 169 beats per minute. Fetal anatomic detail is  limited due to early gestational age with no gross abnormality seen.  Amniotic fluid is unable to be assessed due to early gestational age.  Placenta is not yet adequately visible due to early gestational age.  Small subchorionic hemorrhage towards the left is 1.8 x 0.7 x 2.4 cm.    Maternal adnexa: Normal right and left ovaries.    Patient reported LMP: Unknown  LMP gestational age: Unknown      Impression    IMPRESSION: Early single live intrauterine pregnancy of approximately  10 weeks 6 days gestation. Small subchorionic hemorrhage as above.    UA with Microscopic reflex to Culture     Status: Abnormal    Specimen: Urine, Clean Catch   Result Value Ref Range    Color Urine Yellow Colorless, Straw, Light Yellow, Yellow    Appearance Urine Clear Clear    Glucose Urine Negative Negative mg/dL    Bilirubin Urine Negative Negative    Ketones Urine 60 (A) Negative mg/dL    Specific Gravity Urine 1.028 1.003 - 1.035    Blood Urine Moderate (A) Negative    pH Urine 6.5 5.0 - 7.0    Protein Albumin Urine 70 (A) Negative mg/dL    Urobilinogen Urine Normal Normal, 2.0 mg/dL    Nitrite Urine Negative Negative    Leukocyte Esterase  Urine Small (A) Negative    Mucus Urine Present (A) None Seen /LPF    Calcium Oxalate Crystals Urine Few (A) None Seen /HPF    RBC Urine 5 (H) <=2 /HPF    WBC Urine 4 <=5 /HPF    Squamous Epithelials Urine 4 (H) <=1 /HPF    Hyaline Casts Urine 1 <=2 /LPF    Narrative    Urine Culture not indicated   Basic metabolic panel     Status: Abnormal   Result Value Ref Range    Sodium 138 135 - 145 mmol/L    Potassium 3.6 3.4 - 5.3 mmol/L    Chloride 100 98 - 107 mmol/L    Carbon Dioxide (CO2) 22 22 - 29 mmol/L    Anion Gap 16 (H) 7 - 15 mmol/L    Urea Nitrogen 5.9 (L) 6.0 - 20.0 mg/dL    Creatinine 0.55 0.51 - 0.95 mg/dL    GFR Estimate >90 >60 mL/min/1.73m2    Calcium 9.6 8.6 - 10.0 mg/dL    Glucose 95 70 - 99 mg/dL   HCG quantitative pregnancy     Status: Abnormal   Result Value Ref Range    hCG Quantitative 89,236 (H) <5 mIU/mL   CBC with platelets and differential     Status: None   Result Value Ref Range    WBC Count 10.9 4.0 - 11.0 10e3/uL    RBC Count 4.33 3.80 - 5.20 10e6/uL    Hemoglobin 13.4 11.7 - 15.7 g/dL    Hematocrit 38.0 35.0 - 47.0 %    MCV 88 78 - 100 fL    MCH 30.9 26.5 - 33.0 pg    MCHC 35.3 31.5 - 36.5 g/dL    RDW 12.2 10.0 - 15.0 %    Platelet Count 218 150 - 450 10e3/uL    % Neutrophils 71 %    % Lymphocytes 22 %    % Monocytes 7 %    % Eosinophils 0 %    % Basophils 0 %    % Immature Granulocytes 0 %    NRBCs per 100 WBC 0 <1 /100    Absolute Neutrophils 7.7 1.6 - 8.3 10e3/uL    Absolute Lymphocytes 2.4 0.8 - 5.3 10e3/uL    Absolute Monocytes 0.8 0.0 - 1.3 10e3/uL    Absolute Eosinophils 0.0 0.0 - 0.7 10e3/uL    Absolute Basophils 0.0 0.0 - 0.2 10e3/uL    Absolute Immature Granulocytes 0.0 <=0.4 10e3/uL    Absolute NRBCs 0.0 10e3/uL   hCG qual urine POCT     Status: Abnormal   Result Value Ref Range    HCG Qual Urine Positive Negative    Internal QC Check POCT Valid Valid    POCT Kit Lot Number [id number]     POCT Kit Expiration Date 09/06/2024    Wet prep     Status: Abnormal    Specimen: Vagina; Swab    Result Value Ref Range    Trichomonas Absent Absent    Yeast Absent Absent    Clue Cells Present (A) Absent    WBCs/high power field 3+ (A) None   CBC with platelets differential     Status: None    Narrative    The following orders were created for panel order CBC with platelets differential.  Procedure                               Abnormality         Status                     ---------                               -----------         ------                     CBC with platelets and d...[[id number]]                      Final result                 Please view results for these tests on the individual orders.     Medications   sodium chloride 0.9% BOLUS 1,000 mL (1,000 mLs Intravenous $New Bag 9/27/23 0818)   ondansetron (ZOFRAN) injection 4 mg (4 mg Intravenous $Given 9/27/23 0818)     Labs Ordered and Resulted from Time of ED Arrival to Time of ED Departure   ROUTINE UA WITH MICROSCOPIC REFLEX TO CULTURE - Abnormal       Result Value    Color Urine Yellow      Appearance Urine Clear      Glucose Urine Negative      Bilirubin Urine Negative      Ketones Urine 60 (*)     Specific Gravity Urine 1.028      Blood Urine Moderate (*)     pH Urine 6.5      Protein Albumin Urine 70 (*)     Urobilinogen Urine Normal      Nitrite Urine Negative      Leukocyte Esterase Urine Small (*)     Mucus Urine Present (*)     Calcium Oxalate Crystals Urine Few (*)     RBC Urine 5 (*)     WBC Urine 4      Squamous Epithelials Urine 4 (*)     Hyaline Casts Urine 1     BASIC METABOLIC PANEL - Abnormal    Sodium 138      Potassium 3.6      Chloride 100      Carbon Dioxide (CO2) 22      Anion Gap 16 (*)     Urea Nitrogen 5.9 (*)     Creatinine 0.55      GFR Estimate >90      Calcium 9.6      Glucose 95     HCG QUANTITATIVE PREGNANCY - Abnormal    hCG Quantitative 89,236 (*)    HCG QUALITATIVE URINE POCT - Abnormal    HCG Qual Urine Positive      Internal QC Check POCT Valid      POCT Kit Lot Number [id number]      POCT Kit Expiration Date  2024     WET PREPARATION - Abnormal    Trichomonas Absent      Yeast Absent      Clue Cells Present (*)     WBCs/high power field 3+ (*)    CBC WITH PLATELETS AND DIFFERENTIAL    WBC Count 10.9      RBC Count 4.33      Hemoglobin 13.4      Hematocrit 38.0      MCV 88      MCH 30.9      MCHC 35.3      RDW 12.2      Platelet Count 218      % Neutrophils 71      % Lymphocytes 22      % Monocytes 7      % Eosinophils 0      % Basophils 0      % Immature Granulocytes 0      NRBCs per 100 WBC 0      Absolute Neutrophils 7.7      Absolute Lymphocytes 2.4      Absolute Monocytes 0.8      Absolute Eosinophils 0.0      Absolute Basophils 0.0      Absolute Immature Granulocytes 0.0      Absolute NRBCs 0.0     CHLAMYDIA TRACHOMATIS/NEISSERIA GONORRHOEAE BY PCR     US OB < 14 Weeks Single   Preliminary Result   IMPRESSION: Early single live intrauterine pregnancy of approximately   10 weeks 6 days gestation. Small subchorionic hemorrhage as above.              Critical care was not performed.     Medical Decision Making  The patient's presentation was of moderate complexity (an acute illness with systemic symptoms).    The patient's evaluation involved:  review of 1 test result(s) ordered prior to this encounter (reviewed Rh type from previous OB/GYN visit)  ordering and/or review of 3+ test(s) in this encounter (see separate area of note for details)  independent interpretation of testing performed by another health professional (pelvic ultrasound personally reviewed, also reviewed radiologist report)    The patient's management necessitated moderate risk (prescription drug management including medications given in the ED).    Assessment & Plan    -year-old  2 para 1-0-0-1 at approximately 10 weeks gestation by menstrual dates presenting with vaginal bleeding and cramping.  Also has persistent nausea and vomiting.  The initial differential diagnosis includes ectopic pregnancy, incomplete or complete spontaneous AB,  threatened AB, vaginitis, ovarian cyst or torsion, trauma.  On exam her vital signs are normal and she appears in no distress.  She has very mild tenderness in the suprapubic area, no peritoneal signs.  On pelvic exam she has old brown blood in the vaginal vault, no active bleeding from the cervix, cervix is closed and there is no adnexal tenderness.  His quantitative beta-hCG is 89,236.  He is known to be Rh type positive.  CBC and basic metabolic profile with signs of mild dehydration.  She was given a liter of IV fluids and Zofran and feels much better.  Pelvic ultrasound shows live intrauterine pregnancy, 10 weeks 6 days, small associated subchorionic hemorrhage which could explain the bleeding.  Patient has bleeding in the late first trimester and a small subchorionic hemorrhage.  She is known to be Rh+.  Her other findings show mild dehydration.  Discussed these findings with the patient, she will continue to follow-up with her prenatal clinic, and was provided bleeding precautions.  She is already taking Zofran at home for nausea.    I have reviewed the nursing notes. I have reviewed the findings, diagnosis, plan and need for follow up with the patient.    New Prescriptions    No medications on file       Final diagnoses:   Vaginal bleeding in pregnancy, first trimester   Subchorionic hemorrhage of placenta in first trimester, single or unspecified fetus       Andrew Young MD  [facility] EMERGENCY DEPARTMENT  9/27/2023     Andrew Young MD  09/27/23 1118

## 2023-09-27 NOTE — DISCHARGE INSTRUCTIONS
Please make an appointment to follow up with your OB/GYN for ongoing prenatal care as soon as possible.

## 2023-10-04 ENCOUNTER — APPOINTMENT (OUTPATIENT)
Dept: INTERPRETER SERVICES | Facility: CLINIC | Age: 33
End: 2023-10-04
Payer: MEDICAID

## 2023-10-04 ENCOUNTER — MEDICAL CORRESPONDENCE (OUTPATIENT)
Dept: HEALTH INFORMATION MANAGEMENT | Facility: CLINIC | Age: 33
End: 2023-10-04
Payer: MEDICAID

## 2023-10-04 ENCOUNTER — TRANSCRIBE ORDERS (OUTPATIENT)
Dept: MATERNAL FETAL MEDICINE | Facility: CLINIC | Age: 33
End: 2023-10-04
Payer: MEDICAID

## 2023-10-04 DIAGNOSIS — O26.90 PREGNANCY RELATED CONDITION, ANTEPARTUM: Primary | ICD-10-CM

## 2023-10-05 ENCOUNTER — PRE VISIT (OUTPATIENT)
Dept: MATERNAL FETAL MEDICINE | Facility: CLINIC | Age: 33
End: 2023-10-05
Payer: MEDICAID

## 2023-10-10 ENCOUNTER — OFFICE VISIT (OUTPATIENT)
Dept: MATERNAL FETAL MEDICINE | Facility: CLINIC | Age: 33
End: 2023-10-10
Attending: OBSTETRICS & GYNECOLOGY
Payer: MEDICAID

## 2023-10-10 ENCOUNTER — LAB (OUTPATIENT)
Dept: LAB | Facility: CLINIC | Age: 33
End: 2023-10-10
Attending: OBSTETRICS & GYNECOLOGY
Payer: MEDICAID

## 2023-10-10 ENCOUNTER — HOSPITAL ENCOUNTER (OUTPATIENT)
Dept: ULTRASOUND IMAGING | Facility: CLINIC | Age: 33
Discharge: HOME | End: 2023-10-10
Attending: OBSTETRICS & GYNECOLOGY
Payer: MEDICAID

## 2023-10-10 ENCOUNTER — MEDICAL CORRESPONDENCE (OUTPATIENT)
Dept: HEALTH INFORMATION MANAGEMENT | Facility: CLINIC | Age: 33
End: 2023-10-10

## 2023-10-10 DIAGNOSIS — Z36.0 ENCOUNTER FOR ANTENATAL SCREENING FOR CHROMOSOMAL ANOMALIES: Primary | ICD-10-CM

## 2023-10-10 DIAGNOSIS — Z36.0 ENCOUNTER FOR ANTENATAL SCREENING FOR CHROMOSOMAL ANOMALIES: ICD-10-CM

## 2023-10-10 DIAGNOSIS — O26.90 PREGNANCY RELATED CONDITION, ANTEPARTUM: ICD-10-CM

## 2023-10-10 DIAGNOSIS — Z36.9 FIRST TRIMESTER SCREENING: Primary | ICD-10-CM

## 2023-10-10 DIAGNOSIS — Z36.89 ENCOUNTER FOR FETAL ANATOMIC SURVEY: ICD-10-CM

## 2023-10-10 PROCEDURE — 96040 HC GENETIC COUNSELING, EACH 30 MINUTES: CPT | Performed by: GENETIC COUNSELOR, MS

## 2023-10-10 PROCEDURE — 76813 OB US NUCHAL MEAS 1 GEST: CPT | Mod: 26 | Performed by: OBSTETRICS & GYNECOLOGY

## 2023-10-10 PROCEDURE — 76813 OB US NUCHAL MEAS 1 GEST: CPT

## 2023-10-10 PROCEDURE — 36415 COLL VENOUS BLD VENIPUNCTURE: CPT

## 2023-10-10 NOTE — PROGRESS NOTES
"Please see \"Imaging\" tab under \"Chart Review\" for details of today's US at the [facility].    Julio Tipton MD  Maternal-Fetal Medicine    "

## 2023-10-11 NOTE — PROGRESS NOTES
[facility] [place]  Genetic Counseling Consult    Patient:  Marylin Mejía YOB: 1990   Date of Service:  10/10/23   MRN: [id number]    Marylin was seen at the [facility] for genetic consultation. The indication for genetic counseling is desire to discuss options for genetic screening and diagnostics. The patient was accompanied to this visit by their partner, Liang.    The session was conducted with a Armenian ipad  ([id number]) due to the patient speaking limited English.      IMPRESSION/ PLAN   1. Marylin has not had genetic screening in this pregnancy but elected to have screening today.     2. During today's [facility] visit, Marylin had a blood draw for expanded non-invasive prenatal testing (also called NIPT, NIPS, or cell-free DNA) through [employer]. The expanded NIPT screens for trisomy 21, 18, and 13 and 22q11.2 deletion syndrome. The patient opted to screen for sex chromosome aneuploidies, including reported fetal sex.  In accordance with current guidelines, other microdeletion syndromes and rare autosomal trisomies were not included, but reflex to include these conditions remains available with expanded NIPT if there is an indication later in the pregnancy. Results are expected in 7-14 days. The patient will be called with results and if they do not answer they requested a detailed message with results on their voicemail, including the predicted fetal sex information.  Patient was informed that results, including fetal sex, will be available in [place].    3. Since the patient chose aneuploidy screening via NIPT, quad screen is NOT recommended in the second trimester. If the patient desires screening for open neural tube defects, maternal serum AFP only is recommended, ideally between 16-18 weeks gestation.    4. Marylin had a level II comprehensive anatomy ultrasound today. Please see the ultrasound  "report for further details.    5. Further recommendations include a fetal anatomy ultrasound around 18-20 weeks, which will most likely be completed with their primary OB provider.    PREGNANCY HISTORY   /Parity:       Marylin's pregnancy history is significant for:   2019 full-term delivery, female, alive and well. Pregnancy complicated by preeclampsia.    CURRENT PREGNANCY   Current Age: 32 year old   Age at Delivery: 33 year old      KEN: 2024, by Ultrasound  Gestational Age: 12w5d    This pregnancy is a single gestation.     This pregnancy was conceived spontaneously.    MEDICAL HISTORY   Marylin s previous pregnancy was complicated by preeclampsia. She has already discussed prophylaxis in the pregnancy with her OB provider.       FAMILY HISTORY   A three-generation pedigree was obtained previously by a genetic counselor and updated today. The original and updated version is scanned under the \"Media\" tab in Epic.  The family history was reported by Marylin.    There were no significant updates to the family history today.     Otherwise, the reported family history is unremarkable for multiple miscarriages, stillbirths, birth defects, intellectual disabilities, known genetic conditions, and consanguinity.       RISK ASSESSMENT FOR INHERITED CONDITIONS AND CARRIER SCREENING OPTIONS   Expanded carrier screening is available to screen for autosomal recessive conditions and X-linked conditions in a large list of genes. Carrier screening does not test the pregnancy but gives a risk assessment for the pregnancy and future pregnancies to have the condition. Expanded carrier screening is designed to identify carrier status for conditions that are primarily childhood or adolescent onset. Expanded carrier screening does not evaluate for adult-onset conditions such as hereditary cancer syndromes, dementia/ Alzheimer's disease, or cardiovascular disease risk factors. Additionally, expanded carrier screening is " not comprehensive for all known genetic diseases or inherited conditions. It does not intentionally screen for autosomal dominant conditions.  screening was reviewed. About [place] Screening    Autosomal recessive conditions happen when a mutation has been inherited from the egg and sperm and include conditions like cystic fibrosis, thalassemia, hearing loss, spinal muscular atrophy, and more. We reviewed that when both biological parents carry a harmful genetic change in a gene associated with autosomal recessive inheritance, each of their pregnancies has a 1 in 4 (25%) chance to be affected by that condition. X-linked conditions happen when a mutation has been inherited from the egg and include conditions like fragile X syndrome. With X-linked conditions, the specific risk generally depends on the chromosomal sex of the fetus, with XY individuals (generally male) being most severely affected.     The patient does NOT have a family history of known inherited conditions. This does NOT mean the patient and/or their partner is not a carrier of a condition. Approximately 90% of couples at an increased reproductive risk for an inherited condition have no family history of that condition.     The patient nor their partner have had carrier screening previously.     The patient declined the carrier screening options. They are aware the option will remain, and they can contact us if they would like to pursue screening.      RISK ASSESSMENT FOR CHROMOSOME CONDITIONS   We discussed that the risk for fetal chromosome abnormalities increases with maternal age. We discussed specific features of common chromosome abnormalities, including Down syndrome, trisomy 13, trisomy 18, and sex chromosome trisomies.    At age 33 at midtrimester, the risk to have a baby with Down syndrome is 1 in 597.  At age 33 at midtrimester, the risk to have a baby with any chromosome abnormality is 1 in 299.     Marylin has not had genetic  screening in this pregnancy but elected to have screening today.      GENETIC TESTING OPTIONS   Genetic testing during a pregnancy includes screening and diagnostic procedures.      Screening tests are non-invasive which means no risk to the pregnancy and includes ultrasounds and blood work. The benefits and limitations of screening were reviewed. Screening tests provide a risk assessment (chance) specific to the pregnancy for certain fetal chromosome abnormalities but cannot definitively diagnose or exclude a fetal chromosome abnormality. Follow-up genetic counseling and consideration of diagnostic testing is recommended with any abnormal screening result. Diagnostic testing during a pregnancy is more certain and can test for more conditions. However, the tests do have a risk of miscarriage that requires careful consideration. These tests can detect fetal chromosome abnormalities with greater than 99% certainty. Results can be compromised by maternal cell contamination or mosaicism and are limited by the resolution of current genetic testing technology.     There is no screening or diagnostic test that detects all forms of birth defects or intellectual disability.     We discussed the following screening options:     Non-invasive prenatal testing (NIPT)  Also called cell-free DNA screening because it detect chromosome fragments from the placenta in the pregnant person's blood.  Can be done any time after 10 weeks gestation  Standard recommendation for NIPT screens for trisomy 21, trisomy 18, trisomy 13, with the option of adding sex chromosome aneuploidies, without or without predicted sex  Cannot screen for open neural tube defects, maternal serum AFP after 15 weeks is recommended  New NIPT options include screening for other trisomies, microdeletion syndromes, and in some cases fetal blood antigens. Guidelines do not recommend these conditions are included in standard screening. These options have limitations and  should be discussed with a genetic counselor.   However, current (2023) ACMG guidelines do recommend that screening for one microdeletion syndrome, called 22q11.2 deletion syndrome be offered to all pregnant patients. 22q11.2 deletion syndrome has an estimated prevalence of 1 in 990 to 1 in 2148 (0.05-0.1%). Risk is not thought to increase with maternal age. Clinical features are variable but include congenital heart defects, cleft palate, developmental delays, immune system deficiencies, and hearing loss. Approximately 90% of cases are de maría (a sporadic new change in a pregnancy). Cell-free DNA screening for 22q11.2 deletion syndrome is available (Expanded NIPS through [employer]). We discussed the limitations of cell-free DNA screening in detecting microdeletions and the possiblity of false positives and false negatives. Expanded NIPS also allows for reflex to include other microdeletion conditions and rare autosomal trisomies if an indication would arise later in the pregnancy. The patient opted into 22q11.2 deletion syndrome screening as part of the expanded NIPT option.     Carrier screening  Risk assessment for certain autosomal recessive and x-linked conditions. These conditions are generally infantile- or childhood-onset conditions.   Can be done any time during the pregnancy or prior to pregnancy.  Can screen for over 500 different genetic conditions.  Is not intended to diagnosis a condition in the carrier parent.    Even with negative results, a residual risk for screened conditions remains.     We discussed the following ultrasound options:    Nuchal translucency (NT) ultrasound  Ultrasound between 34v4l-23m3v that includes nuchal translucency measurement and nasal bone assessments  Nuchal translucency refers to the space at the back of the neck where fluid builds up. This is a normal finding and there is only concern if there is more fluid than expected  Nasal bone refers to the small bone in  the nose. There is concern for conditions like Down syndrome if the bone cannot be seen at all  This ultrasound can be done as part of first trimester screening, at the same time as another screen (NIPT), at the same time as a CVS, or if the patients does not want serum screening.  Markers on ultrasound detects about 70% of pregnancies with aneuploidy  Increased NT measurements can also increase the risk for a birth defect, like a heart defect    Comprehensive level II ultrasound (Fetal Anatomy Ultrasound)  Ultrasound done between 18-20 weeks gestation  Screens for major birth defects and markers for aneuploidy (like trisomy 21 and trisomy 18)  Includes assessment of the fetal growth, internal organs, placenta, and amniotic fluid    We discussed the following diagnostic options:     Chorionic villus sampling (CVS)  Invasive diagnostic procedure done between 10w0d and 13w6d  The procedure collects a small sample from the placenta for the purpose of chromosomal testing and/or other genetic testing  Diagnostic result; more than 99% sensitivity for fetal chromosome abnormalities  Cannot screen for open neural tube defects, maternal serum AFP after 15 weeks is recommended    Amniocentesis  Invasive diagnostic procedure done after 15 weeks gestation  The procedure collects a small sample of amniotic fluid for the purpose of chromosomal testing and/or other genetic testing  Diagnostic result; more than 99% sensitivity for fetal chromosome abnormalities  Testing for AFP in the amniotic fluid can test for open neural tube defects      It was a pleasure to be involved with Marylin connor care. Face-to-face time of the meeting was 45 minutes.    Chastity Ceja GC, MS, C  Board Certified and [place] Licensed Genetic Counselor   [facility]  Maternal Fetal Medicine  Office: [phone]  [facility]: [phone]   Fax: [phone]  [facility]

## 2023-10-16 ENCOUNTER — TELEPHONE (OUTPATIENT)
Dept: MATERNAL FETAL MEDICINE | Facility: CLINIC | Age: 33
End: 2023-10-16
Payer: MEDICAID

## 2023-10-16 LAB — SCANNED LAB RESULT: NORMAL

## 2023-10-16 NOTE — TELEPHONE ENCOUNTER
October 16, 2023    I spoke with Marylin regarding her NIPT results.     Results indicate NO ANEUPLOIDY DETECTED for chromosomes 21, 18, 13, or the sex chromosomes (XY). I disclosed predicted fetal sex, per her request. Results were also negative for 22q11.2 deletion syndrome.     This puts her current pregnancy at low risk for Down syndrome, trisomy 18, trisomy 13 and sex chromosome abnormalities. This test is reported to have the following sensitivities: Down syndrome- >99.9%, trisomy 18- >99.9%, and trisomy 13- >99.9%. Although these results are reassuring, this does not replace a standard chromosome analysis from a chorionic villus sampling or amniocentesis.     MSAFP is the appropriate second trimester screening test for open neural tube defects; the maternal quad screen is not recommended.    Her results are available in her Epic chart for her primary OB to review.    Kandis Calvillo MS, [facility]  Licensed Genetic Counselor  [facility]  Pager: [phone]  Office: [phone]

## 2023-11-21 ENCOUNTER — HOSPITAL ENCOUNTER (OUTPATIENT)
Dept: ULTRASOUND IMAGING | Facility: CLINIC | Age: 33
Discharge: HOME | End: 2023-11-21
Attending: STUDENT IN AN ORGANIZED HEALTH CARE EDUCATION/TRAINING PROGRAM
Payer: MEDICAID

## 2023-11-21 ENCOUNTER — OFFICE VISIT (OUTPATIENT)
Dept: MATERNAL FETAL MEDICINE | Facility: CLINIC | Age: 33
End: 2023-11-21
Attending: STUDENT IN AN ORGANIZED HEALTH CARE EDUCATION/TRAINING PROGRAM
Payer: MEDICAID

## 2023-11-21 DIAGNOSIS — O24.410 DIET CONTROLLED GESTATIONAL DIABETES MELLITUS (GDM), ANTEPARTUM: Primary | ICD-10-CM

## 2023-11-21 DIAGNOSIS — Z87.59 HISTORY OF PRE-ECLAMPSIA: ICD-10-CM

## 2023-11-21 DIAGNOSIS — Z36.89 ENCOUNTER FOR FETAL ANATOMIC SURVEY: ICD-10-CM

## 2023-11-21 PROCEDURE — 76811 OB US DETAILED SNGL FETUS: CPT | Mod: 26 | Performed by: STUDENT IN AN ORGANIZED HEALTH CARE EDUCATION/TRAINING PROGRAM

## 2023-11-21 PROCEDURE — 99213 OFFICE O/P EST LOW 20 MIN: CPT | Mod: 25 | Performed by: STUDENT IN AN ORGANIZED HEALTH CARE EDUCATION/TRAINING PROGRAM

## 2023-11-21 PROCEDURE — 76811 OB US DETAILED SNGL FETUS: CPT

## 2023-11-21 NOTE — PROGRESS NOTES
Please see the full imaging report from the ViewPoint program under the imaging tab.    Crystal Nolan MD  Maternal Fetal Medicine

## 2024-01-03 ENCOUNTER — OFFICE VISIT (OUTPATIENT)
Dept: CARDIOLOGY | Facility: CLINIC | Age: 34
End: 2024-01-03

## 2024-01-03 ENCOUNTER — HOSPITAL ENCOUNTER (OUTPATIENT)
Dept: CARDIOLOGY | Facility: CLINIC | Age: 34
Discharge: HOME | End: 2024-01-03
Attending: STUDENT IN AN ORGANIZED HEALTH CARE EDUCATION/TRAINING PROGRAM | Admitting: STUDENT IN AN ORGANIZED HEALTH CARE EDUCATION/TRAINING PROGRAM
Payer: COMMERCIAL

## 2024-01-03 DIAGNOSIS — O24.410 DIET CONTROLLED GESTATIONAL DIABETES MELLITUS (GDM), ANTEPARTUM: Primary | ICD-10-CM

## 2024-01-03 DIAGNOSIS — O24.410 DIET CONTROLLED GESTATIONAL DIABETES MELLITUS (GDM), ANTEPARTUM: ICD-10-CM

## 2024-01-03 PROCEDURE — 99204 OFFICE O/P NEW MOD 45 MIN: CPT | Mod: 25 | Performed by: PEDIATRICS

## 2024-01-03 PROCEDURE — 76825 ECHO EXAM OF FETAL HEART: CPT | Mod: 26 | Performed by: PEDIATRICS

## 2024-01-03 PROCEDURE — 93325 DOPPLER ECHO COLOR FLOW MAPG: CPT | Mod: 26 | Performed by: PEDIATRICS

## 2024-01-03 PROCEDURE — 93325 DOPPLER ECHO COLOR FLOW MAPG: CPT

## 2024-01-03 PROCEDURE — 76827 ECHO EXAM OF FETAL HEART: CPT | Mod: 26 | Performed by: PEDIATRICS

## 2024-01-09 NOTE — PROGRESS NOTES
Fetal Cardiology Consultation    Patient:  Marylin Mejía MRN:  [id number]   YOB: 1990 Age:  33 year old   Date of Visit:  1/3/2024 PCP:  No Ref-Primary, Physician   KEN: 4/19/2024, by Ultrasound EGA: 24w5d weeks     Dear Dr. Nolan:    I had the pleasure of seeing Marylin Mejía at the [facility] in [place] on 1/3/2024 in consultation for fetal echocardiography results. Today's visit was facilitated with an . As you know, she is a 33 year old female with pregestational diabetes.    The fetal echocardiogram was normal. Normal fetal cardiac anatomy. Normal right and left ventricular size and function without hypertrophy. No evidence of diastolic dysfunction. No pericardial effusion. No arrhythmia.     I reviewed and interpreted the fetal echocardiogram today. I discussed the normal results with Ms. Uriel Mejía with an . While these results are normal, it is important to note that fetal echocardiography cannot exclude small atrial or ventricular septal defects, persistent ductus arteriosus, mild coarctation of the aorta, partial anomalous pulmonary venous return, minor anatomic valve anomalies, or coronary artery anomalies.     Thank you for allowing me to participate in Ms. Uriel Mejía's care. Please don't hesitate to contact me or the Fetal Cardiology team at [facility] with any questions or concerns.     I spent a total of 45 minutes on the date of the encounter doing chart review, patient history, documentation, counseling, and coordinating care.    Arturo Turner MD  Pediatric Cardiology  [facility]  Phone [phone]    Review of the result(s) of each unique test - fetal echocardiogram

## 2024-01-30 ENCOUNTER — HOSPITAL ENCOUNTER (OUTPATIENT)
Dept: ULTRASOUND IMAGING | Facility: CLINIC | Age: 34
Discharge: HOME | End: 2024-01-30
Attending: OBSTETRICS & GYNECOLOGY
Payer: COMMERCIAL

## 2024-01-30 ENCOUNTER — OFFICE VISIT (OUTPATIENT)
Dept: MATERNAL FETAL MEDICINE | Facility: CLINIC | Age: 34
End: 2024-01-30
Attending: OBSTETRICS & GYNECOLOGY
Payer: COMMERCIAL

## 2024-01-30 DIAGNOSIS — O24.410 DIET CONTROLLED GESTATIONAL DIABETES MELLITUS (GDM), ANTEPARTUM: ICD-10-CM

## 2024-01-30 DIAGNOSIS — O24.410 DIET CONTROLLED GESTATIONAL DIABETES MELLITUS (GDM), ANTEPARTUM: Primary | ICD-10-CM

## 2024-01-30 PROCEDURE — 76816 OB US FOLLOW-UP PER FETUS: CPT

## 2024-01-30 PROCEDURE — 76816 OB US FOLLOW-UP PER FETUS: CPT | Mod: 26 | Performed by: OBSTETRICS & GYNECOLOGY

## 2024-01-30 NOTE — NURSING NOTE
via ipad. Patient reports positive fetal movement, denies leaking of fluid, or bleeding. SBAR given to ELIU BURGOS, see their note in Epic.

## 2024-01-30 NOTE — PROGRESS NOTES
"Please see \"Imaging\" tab under \"Chart Review\" for details of today's US at the [facility].    Julio Tipton MD  Maternal-Fetal Medicine    "

## 2024-02-27 ENCOUNTER — HOSPITAL ENCOUNTER (OUTPATIENT)
Dept: ULTRASOUND IMAGING | Facility: CLINIC | Age: 34
Discharge: HOME | End: 2024-02-27
Attending: OBSTETRICS & GYNECOLOGY
Payer: COMMERCIAL

## 2024-02-27 ENCOUNTER — OFFICE VISIT (OUTPATIENT)
Dept: MATERNAL FETAL MEDICINE | Facility: CLINIC | Age: 34
End: 2024-02-27
Attending: OBSTETRICS & GYNECOLOGY
Payer: COMMERCIAL

## 2024-02-27 DIAGNOSIS — O24.410 DIET CONTROLLED GESTATIONAL DIABETES MELLITUS (GDM), ANTEPARTUM: Primary | ICD-10-CM

## 2024-02-27 DIAGNOSIS — O24.410 DIET CONTROLLED GESTATIONAL DIABETES MELLITUS (GDM), ANTEPARTUM: ICD-10-CM

## 2024-02-27 PROCEDURE — 76816 OB US FOLLOW-UP PER FETUS: CPT | Mod: 26 | Performed by: OBSTETRICS & GYNECOLOGY

## 2024-02-27 PROCEDURE — 76819 FETAL BIOPHYS PROFIL W/O NST: CPT

## 2024-02-27 PROCEDURE — 76819 FETAL BIOPHYS PROFIL W/O NST: CPT | Mod: 26 | Performed by: OBSTETRICS & GYNECOLOGY

## 2024-02-27 NOTE — NURSING NOTE
Patient reports positive fetal movement, no pain, denies contractions, leaking of fluid, or bleeding.  Reports blood sugar values fasting within range and 2 hr post prandial within range.  Patient denies headache, visual changes, nausea/vomiting, epigastric pain related to preeclampsia.  Education provided to patient on ultrasound.  SBAR given to ELIU BURGOS, see their note in Epic.

## 2024-03-05 ENCOUNTER — HOSPITAL ENCOUNTER (OUTPATIENT)
Facility: CLINIC | Age: 34
Discharge: HOME | End: 2024-03-05
Attending: ADVANCED PRACTICE MIDWIFE | Admitting: ADVANCED PRACTICE MIDWIFE
Payer: COMMERCIAL

## 2024-03-05 ENCOUNTER — HOSPITAL ENCOUNTER (OUTPATIENT)
Facility: CLINIC | Age: 34
End: 2024-03-05
Admitting: ADVANCED PRACTICE MIDWIFE
Payer: COMMERCIAL

## 2024-03-05 VITALS — DIASTOLIC BLOOD PRESSURE: 65 MMHG | SYSTOLIC BLOOD PRESSURE: 114 MMHG | TEMPERATURE: 99 F

## 2024-03-05 PROBLEM — O60.00 PRETERM LABOR: Status: ACTIVE | Noted: 2024-03-05

## 2024-03-05 LAB
ALBUMIN UR-MCNC: NEGATIVE MG/DL
APPEARANCE UR: CLEAR
BACTERIA #/AREA URNS HPF: ABNORMAL /HPF
BILIRUB UR QL STRIP: NEGATIVE
CLUE CELLS: NORMAL
COLOR UR AUTO: ABNORMAL
GLUCOSE UR STRIP-MCNC: NEGATIVE MG/DL
HGB UR QL STRIP: ABNORMAL
KETONES UR STRIP-MCNC: NEGATIVE MG/DL
LEUKOCYTE ESTERASE UR QL STRIP: NEGATIVE
NITRATE UR QL: NEGATIVE
PH UR STRIP: 6 [PH] (ref 5–7)
RBC URINE: 0 /HPF
SP GR UR STRIP: 1.01 (ref 1–1.03)
SQUAMOUS EPITHELIAL: 7 /HPF
TRANSITIONAL EPI: <1 /HPF
TRICHOMONAS, WET PREP: NORMAL
UROBILINOGEN UR STRIP-MCNC: NORMAL MG/DL
WBC URINE: 3 /HPF
WBC'S/HIGH POWER FIELD, WET PREP: NORMAL
YEAST, WET PREP: NORMAL

## 2024-03-05 PROCEDURE — G0463 HOSPITAL OUTPT CLINIC VISIT: HCPCS

## 2024-03-05 PROCEDURE — 81001 URINALYSIS AUTO W/SCOPE: CPT | Performed by: ADVANCED PRACTICE MIDWIFE

## 2024-03-05 PROCEDURE — 99214 OFFICE O/P EST MOD 30 MIN: CPT | Mod: 25 | Performed by: ADVANCED PRACTICE MIDWIFE

## 2024-03-05 PROCEDURE — 87491 CHLMYD TRACH DNA AMP PROBE: CPT | Performed by: ADVANCED PRACTICE MIDWIFE

## 2024-03-05 PROCEDURE — 87210 SMEAR WET MOUNT SALINE/INK: CPT | Performed by: ADVANCED PRACTICE MIDWIFE

## 2024-03-05 PROCEDURE — 999N000104 HC STATISTIC NO CHARGE

## 2024-03-05 PROCEDURE — 87086 URINE CULTURE/COLONY COUNT: CPT | Performed by: ADVANCED PRACTICE MIDWIFE

## 2024-03-05 PROCEDURE — 59025 FETAL NON-STRESS TEST: CPT | Mod: 26 | Performed by: ADVANCED PRACTICE MIDWIFE

## 2024-03-05 PROCEDURE — 87591 N.GONORRHOEAE DNA AMP PROB: CPT | Performed by: ADVANCED PRACTICE MIDWIFE

## 2024-03-05 RX ORDER — METOCLOPRAMIDE HYDROCHLORIDE 5 MG/ML
10 INJECTION INTRAMUSCULAR; INTRAVENOUS EVERY 6 HOURS PRN
Status: DISCONTINUED | OUTPATIENT
Start: 2024-03-05 | End: 2024-03-05 | Stop reason: HOSPADM

## 2024-03-05 RX ORDER — PROCHLORPERAZINE 25 MG
25 SUPPOSITORY, RECTAL RECTAL EVERY 12 HOURS PRN
Status: DISCONTINUED | OUTPATIENT
Start: 2024-03-05 | End: 2024-03-05 | Stop reason: HOSPADM

## 2024-03-05 RX ORDER — METOCLOPRAMIDE 10 MG/1
10 TABLET ORAL EVERY 6 HOURS PRN
Status: DISCONTINUED | OUTPATIENT
Start: 2024-03-05 | End: 2024-03-05 | Stop reason: HOSPADM

## 2024-03-05 RX ORDER — PROCHLORPERAZINE MALEATE 10 MG
10 TABLET ORAL EVERY 6 HOURS PRN
Status: DISCONTINUED | OUTPATIENT
Start: 2024-03-05 | End: 2024-03-05 | Stop reason: HOSPADM

## 2024-03-05 RX ORDER — ONDANSETRON 4 MG/1
4 TABLET, ORALLY DISINTEGRATING ORAL EVERY 6 HOURS PRN
Status: DISCONTINUED | OUTPATIENT
Start: 2024-03-05 | End: 2024-03-05 | Stop reason: HOSPADM

## 2024-03-05 RX ORDER — ONDANSETRON 2 MG/ML
4 INJECTION INTRAMUSCULAR; INTRAVENOUS EVERY 6 HOURS PRN
Status: DISCONTINUED | OUTPATIENT
Start: 2024-03-05 | End: 2024-03-05 | Stop reason: HOSPADM

## 2024-03-05 ASSESSMENT — ACTIVITIES OF DAILY LIVING (ADL)
ADLS_ACUITY_SCORE: 35

## 2024-03-06 LAB
C TRACH DNA SPEC QL NAA+PROBE: NEGATIVE
N GONORRHOEA DNA SPEC QL NAA+PROBE: NEGATIVE

## 2024-03-06 NOTE — PLAN OF CARE
"Goal Outcome Evaluation:      Pt presented for triage c/o brownish and \"milky\" discharge and cramping. VSS and afebrile. Denies pre-e sypmtoms. Provider notified of pts arrival and presentation.     1915-Report given to Anitha SHAH   "

## 2024-03-06 NOTE — PROGRESS NOTES
HOSPITAL TRIAGE NOTE  ===================    CHIEF COMPLAINT  ========================  Marylin Mejía is a 33 year old patient presenting today at 33w4d for evaluation of uterine contractions and vaginal discharge.    No LMP recorded. Patient is pregnant.  Estimated Date of Delivery: 2024     HPI  ==================   Marylin presents to triage today reporting contractions and change to vaginal discharge. She reports that she has been having contractions for the last week intermittently.  Last night contractions were more regular, but she had not timed them. Was able to sleep over night and can talk through the contractions. Notes the pain as cramping and tightening with some pelvic pressure during contractions. Denies loss of fluid and change to fetal movement. Denies headache and RUQ pain.  Marylin also reports vaginal discharge that started yesterday as transparent and pink-tinged, reports today discharge is darker brown. Denies odor, vaginal itching or external vaginal pain, pain with urination.   Denies intercourse in past 24hrs.     Denies fever, cough, SOB or chest pain. Denies having contact with anyone who is Covid-19 positive. Pt has had Covid-19 Vaccinations.   Prenatal record and labs reviewed from [place], through [facility].    CONTRACTIONS: irregular  ABDOMINAL PAIN: cramping with contractions, denies other pain  FETAL MOVEMENT: active    VAGINAL BLEEDING: brown discharge, with wiping  RUPTURE OF MEMBRANES: no  PELVIC PAIN: none    PREGNANCY COMPLICATIONS:    - Gestational diabetes - diet controlled.    - Hepatitis B nonimmune status in pregnancy    - rubella nonimmune status in pregnancy  OTHER: history of preeclampisa    REVIEW OF SYSTEMS  =====================  C: NEGATIVE for fever, chills  I: NEGATIVE for worrisome rashes, moles or lesions  E: NEGATIVE for vision changes or irritation  R: NEGATIVE for significant cough or SOB  CV: NEGATIVE for chest pain, palpitations or  varicosities  GI: NEGATIVE for nausea, abdominal pain, heartburn, or change in bowel habits  : NEGATIVE for frequency, dysuria, or hematuria  M: NEGATIVE for significant arthralgias or myalgia  N: NEGATIVE for headache, weakness, dizziness or paresthesias  P: NEGATIVE for changes in mood or affect    PROBLEM LIST  ===============  Patient Active Problem List    Diagnosis Date Noted     labor 2024     Priority: Medium    Anemia, postpartum 2019     Priority: Medium     2018: Hgb 7.9  19 (admission): Hgb 12.3        (normal spontaneous vaginal delivery) 2019     Priority: Medium    Preeclampsia, third trimester 2019     Priority: Medium    Encounter for triage in pregnant patient 10/30/2018     Priority: Medium    Labor and delivery, indication for care 10/30/2018     Priority: Medium    Nausea and vomiting in pregnancy prior to 22 weeks gestation 2018     Priority: Medium    Supervision of normal first pregnancy, antepartum - [facility] pt. Call [phone] if questions 2018     Priority: Medium     Seen on 10/30/18 for light belly bump and decreased fetal movement. Reactive NST, movement noted by pt in triage.  O+, antibody -  Rubella immune (found rubella nonimmune result from [facility] 2018)  Hep negative          HISTORIES  ==============  ALLERGIES:    No Known Allergies  PAST MEDICAL HISTORY  Past Medical History:   Diagnosis Date    Heartburn during pregnancy     Preeclampsia 2019     SOCIAL HISTORY  Social History     Socioeconomic History    Marital status: Single     Spouse name: Not on file    Number of children: Not on file    Years of education: Not on file    Highest education level: Not on file   Occupational History    Not on file   Tobacco Use    Smoking status: Not on file    Smokeless tobacco: Never   Substance and Sexual Activity    Alcohol use: No    Drug use: No    Sexual activity: Not Currently   Other Topics Concern    Not on file   Social  History Narrative    ** Merged History Encounter **          Social Determinants of Health     Financial Resource Strain: Not on file   Food Insecurity: Not on file   Transportation Needs: Not on file   Physical Activity: Not on file   Stress: Not on file   Social Connections: Not on file   Interpersonal Safety: Not on file   Housing Stability: Not on file     PARTNER: present with daughter  FAMILY HISTORY  No family history on file.  OB HISTORY  OB History    Para Term  AB Living   2 1 1 0 0 1   SAB IAB Ectopic Multiple Live Births   0 0 0 0 1      # Outcome Date GA Lbr Arie/2nd Weight Sex Delivery Anes PTL Lv   2 Current            1 Term 19 39w2d 04:47 / 01:14 2.89 kg (6 lb 5.9 oz) F Vag-Spont EPI N SUSANNE      Complications: Dysfunctional Labor, Preeclampsia/Hypertension, Chorioamnionitis      Name: BETHANIE SOLIMAN,FEMALE-PHOEBE      Apgar1: 8  Apgar5: 9     Prenatal Labs:   Lab Results   Component Value Date    ABO O 2019    RH Pos 2019    AS Neg 2019    HEPBANG neg 2018    HGB 13.4 2023    HIAGAB nonreactive 2018    GLU1 97 2018     Rubella- nonimmune  ULTRASOUND(s) reviewed: last growth , EFW 27%ile, ANDRIA WNL, cephalic    EXAM  ============  /65 (BP Location: Right arm, Patient Position: Semi-Stiles's, Cuff Size: Adult Regular)   Temp 99  F (37.2  C) (Oral)   GENERAL APPEARANCE: healthy, alert and no distress  RESP: normal respiratory effort  CV: regular rates   ABDOMEN:  soft, nontender, no epigastric pain  SKIN: no suspicious lesions or rashes  NEURO: Denies headache, blurred vision, other vision changes  PSYCH: mentation appears normal. and affect normal/bright    CONTRACTIONS: irreg, 3-4 in 90 minutes  FETAL HEART TONES: continuous EFM- baseline 135 with moderate variability and positive accelerations. No decelerations.  NST: REACTIVE  EFW: 6lb 6oz on  by US    PELVIC EXAM: closed/ long/ Posterior/ average/ -3  PRESENTATION: VERTEX per  US on   BLOOD: no  DISCHARGE: no visible discharge on exam or with swab    ROM: no  ROMPLUS: not done    LABS: Wet prep and GC/ Chlamydia, UA/UC  Lab results reviewed- wet prep WNL, UA WNL, awaiting GC/CT and UC    DIAGNOSIS  ============  33w4d seen on the [facility] Triage for PTL, not in  in labor  NST: REACTIVE  Fetal Heart Tones:category one    Patient Active Problem List   Diagnosis    Nausea and vomiting in pregnancy prior to 22 weeks gestation    Supervision of normal first pregnancy, antepartum - [facility] pt. Call [phone] if questions    Encounter for triage in pregnant patient    Labor and delivery, indication for care    Preeclampsia, third trimester     (normal spontaneous vaginal delivery)    Anemia, postpartum     labor     PLAN  ============  - Wet prep and CT/GT collected.  Reviewed normal wet prep and UA results. Awaiting GC/CT and will call with abnormal results.   - Reassured patient that with contraction pattern and closed cervical exam,  labor is unlikely. Offered patient to stay for repeat cervical exam to evaluate for cervical change, patient declines and prefers discharge home. Recommended fluid intake and rest. Reviewed to return if contractions are closer together or increasing in intensity, with loss of fluid or vaginal bleeding, or decreased fetal movement.   - Letter provided for work accommodations per patient request  - Follow up at next prenatal visit tomorrow    I, Amina Drummond, am serving as a scribe; to document services personally performed by Leo Muñoz CNM based on data collection and the provider's statements to me. - ANTONELLA Jensen   The encounter was performed by me and scribed by the student. The scribed note accurately reflects my personal services and decisions made by me. ERNESTINA Lyn CNM

## 2024-03-06 NOTE — PROGRESS NOTES
Data: Patient assessed in the [facility] for  brown vaginal discharge . Cervix 0 cm dilated and 0% effaced. Fetal station -5. Membranes intact. Contractions are present but unnoticeable by patient.     Action: Presumed adequate fetal oxygenation documented. Discharge instructions reviewed. Patient instructed to report change in fetal movement, vaginal leaking of fluid or bleeding, abdominal pain, or any concerns related to the pregnancy to provider/clinic.      Response: Orders to discharge home per JOSEFINA Muñoz CNM. Patient verbalized understanding of education and agreement with plan. Discharged to home at 2045.

## 2024-03-06 NOTE — ED NOTES
Pt arrived to ED with complaint of contractions .  Pt reports 33 weeks pregnant.   Pt is having contractions Yes.   Pt feels urge to push No.   Pt reports water broke No.   Report was called and pt was transferred to L&D Yes.

## 2024-03-07 LAB — BACTERIA UR CULT: NORMAL

## 2024-03-28 ENCOUNTER — OFFICE VISIT (OUTPATIENT)
Dept: MATERNAL FETAL MEDICINE | Facility: CLINIC | Age: 34
End: 2024-03-28
Attending: OBSTETRICS & GYNECOLOGY
Payer: COMMERCIAL

## 2024-03-28 ENCOUNTER — HOSPITAL ENCOUNTER (OUTPATIENT)
Dept: ULTRASOUND IMAGING | Facility: CLINIC | Age: 34
Discharge: HOME | End: 2024-03-28
Attending: OBSTETRICS & GYNECOLOGY
Payer: COMMERCIAL

## 2024-03-28 DIAGNOSIS — O24.410 DIET CONTROLLED GESTATIONAL DIABETES MELLITUS (GDM), ANTEPARTUM: Primary | ICD-10-CM

## 2024-03-28 DIAGNOSIS — O24.410 DIET CONTROLLED GESTATIONAL DIABETES MELLITUS (GDM), ANTEPARTUM: ICD-10-CM

## 2024-03-28 PROCEDURE — 76816 OB US FOLLOW-UP PER FETUS: CPT

## 2024-03-28 PROCEDURE — 99213 OFFICE O/P EST LOW 20 MIN: CPT | Mod: 25 | Performed by: OBSTETRICS & GYNECOLOGY

## 2024-03-28 PROCEDURE — 76816 OB US FOLLOW-UP PER FETUS: CPT | Mod: 26 | Performed by: OBSTETRICS & GYNECOLOGY

## 2024-03-28 NOTE — NURSING NOTE
Patient reports positive fetal movement, no pain, no contractions, leaking of fluid, or bleeding.  Reports blood sugar values within range. Patient denies headache, visual changes, nausea/vomiting, epigastric pain related to preeclampsia.  Education provided to patient on growth ultrasound.  SBAR given to ELIU BURGOS, see their note in Epic.

## 2024-04-01 ENCOUNTER — VIRTUAL VISIT (OUTPATIENT)
Dept: INTERPRETER SERVICES | Facility: CLINIC | Age: 34
End: 2024-04-01

## 2024-04-01 ENCOUNTER — APPOINTMENT (OUTPATIENT)
Dept: INTERPRETER SERVICES | Facility: CLINIC | Age: 34
End: 2024-04-01
Payer: COMMERCIAL

## 2024-04-01 ENCOUNTER — HOSPITAL ENCOUNTER (OUTPATIENT)
Facility: CLINIC | Age: 34
Discharge: HOME | End: 2024-04-01
Attending: STUDENT IN AN ORGANIZED HEALTH CARE EDUCATION/TRAINING PROGRAM | Admitting: ADVANCED PRACTICE MIDWIFE
Payer: COMMERCIAL

## 2024-04-01 ENCOUNTER — VIRTUAL VISIT (OUTPATIENT)
Dept: INTERPRETER SERVICES | Facility: CLINIC | Age: 34
End: 2024-04-01
Payer: COMMERCIAL

## 2024-04-01 VITALS
BODY MASS INDEX: 29.75 KG/M2 | DIASTOLIC BLOOD PRESSURE: 71 MMHG | SYSTOLIC BLOOD PRESSURE: 112 MMHG | RESPIRATION RATE: 16 BRPM | TEMPERATURE: 98.1 F | WEIGHT: 128 LBS

## 2024-04-01 LAB
C TRACH DNA SPEC QL PROBE+SIG AMP: NEGATIVE
CLUE CELLS: ABNORMAL
N GONORRHOEA DNA SPEC QL NAA+PROBE: NEGATIVE
TRICHOMONAS, WET PREP: ABNORMAL
WBC'S/HIGH POWER FIELD, WET PREP: ABNORMAL
YEAST, WET PREP: ABNORMAL

## 2024-04-01 PROCEDURE — 59025 FETAL NON-STRESS TEST: CPT

## 2024-04-01 PROCEDURE — G0463 HOSPITAL OUTPT CLINIC VISIT: HCPCS | Mod: 25

## 2024-04-01 PROCEDURE — T1013 SIGN LANG/ORAL INTERPRETER: HCPCS | Mod: U4,TEL,95

## 2024-04-01 PROCEDURE — 87491 CHLMYD TRACH DNA AMP PROBE: CPT

## 2024-04-01 PROCEDURE — 87210 SMEAR WET MOUNT SALINE/INK: CPT

## 2024-04-01 RX ORDER — ASPIRIN 81 MG/1
81 TABLET ORAL DAILY
Status: ON HOLD | COMMUNITY
End: 2024-04-05

## 2024-04-01 RX ORDER — LIDOCAINE 40 MG/G
CREAM TOPICAL
Status: DISCONTINUED | OUTPATIENT
Start: 2024-04-01 | End: 2024-04-01 | Stop reason: HOSPADM

## 2024-04-01 ASSESSMENT — ACTIVITIES OF DAILY LIVING (ADL)
ADLS_ACUITY_SCORE: 18
ADLS_ACUITY_SCORE: 18
ADLS_ACUITY_SCORE: 35

## 2024-04-01 NOTE — DISCHARGE INSTRUCTIONS
Discharge Instructions for Undelivered Patients  [facility] [phone]    Diet:  Drink 8 to 12 glasses of water every day.  You may eat meals and snacks as before    Activity:  If you are experiencing  labor, rest the pelvic area. No sex. Do not stimulate breasts or nipples.  Rest often as needed.  Count fetal kicks every day. (See handout.)  Call your doctor if your baby is moving less than usual.    Medicines:  My care team has reviewed my medicines with me.  My care team has given me a list of my medicines.  My care team has prescribed a new medicine. They have either sent it home with me or ordered it from the pharmacy.    Call your provider if you notice:  Swelling in your face or increased swelling in your hands or legs.  Headaches that are not relieved by Tylenol (acetaminophen).  Changes in your vision (blurring; seeing spots or stars).  Nausea (sick to your stomach) and vomiting (throwing up).  Weight gain of 5 pounds per week.  Heartburn that doesn't go away.  Signs of bladder infection: pain when you urinate (use the toilet), needing to go more often or more urgently.  The bag of tam (membranes) breaks, or you notice leaking in your underwear.  Bright red blood in your underwear.  Abdominal (lower belly) or stomach pain.  For first baby: Contractions (tightenings) less than 5 minutes apart for one hour or more.  For Second (plus) baby: Contractions (tightenings) less than 10 minutes apart and getting stronger.  Increase or change in vaginal discharge (note the color and amount).    Follow up with your provider as scheduled.    Dieta:  Kelsey de 8 a 12 vasos de agua todos los días.  Puede comer comidas y refrigerios jackelyn antes.    Actividad:  Si tiene trabajo de parto prematuro, descanse el área pélvica. Sin sexo. No estimule los senos ni los pezones.   Descanse con frecuencia según sea necesario.   Cuente las patadas fetales todos los días. (Cherise folleto).   Llame a araujo médico si araujo bebé se  mueve menos de lo habitual.    Medicamentos:   Mi equipo de atención recio revisado mis medicamentos conmigo.   Mi equipo de atención me ha proporcionado carina lista de mis medicamentos.   Mi equipo de atención me ha recetado un nuevo medicamento. O me lo cardona enviado a casa o lo cardona pedido en la farmacia.    Llame a araujo proveedor si nota:   Hinchazón en la cliff o aumento de la hinchazón en las kaley o piernas.   Antoinette de laurie que no se alivian con Tylenol (acetaminofén).   Cambios en araujo visión (borrosa; sebastián manchas o estrellas).   Náuseas (malestar del estómago) y vómitos (devolver).   Aumento de peso de 5 libras por semana.   Acidez de estómago que no desaparece.   Signos de infección de la vejiga: dolor al orinar (usar el baño), necesidad de ir con más frecuencia o con mayor urgencia.   Se rompe la bolsa de piero (membranas), o notas goteras en tu ropa interior.   Sam susan brillante en tu ropa interior.   Dolor abdominal (parte baja del vientre) o de estómago.   Para el primer bebé: Contracciones (esfuerzos) con menos de 5 minutos de diferencia yamel carina hora o más.   Para el martín (más) bebé: Contracciones (contracciones) con menos de 10 minutos de diferencia y cada vez más maribell.   Aumento o cambio en el flujo vaginal (tenga en cuenta el color y la cantidad).    Vikki un seguimiento con araujo proveedor según lo programado.

## 2024-04-01 NOTE — PROVIDER NOTIFICATION
04/01/24 1245   Provider Notification   Provider Name/Title Dr. QIAN Segovia resident   Method of Notification At Bedside   Request Evaluate in Person   Notification Reason Labor Status     Provider in the room to assess patient. Utilizing Language services on the phone as .  Patient reports some VB when wiping 2 hours ago after going to the bathroom. Color brownish. No LOF, occasional cramping, but is comfortable. Reports positive FM.

## 2024-04-01 NOTE — PROGRESS NOTES
[facility]  OB Triage Note    Marylin Soliman MRN# [id number]   Age: 33 year old YOB: 1990     Date of Admission: 2024 12:41 PM  Date of service: 2024.       History of Present Illness (Resident / Clinician):   Marylin Soliman is a patient of Kacy Cisse from [facility].   Patient is a 33 year old  who is 37w3d pregnant with KEN  2024, by No LMP recorded. Patient is pregnant.     Patient reports she went to the bathroom at work, and noticed a small amount of bright red blood on toilet paper after wiping. She presented to L&D triage, where she noticed a small amount of dark red/brown blood on the toilet paper. No recent abdominal trauma or falls. No vaginal irritation. No intercourse.    Reports intermittent, irregular contractions, nothing strong. No fluid leakage. Fetal movement is normal.    The prenatal course has been significant for:  - GDMA1 with well controlled BG  - Hx of pre-E in prior pregnancy  - Hep-B NI  - Rubella NI    Prenatal labs   Lab Results   Component Value Date    ABO O 2019    RH Pos 2019    AS Neg 2019    HEPBANG neg 2018    CHPCRT Negative 2024    GCPCRT Negative 2024    RUBELLAABIGG non-immune 2018    HGB 13.4 2023    GBS negative 2019     GBS was collected on 3/29/24.         Obstetrical History:   She is a 33 year old   Her OB history:   OB History    Para Term  AB Living   2 1 1 0 0 1   SAB IAB Ectopic Multiple Live Births   0 0 0 0 1      # Outcome Date GA Lbr Arie/2nd Weight Sex Delivery Anes PTL Lv   2 Current            1 Term 19 39w2d 04:47 / 01:14 2.89 kg (6 lb 5.9 oz) F Vag-Spont EPI N SUSANNE      Complications: Dysfunctional Labor, Preeclampsia/Hypertension, Chorioamnionitis      Name: BETHANIE SOLIMAN,FEMALE-MARYLIN      Apgar1: 8  Apgar5: 9          Immunzations:     Most Recent Immunizations   Administered Date(s) Administered    COVID-19  "Monovalent 18+ (Moderna) 10/12/2021    MMR 01/31/2019          Past Medical History:     Past Medical History:   Diagnosis Date    Diabetes (H)     GDMA1    Heartburn during pregnancy     Preeclampsia 2019            Past Surgical History:   History reviewed. No pertinent surgical history.         Family History:   History reviewed. No pertinent family history.         Social History:   no tobacco use  no alcohol use  no illicit drug use         Medications:   No current facility-administered medications on file prior to encounter.  aspirin 81 MG EC tablet, Take 81 mg by mouth daily  D 5000 125 MCG (5000 UT) CAPS, Take 1 capsule by mouth daily  ondansetron (ZOFRAN) 4 MG tablet, Take 4 mg by mouth  Prenatal Vit-Fe Fumarate-FA (PRENATAL VITAMIN PO), Take 1 tablet by mouth daily  vitamin D3 (CHOLECALCIFEROL) 2000 units tablet, Take 1 tablet by mouth daily           Allergies:   Patient has no known allergies.         Physical Exam:   Vitals:   /71   Temp 98.1  F (36.7  C) (Oral)   Resp 16   0 lbs 0 oz  Estimated body mass index is 24.4 kg/m  as calculated from the following:    Height as of 1/28/19: 1.397 m (4' 7\").    Weight as of 9/27/23: 47.6 kg (105 lb).    GEN: Awake, alert in no apparent distress   HEENT: grossly normal  NECK: no lymphadenopathy or thryoidomegaly  RESPIRATORY: clear to auscultation bilaterally, no increased work of breathing  BACK:  no costovertebral angle tenderness   CARDIOVASCULAR: RRR, no murmur  ABDOMEN: gravid,  vertex by Leopold's  PELVIC:  no fluid noted, no blood noted.  Presenting part: cephalic.  Cervix: 1/20/-2  EXT:  no edema or calf tenderness    Electronic Fetal Monitoring:  O: Baseline rate 130 normal  Variability moderate  Accelerations present  Decelerations not present    Assessment: Category I EFM interpretation suggests absence of concern for fetal metabolic acidemia at this time due to accelerations present, decelerations absent, heart rate: normal baseline, and " variability: moderate    Uterine Activity irregular.    Strip reviewed on unit    NST interpretation:  Ordered by  Brandi Andrews MD  Start time: 12:20   Stop time 12:40  Baseline rate 130 normal  Accelerations present  Decelerations not present  Interpretation: reactive      Assessment and Plan:   Assessment:   Marylin Mejía is a 33 year old  at 37w3d here for vaginal bleeding. Bleeding now stopped, old blood present at external os on exam. Patient overall feeling well, no hx of abdominal trauma or falls, no vaginal irritation.   Patient Active Problem List   Diagnosis    Nausea and vomiting in pregnancy prior to 22 weeks gestation    Supervision of normal first pregnancy, antepartum - [facility] pt. Call [phone] if questions    Encounter for triage in pregnant patient    Labor and delivery, indication for care    Preeclampsia, third trimester     (normal spontaneous vaginal delivery)    Anemia, postpartum     labor         Plan:   # Vaginal bleeding  Small amount of bleeding on tissue paper after wiping, now resolved. Cervical exam reveals small amount of old blood at external os. Reassuringly NST shows reactive strip, abdominal exam is benign and no hx of abdominal trauma. Wet prep negative, G/C swab pending. Patient not actively thuy and her cervical exam is /2, no concern for labor at this time. Discharge precautions given, patient has follow up appointment scheduled with Kacy Cisse 4/3/24.         Brandi Andrews MD    I agree with the PFSH and ROS as completed by the resident, except for changes made by me.  The note accurately reflects my personal services and decisions made by me.   - ERNESTINA Lyn CNM

## 2024-04-01 NOTE — PROVIDER NOTIFICATION
04/01/24 1320   Uterine Activity Assessment   Method palpation;per patient report;external tocotransducer   Contraction Pattern irregular  (patienr reports only sometimes feeling cramping a little bit per .)   Contraction Frequency (Minutes) 4-7   Contraction Duration (seconds)    Contraction Intensity mild by palpation   Uterine Resting Tone soft by palpation

## 2024-04-01 NOTE — PROVIDER NOTIFICATION
04/01/24 1320   Fetal Assessment   Fetal Movement active   Fetal HR Assessment Method external US   Fetal HR (beats/min) 140   Fetal HR Baseline normal range   Fetal HR Variability moderate (amplitude range 6 to 25 bpm)   Fetal HR Accelerations increase 15 bpm above baseline lasting 15 seconds   Fetal HR Decelerations absent   RN Strip Review RN Reviewed Strip q 5   Intrauterine Corrective Measures Provider notified   NST Medical Indication Bleeding   NST Start Time 1220   NST Stop Time 1250   NST Extended Time No   Non-stress Test Interpretation Reactive  (EFM strip reviewed with Leo Muñoz CNM, APRN)

## 2024-04-01 NOTE — PROVIDER NOTIFICATION
04/01/24 1350   Provider Notification   Provider Name/Title Dr. Andrews   Method of Notification At Bedside   Request Evaluate in Person   Notification Reason Lab/Diagnostic Study     Swabs sent for wet prep and Gonorrhea/chlamydia. Wet prep negative.  Discharge instructions given with Dr. Andrews and this RN using  over the phone. Patient will follow up at next clinic appointment. Signs and symptoms of labor and other concerns explained as reasons to come back to [facility]place or call provider.

## 2024-04-04 ENCOUNTER — HOSPITAL ENCOUNTER (INPATIENT)
Facility: CLINIC | Age: 34
LOS: 1 days | Discharge: HOME HEALTH | End: 2024-04-06
Attending: STUDENT IN AN ORGANIZED HEALTH CARE EDUCATION/TRAINING PROGRAM | Admitting: PEDIATRICS
Payer: COMMERCIAL

## 2024-04-04 DIAGNOSIS — D62 ANEMIA DUE TO BLOOD LOSS, ACUTE: ICD-10-CM

## 2024-04-04 RX ORDER — METOCLOPRAMIDE 10 MG/1
10 TABLET ORAL EVERY 6 HOURS PRN
Status: DISCONTINUED | OUTPATIENT
Start: 2024-04-04 | End: 2024-04-05 | Stop reason: HOSPADM

## 2024-04-04 RX ORDER — ONDANSETRON 4 MG/1
4 TABLET, ORALLY DISINTEGRATING ORAL EVERY 6 HOURS PRN
Status: DISCONTINUED | OUTPATIENT
Start: 2024-04-04 | End: 2024-04-05 | Stop reason: HOSPADM

## 2024-04-04 RX ORDER — ONDANSETRON 2 MG/ML
4 INJECTION INTRAMUSCULAR; INTRAVENOUS EVERY 6 HOURS PRN
Status: DISCONTINUED | OUTPATIENT
Start: 2024-04-04 | End: 2024-04-05 | Stop reason: HOSPADM

## 2024-04-04 RX ORDER — METOCLOPRAMIDE HYDROCHLORIDE 5 MG/ML
10 INJECTION INTRAMUSCULAR; INTRAVENOUS EVERY 6 HOURS PRN
Status: DISCONTINUED | OUTPATIENT
Start: 2024-04-04 | End: 2024-04-05 | Stop reason: HOSPADM

## 2024-04-04 RX ORDER — PROCHLORPERAZINE 25 MG
25 SUPPOSITORY, RECTAL RECTAL EVERY 12 HOURS PRN
Status: DISCONTINUED | OUTPATIENT
Start: 2024-04-04 | End: 2024-04-05 | Stop reason: HOSPADM

## 2024-04-04 RX ORDER — PROCHLORPERAZINE MALEATE 10 MG
10 TABLET ORAL EVERY 6 HOURS PRN
Status: DISCONTINUED | OUTPATIENT
Start: 2024-04-04 | End: 2024-04-05 | Stop reason: HOSPADM

## 2024-04-05 ENCOUNTER — ANESTHESIA EVENT (OUTPATIENT)
Dept: OBGYN | Facility: CLINIC | Age: 34
End: 2024-04-05
Payer: COMMERCIAL

## 2024-04-05 ENCOUNTER — ANESTHESIA (OUTPATIENT)
Dept: OBGYN | Facility: CLINIC | Age: 34
End: 2024-04-05
Payer: COMMERCIAL

## 2024-04-05 PROBLEM — E55.9 VITAMIN D DEFICIENCY: Status: ACTIVE | Noted: 2023-09-25

## 2024-04-05 PROBLEM — Z28.39 RUBELLA NON-IMMUNE STATUS, ANTEPARTUM: Status: ACTIVE | Noted: 2023-09-25

## 2024-04-05 PROBLEM — O14.93 PREECLAMPSIA, THIRD TRIMESTER: Status: RESOLVED | Noted: 2019-01-28 | Resolved: 2024-04-05

## 2024-04-05 PROBLEM — Z34.00 SUPERVISION OF NORMAL FIRST PREGNANCY, ANTEPARTUM: Status: RESOLVED | Noted: 2018-07-05 | Resolved: 2024-04-05

## 2024-04-05 PROBLEM — Z37.9 NORMAL LABOR: Status: ACTIVE | Noted: 2024-04-05

## 2024-04-05 PROBLEM — O09.899 RUBELLA NON-IMMUNE STATUS, ANTEPARTUM: Status: ACTIVE | Noted: 2023-09-25

## 2024-04-05 PROBLEM — O09.299 HX OF PRE-ECLAMPSIA, PRIOR PREGNANCY, CURRENTLY PREGNANT: Status: ACTIVE | Noted: 2024-04-05

## 2024-04-05 PROBLEM — O21.9 NAUSEA AND VOMITING IN PREGNANCY PRIOR TO 22 WEEKS GESTATION: Status: RESOLVED | Noted: 2018-07-05 | Resolved: 2024-04-05

## 2024-04-05 LAB
ABO/RH(D): NORMAL
ANTIBODY SCREEN: NEGATIVE
B-OH-BUTYR SERPL-SCNC: 0.5 MMOL/L
GLUCOSE BLDC GLUCOMTR-MCNC: 79 MG/DL (ref 70–99)
GLUCOSE BLDC GLUCOMTR-MCNC: 85 MG/DL (ref 70–99)
GLUCOSE BLDC GLUCOMTR-MCNC: 93 MG/DL (ref 70–99)
GLUCOSE BLDC GLUCOMTR-MCNC: 97 MG/DL (ref 70–99)
GLUCOSE BLDC GLUCOMTR-MCNC: 98 MG/DL (ref 70–99)
HGB BLD-MCNC: 13.7 G/DL (ref 11.7–15.7)
PLATELET # BLD AUTO: 171 10E3/UL (ref 150–450)
SPECIMEN EXPIRATION DATE: NORMAL
T PALLIDUM AB SER QL: NONREACTIVE

## 2024-04-05 PROCEDURE — 120N000002 HC R&B MED SURG/OB UMMC

## 2024-04-05 PROCEDURE — 250N000009 HC RX 250

## 2024-04-05 PROCEDURE — 258N000003 HC RX IP 258 OP 636

## 2024-04-05 PROCEDURE — 0HQ9XZZ REPAIR PERINEUM SKIN, EXTERNAL APPROACH: ICD-10-PCS | Performed by: STUDENT IN AN ORGANIZED HEALTH CARE EDUCATION/TRAINING PROGRAM

## 2024-04-05 PROCEDURE — 00HU33Z INSERTION OF INFUSION DEVICE INTO SPINAL CANAL, PERCUTANEOUS APPROACH: ICD-10-PCS | Performed by: ANESTHESIOLOGY

## 2024-04-05 PROCEDURE — 722N000001 HC LABOR CARE VAGINAL DELIVERY SINGLE

## 2024-04-05 PROCEDURE — 59400 OBSTETRICAL CARE: CPT | Performed by: ANESTHESIOLOGY

## 2024-04-05 PROCEDURE — 10907ZC DRAINAGE OF AMNIOTIC FLUID, THERAPEUTIC FROM PRODUCTS OF CONCEPTION, VIA NATURAL OR ARTIFICIAL OPENING: ICD-10-PCS | Performed by: STUDENT IN AN ORGANIZED HEALTH CARE EDUCATION/TRAINING PROGRAM

## 2024-04-05 PROCEDURE — 82010 KETONE BODYS QUAN: CPT

## 2024-04-05 PROCEDURE — 258N000003 HC RX IP 258 OP 636: Performed by: ANESTHESIOLOGY

## 2024-04-05 PROCEDURE — 370N000003 HC ANESTHESIA WARD SERVICE: Performed by: STUDENT IN AN ORGANIZED HEALTH CARE EDUCATION/TRAINING PROGRAM

## 2024-04-05 PROCEDURE — 250N000011 HC RX IP 250 OP 636: Performed by: STUDENT IN AN ORGANIZED HEALTH CARE EDUCATION/TRAINING PROGRAM

## 2024-04-05 PROCEDURE — 85018 HEMOGLOBIN: CPT

## 2024-04-05 PROCEDURE — 3E0R3BZ INTRODUCTION OF ANESTHETIC AGENT INTO SPINAL CANAL, PERCUTANEOUS APPROACH: ICD-10-PCS | Performed by: ANESTHESIOLOGY

## 2024-04-05 PROCEDURE — 250N000011 HC RX IP 250 OP 636: Performed by: ANESTHESIOLOGY

## 2024-04-05 PROCEDURE — 59409 OBSTETRICAL CARE: CPT | Mod: GC

## 2024-04-05 PROCEDURE — 86900 BLOOD TYPING SEROLOGIC ABO: CPT

## 2024-04-05 PROCEDURE — G0463 HOSPITAL OUTPT CLINIC VISIT: HCPCS

## 2024-04-05 PROCEDURE — 250N000013 HC RX MED GY IP 250 OP 250 PS 637: Performed by: FAMILY MEDICINE

## 2024-04-05 PROCEDURE — 85049 AUTOMATED PLATELET COUNT: CPT

## 2024-04-05 PROCEDURE — 86780 TREPONEMA PALLIDUM: CPT

## 2024-04-05 RX ORDER — MISOPROSTOL 200 UG/1
400 TABLET ORAL
Status: DISCONTINUED | OUTPATIENT
Start: 2024-04-05 | End: 2024-04-05

## 2024-04-05 RX ORDER — NALBUPHINE HYDROCHLORIDE 20 MG/ML
2.5-5 INJECTION, SOLUTION INTRAMUSCULAR; INTRAVENOUS; SUBCUTANEOUS EVERY 6 HOURS PRN
Status: DISCONTINUED | OUTPATIENT
Start: 2024-04-05 | End: 2024-04-05

## 2024-04-05 RX ORDER — NALOXONE HYDROCHLORIDE 0.4 MG/ML
0.4 INJECTION, SOLUTION INTRAMUSCULAR; INTRAVENOUS; SUBCUTANEOUS
Status: DISCONTINUED | OUTPATIENT
Start: 2024-04-05 | End: 2024-04-05

## 2024-04-05 RX ORDER — CARBOPROST TROMETHAMINE 250 UG/ML
250 INJECTION, SOLUTION INTRAMUSCULAR
Status: DISCONTINUED | OUTPATIENT
Start: 2024-04-05 | End: 2024-04-05

## 2024-04-05 RX ORDER — IBUPROFEN 800 MG/1
800 TABLET, FILM COATED ORAL
Status: DISCONTINUED | OUTPATIENT
Start: 2024-04-05 | End: 2024-04-05

## 2024-04-05 RX ORDER — PROCHLORPERAZINE MALEATE 10 MG
10 TABLET ORAL EVERY 6 HOURS PRN
Status: DISCONTINUED | OUTPATIENT
Start: 2024-04-05 | End: 2024-04-05

## 2024-04-05 RX ORDER — DOCUSATE SODIUM 100 MG/1
100 CAPSULE, LIQUID FILLED ORAL 2 TIMES DAILY
Qty: 100 CAPSULE | Refills: 0 | Status: SHIPPED | OUTPATIENT
Start: 2024-04-05

## 2024-04-05 RX ORDER — OXYTOCIN/0.9 % SODIUM CHLORIDE 30/500 ML
340 PLASTIC BAG, INJECTION (ML) INTRAVENOUS CONTINUOUS PRN
Status: DISCONTINUED | OUTPATIENT
Start: 2024-04-05 | End: 2024-04-06 | Stop reason: HOSPADM

## 2024-04-05 RX ORDER — CARBOPROST TROMETHAMINE 250 UG/ML
250 INJECTION, SOLUTION INTRAMUSCULAR
Status: DISCONTINUED | OUTPATIENT
Start: 2024-04-05 | End: 2024-04-06 | Stop reason: HOSPADM

## 2024-04-05 RX ORDER — FENTANYL CITRATE-0.9 % NACL/PF 10 MCG/ML
100 PLASTIC BAG, INJECTION (ML) INTRAVENOUS EVERY 5 MIN PRN
Status: DISCONTINUED | OUTPATIENT
Start: 2024-04-05 | End: 2024-04-05

## 2024-04-05 RX ORDER — KETOROLAC TROMETHAMINE 30 MG/ML
30 INJECTION, SOLUTION INTRAMUSCULAR; INTRAVENOUS
Status: DISCONTINUED | OUTPATIENT
Start: 2024-04-05 | End: 2024-04-05

## 2024-04-05 RX ORDER — MISOPROSTOL 200 UG/1
800 TABLET ORAL
Status: DISCONTINUED | OUTPATIENT
Start: 2024-04-05 | End: 2024-04-05

## 2024-04-05 RX ORDER — METOCLOPRAMIDE 10 MG/1
10 TABLET ORAL EVERY 6 HOURS PRN
Status: DISCONTINUED | OUTPATIENT
Start: 2024-04-05 | End: 2024-04-05

## 2024-04-05 RX ORDER — IBUPROFEN 800 MG/1
800 TABLET, FILM COATED ORAL EVERY 6 HOURS PRN
Status: DISCONTINUED | OUTPATIENT
Start: 2024-04-05 | End: 2024-04-06 | Stop reason: HOSPADM

## 2024-04-05 RX ORDER — NALOXONE HYDROCHLORIDE 0.4 MG/ML
0.2 INJECTION, SOLUTION INTRAMUSCULAR; INTRAVENOUS; SUBCUTANEOUS
Status: DISCONTINUED | OUTPATIENT
Start: 2024-04-05 | End: 2024-04-05

## 2024-04-05 RX ORDER — FENTANYL/ROPIVACAINE/NS/PF 2MCG/ML-.1
PLASTIC BAG, INJECTION (ML) EPIDURAL
Status: DISCONTINUED | OUTPATIENT
Start: 2024-04-05 | End: 2024-04-05

## 2024-04-05 RX ORDER — MODIFIED LANOLIN
OINTMENT (GRAM) TOPICAL
Status: DISCONTINUED | OUTPATIENT
Start: 2024-04-05 | End: 2024-04-06 | Stop reason: HOSPADM

## 2024-04-05 RX ORDER — DEXTROSE MONOHYDRATE 25 G/50ML
25-50 INJECTION, SOLUTION INTRAVENOUS
Status: DISCONTINUED | OUTPATIENT
Start: 2024-04-05 | End: 2024-04-05

## 2024-04-05 RX ORDER — OXYTOCIN/0.9 % SODIUM CHLORIDE 30/500 ML
340 PLASTIC BAG, INJECTION (ML) INTRAVENOUS CONTINUOUS PRN
Status: DISCONTINUED | OUTPATIENT
Start: 2024-04-05 | End: 2024-04-05

## 2024-04-05 RX ORDER — NICOTINE POLACRILEX 4 MG
15-30 LOZENGE BUCCAL
Status: DISCONTINUED | OUTPATIENT
Start: 2024-04-05 | End: 2024-04-05

## 2024-04-05 RX ORDER — LIDOCAINE 40 MG/G
CREAM TOPICAL
Status: DISCONTINUED | OUTPATIENT
Start: 2024-04-05 | End: 2024-04-05

## 2024-04-05 RX ORDER — LOPERAMIDE HCL 2 MG
2 CAPSULE ORAL
Status: DISCONTINUED | OUTPATIENT
Start: 2024-04-05 | End: 2024-04-05

## 2024-04-05 RX ORDER — ONDANSETRON 2 MG/ML
4 INJECTION INTRAMUSCULAR; INTRAVENOUS EVERY 6 HOURS PRN
Status: DISCONTINUED | OUTPATIENT
Start: 2024-04-05 | End: 2024-04-05

## 2024-04-05 RX ORDER — SODIUM CHLORIDE, SODIUM LACTATE, POTASSIUM CHLORIDE, CALCIUM CHLORIDE 600; 310; 30; 20 MG/100ML; MG/100ML; MG/100ML; MG/100ML
INJECTION, SOLUTION INTRAVENOUS CONTINUOUS
Status: DISCONTINUED | OUTPATIENT
Start: 2024-04-05 | End: 2024-04-05

## 2024-04-05 RX ORDER — MISOPROSTOL 200 UG/1
800 TABLET ORAL
Status: DISCONTINUED | OUTPATIENT
Start: 2024-04-05 | End: 2024-04-06 | Stop reason: HOSPADM

## 2024-04-05 RX ORDER — DOCUSATE SODIUM 100 MG/1
100 CAPSULE, LIQUID FILLED ORAL DAILY
Status: DISCONTINUED | OUTPATIENT
Start: 2024-04-05 | End: 2024-04-06 | Stop reason: HOSPADM

## 2024-04-05 RX ORDER — OXYTOCIN/0.9 % SODIUM CHLORIDE 30/500 ML
100-340 PLASTIC BAG, INJECTION (ML) INTRAVENOUS CONTINUOUS PRN
Status: DISCONTINUED | OUTPATIENT
Start: 2024-04-05 | End: 2024-04-05

## 2024-04-05 RX ORDER — OXYTOCIN 10 [USP'U]/ML
10 INJECTION, SOLUTION INTRAMUSCULAR; INTRAVENOUS
Status: DISCONTINUED | OUTPATIENT
Start: 2024-04-05 | End: 2024-04-05

## 2024-04-05 RX ORDER — LOPERAMIDE HCL 2 MG
4 CAPSULE ORAL
Status: DISCONTINUED | OUTPATIENT
Start: 2024-04-05 | End: 2024-04-05

## 2024-04-05 RX ORDER — BISACODYL 10 MG
10 SUPPOSITORY, RECTAL RECTAL DAILY PRN
Status: DISCONTINUED | OUTPATIENT
Start: 2024-04-05 | End: 2024-04-06 | Stop reason: HOSPADM

## 2024-04-05 RX ORDER — OXYTOCIN 10 [USP'U]/ML
10 INJECTION, SOLUTION INTRAMUSCULAR; INTRAVENOUS
Status: DISCONTINUED | OUTPATIENT
Start: 2024-04-05 | End: 2024-04-06 | Stop reason: HOSPADM

## 2024-04-05 RX ORDER — LOPERAMIDE HCL 2 MG
4 CAPSULE ORAL
Status: DISCONTINUED | OUTPATIENT
Start: 2024-04-05 | End: 2024-04-06 | Stop reason: HOSPADM

## 2024-04-05 RX ORDER — TRANEXAMIC ACID 10 MG/ML
1 INJECTION, SOLUTION INTRAVENOUS EVERY 30 MIN PRN
Status: DISCONTINUED | OUTPATIENT
Start: 2024-04-05 | End: 2024-04-06 | Stop reason: HOSPADM

## 2024-04-05 RX ORDER — ACETAMINOPHEN 325 MG/1
650 TABLET ORAL EVERY 4 HOURS PRN
Status: DISCONTINUED | OUTPATIENT
Start: 2024-04-05 | End: 2024-04-06 | Stop reason: HOSPADM

## 2024-04-05 RX ORDER — TRANEXAMIC ACID 10 MG/ML
1 INJECTION, SOLUTION INTRAVENOUS EVERY 30 MIN PRN
Status: DISCONTINUED | OUTPATIENT
Start: 2024-04-05 | End: 2024-04-05

## 2024-04-05 RX ORDER — SODIUM CHLORIDE 9 MG/ML
INJECTION, SOLUTION INTRAVENOUS CONTINUOUS
Status: DISCONTINUED | OUTPATIENT
Start: 2024-04-05 | End: 2024-04-05

## 2024-04-05 RX ORDER — MISOPROSTOL 200 UG/1
400 TABLET ORAL
Status: DISCONTINUED | OUTPATIENT
Start: 2024-04-05 | End: 2024-04-06 | Stop reason: HOSPADM

## 2024-04-05 RX ORDER — PROCHLORPERAZINE 25 MG
25 SUPPOSITORY, RECTAL RECTAL EVERY 12 HOURS PRN
Status: DISCONTINUED | OUTPATIENT
Start: 2024-04-05 | End: 2024-04-05

## 2024-04-05 RX ORDER — METHYLERGONOVINE MALEATE 0.2 MG/ML
200 INJECTION INTRAVENOUS
Status: DISCONTINUED | OUTPATIENT
Start: 2024-04-05 | End: 2024-04-05

## 2024-04-05 RX ORDER — METHYLERGONOVINE MALEATE 0.2 MG/ML
200 INJECTION INTRAVENOUS
Status: DISCONTINUED | OUTPATIENT
Start: 2024-04-05 | End: 2024-04-06 | Stop reason: HOSPADM

## 2024-04-05 RX ORDER — METOCLOPRAMIDE HYDROCHLORIDE 5 MG/ML
10 INJECTION INTRAMUSCULAR; INTRAVENOUS EVERY 6 HOURS PRN
Status: DISCONTINUED | OUTPATIENT
Start: 2024-04-05 | End: 2024-04-05

## 2024-04-05 RX ORDER — IBUPROFEN 600 MG/1
600 TABLET, FILM COATED ORAL EVERY 6 HOURS PRN
Qty: 60 TABLET | Refills: 0 | Status: SHIPPED | OUTPATIENT
Start: 2024-04-05

## 2024-04-05 RX ORDER — LOPERAMIDE HCL 2 MG
2 CAPSULE ORAL
Status: DISCONTINUED | OUTPATIENT
Start: 2024-04-05 | End: 2024-04-06 | Stop reason: HOSPADM

## 2024-04-05 RX ORDER — HYDROCORTISONE 25 MG/G
CREAM TOPICAL 3 TIMES DAILY PRN
Status: DISCONTINUED | OUTPATIENT
Start: 2024-04-05 | End: 2024-04-06 | Stop reason: HOSPADM

## 2024-04-05 RX ORDER — ONDANSETRON 4 MG/1
4 TABLET, ORALLY DISINTEGRATING ORAL EVERY 6 HOURS PRN
Status: DISCONTINUED | OUTPATIENT
Start: 2024-04-05 | End: 2024-04-05

## 2024-04-05 RX ORDER — CITRIC ACID/SODIUM CITRATE 334-500MG
30 SOLUTION, ORAL ORAL
Status: DISCONTINUED | OUTPATIENT
Start: 2024-04-05 | End: 2024-04-05

## 2024-04-05 RX ADMIN — SODIUM CHLORIDE, POTASSIUM CHLORIDE, SODIUM LACTATE AND CALCIUM CHLORIDE 1000 ML: 600; 310; 30; 20 INJECTION, SOLUTION INTRAVENOUS at 02:10

## 2024-04-05 RX ADMIN — ACETAMINOPHEN 650 MG: 325 TABLET, FILM COATED ORAL at 21:21

## 2024-04-05 RX ADMIN — Medication: at 02:58

## 2024-04-05 RX ADMIN — LIDOCAINE HYDROCHLORIDE 20 ML: 10 INJECTION, SOLUTION EPIDURAL; INFILTRATION; INTRACAUDAL; PERINEURAL at 06:45

## 2024-04-05 RX ADMIN — IBUPROFEN 800 MG: 800 TABLET, FILM COATED ORAL at 18:05

## 2024-04-05 RX ADMIN — Medication 340 ML/HR: at 06:40

## 2024-04-05 RX ADMIN — DOCUSATE SODIUM 100 MG: 100 CAPSULE, LIQUID FILLED ORAL at 08:19

## 2024-04-05 RX ADMIN — SODIUM CHLORIDE, POTASSIUM CHLORIDE, SODIUM LACTATE AND CALCIUM CHLORIDE: 600; 310; 30; 20 INJECTION, SOLUTION INTRAVENOUS at 03:09

## 2024-04-05 RX ADMIN — BUPIVACAINE HYDROCHLORIDE 10 ML: 2.5 INJECTION, SOLUTION EPIDURAL; INFILTRATION; INTRACAUDAL at 02:52

## 2024-04-05 RX ADMIN — IBUPROFEN 800 MG: 800 TABLET, FILM COATED ORAL at 08:19

## 2024-04-05 ASSESSMENT — ACTIVITIES OF DAILY LIVING (ADL)
ADLS_ACUITY_SCORE: 18
ADLS_ACUITY_SCORE: 18
ADLS_ACUITY_SCORE: 35
ADLS_ACUITY_SCORE: 18
ADLS_ACUITY_SCORE: 35
ADLS_ACUITY_SCORE: 18
ADLS_ACUITY_SCORE: 18
ADLS_ACUITY_SCORE: 35
ADLS_ACUITY_SCORE: 35
ADLS_ACUITY_SCORE: 18
ADLS_ACUITY_SCORE: 18
ADLS_ACUITY_SCORE: 35
ADLS_ACUITY_SCORE: 35
ADLS_ACUITY_SCORE: 18
ADLS_ACUITY_SCORE: 35
ADLS_ACUITY_SCORE: 35
ADLS_ACUITY_SCORE: 38
ADLS_ACUITY_SCORE: 18

## 2024-04-05 NOTE — PROGRESS NOTES
Data: Marylin Mejía transferred to [id number] via wheelchair at 0935. Baby transferred via parent's arms.  Action: Receiving unit notified of transfer: Yes. Patient and family notified of room change. Belongings sent to receiving unit. Accompanied by Registered Nurse. Oriented patient to surroundings. Call light within reach. ID bands double-checked with receiving RN.  Response: Patient tolerated transfer and is stable.

## 2024-04-05 NOTE — PROGRESS NOTES
[facility]  Intrapartum Progress Note  2024 3:05 AM  Date of service: 2024.    SUBJECTIVE:    Doing well. Patient reports contractions every 4 minutes and denies vaginal bleeding or loss of fluids. Fetal movement is Normal.    OBJECTIVE:   Patient Vitals for the past 8 hrs:   BP Temp Temp src Resp SpO2   24 0340 -- -- -- -- 99 %   24 0320 104/65 -- -- -- 98 %   24 0315 115/53 -- -- -- --   24 0310 112/58 -- -- -- --   24 0305 112/61 -- -- -- --   24 0300 117/58 -- -- -- --   24 0255 -- -- -- -- 99 %   24 0252 113/58 -- -- -- --   24 0250 104/57 -- -- -- --   24 0248 118/77 -- -- -- --   24 0247 122/69 -- -- -- --   24 0245 117/61 -- -- -- --   24 0243 118/57 -- -- -- --   24 0241 (!) 154/69 -- -- -- --   24 0238 (!) 144/71 -- -- -- --   24 0236 137/76 -- -- -- --   24 0221 (!) 141/79 -- -- -- --   24 2337 129/79 98  F (36.7  C) Oral 18 --     Gen: no apparent distress, resting comfortably  Abd: Gravid.  EFW: 3400g    Sterile Vaginal Exam:  Membranes: intact  Cervix: /-   Consistency: soft  Presentation:Cephalic        ASSESSMENT and PLAN:  Marylin Mejía is a 33 year old  at 38w0d in active labor.   Patient Active Problem List   Diagnosis    Encounter for triage in pregnant patient    Labor and delivery, indication for care     labor    Rubella non-immune status, antepartum    Vitamin D deficiency    Hx of pre-eclampsia, prior pregnancy, currently pregnant    Normal labor       1.  Labor: spontaneous. active labor Progressing well.         Continue expectant management.  Plan to reassess in two hours.   2.  Fetal Heart Rate Tracing: category one  3.  GBS negative.  Antibiotics are not indicated.  4.  Pain Management: epidural      Pearl Pires MD

## 2024-04-05 NOTE — ANESTHESIA PREPROCEDURE EVALUATION
Anesthesia Pre-Procedure Evaluation    Patient: Marylin Mejía   MRN: [id number] : 1990        Procedure :           Past Medical History:   Diagnosis Date    Diabetes (H)     GDMA1    Heartburn during pregnancy     Preeclampsia 2019      No past surgical history on file.   No Known Allergies   Social History     Tobacco Use    Smoking status: Never    Smokeless tobacco: Never   Substance Use Topics    Alcohol use: No      Wt Readings from Last 1 Encounters:   24 58.1 kg (128 lb)        Anesthesia Evaluation   Pt has had prior anesthetic. Type: OB Labor Epidural.    No history of anesthetic complications       ROS/MED HX  ENT/Pulmonary:  - neg pulmonary ROS     Neurologic:       Cardiovascular:  - neg cardiovascular ROS     METS/Exercise Tolerance:     Hematologic:     (+)    no thrombocytopenia,         (-) anemia   Musculoskeletal:       GI/Hepatic:     (+) GERD,                   Renal/Genitourinary:       Endo:     (+)                  gestational diabetes and Diet- controlled,    Psychiatric/Substance Use:       Infectious Disease:       Malignancy:       Other:    at 38w0d here in spontaneous labor  Hx of pre-E         Physical Exam    Airway        Mallampati: IV   TM distance: > 3 FB   Neck ROM: full   Mouth opening: > 3 cm    Respiratory Devices and Support         Dental     Comment: Small chips, minor abnormalities        Cardiovascular   cardiovascular exam normal          Pulmonary   pulmonary exam normal                OUTSIDE LABS:  CBC:   Lab Results   Component Value Date    WBC 10.9 2023    WBC 20.3 (H) 2019    HGB 13.4 2023    HGB 7.9 (L) 2019    HCT 38.0 2023    HCT 23.5 (L) 2019     2023     (L) 2019     BMP:   Lab Results   Component Value Date     2023     2018    POTASSIUM 3.6 2023    POTASSIUM 3.1 (L) 2018    CHLORIDE 100 2023    CHLORIDE 101 2018    CO2 22  "09/27/2023    CO2 23 07/23/2018    BUN 5.9 (L) 09/27/2023    BUN 7 07/23/2018    CR 0.55 09/27/2023    CR 0.54 01/28/2019    GLC 95 09/27/2023    GLC 74 07/23/2018     COAGS: No results found for: \"PTT\", \"INR\", \"FIBR\"  POC:   Lab Results   Component Value Date    HCG Positive 09/27/2023     HEPATIC:   Lab Results   Component Value Date    ALBUMIN 3.7 07/23/2018    PROTTOTAL 8.0 07/23/2018    ALT 34 01/28/2019    AST 37 01/28/2019    ALKPHOS 50 07/23/2018    BILITOTAL 0.4 07/23/2018     OTHER:   Lab Results   Component Value Date    LACT 1.3 06/21/2018    BETSY 9.6 09/27/2023    LIPASE 124 07/23/2018       Anesthesia Plan    ASA Status:  2       Anesthesia Type: Epidural.              Consents    Anesthesia Plan(s) and associated risks, benefits, and realistic alternatives discussed. Questions answered and patient/representative(s) expressed understanding.     - Discussed:     - Discussed with:  Patient            Postoperative Care            Comments:               Leslie Goldberg, MD    I have reviewed the pertinent notes and labs in the chart from the past 30 days and (re)examined the patient.  Any updates or changes from those notes are reflected in this note.             # Drug Induced Platelet Defect: home medication list includes an antiplatelet medication      "

## 2024-04-05 NOTE — PLAN OF CARE
Pt states that she is no longer tolerating pain and requests epidural. IV bolus started. Anesthesia called to bedside. Written consent obtained by Dr Goldberg. Pt positioned and epidural placed. Pt reports adequate pain relief and resting. Reporting intermittent pressure, MD updated. Dr Pires bedside for SVE, 6cm. Pt reporting needing to use the restroom and wanting to get up to have a bowel movement. Explained to patient the feeling of intermittent pressure that feels as if you're having a bowel movement is most likely the fetal head descending into the pelvis. Pt still wanting to get up to restroom, reminded she has an epidural and will not be able to walk and she must remain in bed.

## 2024-04-05 NOTE — ANESTHESIA PROCEDURE NOTES
Epidural catheter Procedure Note    Pre-Procedure   Staff -        Anesthesiologist:  Aaron Lyn DO       Resident/Fellow: Goldberg, Leslie, MD       Performed By: resident       Location: OB       Procedure Start/Stop Times: 4/5/2024 2:52 AM and 4/5/2024 2:52 AM       Pre-Anesthestic Checklist: patient identified, IV checked, risks and benefits discussed, informed consent, monitors and equipment checked, pre-op evaluation, at physician/surgeon's request and post-op pain management  Timeout:       Correct Patient: Yes        Correct Procedure: Yes        Correct Site: Yes        Correct Position: Yes   Procedure Documentation  Procedure: epidural catheter       Diagnosis: labor analgesia       Patient Position: sitting       Patient Prep/Sterile Barriers: sterile gloves, mask, patient draped       Skin prep: Chloraprep       Local skin infiltrated with mL of 2% lidocaine.        Insertion Site: L2-3. (midline approach).       Technique: LORT saline        WILFRID at 4.5 cm.       Needle Type: [employer] needle       Needle Gauge: 17.        Needle Length (Inches): 3.5        Catheter: 18 G.          Catheter threaded easily.         5.5 cm epidural space.         Threaded 10 cm at skin.         # of attempts: 1 and  # of redirects:  0    Assessment/Narrative         Paresthesias: Resolved.       Test dose of 3 mL lidocaine 1.5% w/ 1:200,000 epinephrine at 02:40 CDT.         Test dose negative, 3 minutes after injection, for signs of intravascular, subdural, or intrathecal injection.       Insertion/Infusion Method: LORT saline       Aspiration negative for Heme or CSF via Epidural Catheter.    Medication(s) Administered   0.125% bupivacaine 5 mL + fentanyl 20 mcg + NS 5 mL (Epidural) (Mixture components: BUPivacaine HCl (PF) 0.25 % Soln, 5 mL; fentaNYL (PF) 100 MCG/2ML Soln, 20 mcg; sodium chloride 0.9 % Soln, 5 mL) - EPIDURAL   10 mL - 4/5/2024 2:52:00 AM  Medication Administration Time: 4/5/2024 2:52 AM    "  Comments:  6 ml given while seated, administered the remaining 4cc over the next 10 minutes.       FOR [facility] ONLY:   Pain Team Contact information: please page the Pain Team Via [employer]. Search \"Pain\". During daytime hours, please page the attending first. At night please page the resident first.      "

## 2024-04-05 NOTE — PROVIDER NOTIFICATION
04/04/24 8685   Provider Notification   Provider Name/Title Xiao BURGOS   Method of Notification Phone     Telephone SBAR. RN will do SVE and update provider.

## 2024-04-05 NOTE — H&P
[facility]  OB Admit Note    Marylin Soliman MRN# [id number]   Age: 33 year old YOB: 1990     Date of Admission: 2024 12:32 AM  Date of service: 2024.       History of Present Illness (Resident / Clinician):   Marylin Soliman is a patient of [facility].   Patient is a 33 year old  who is 38w0d pregnant with KEN  2024, by LMP that is consistent with 9wk6d US.    The [facility] for active [facility] management.    Reports regular contractions starting at 1700, and occurring every four minutes. denies fluid leakage. denies bleeding per vagina. Fetal movement is .normal.    The prenatal course has been significant for:  - GDMA1 with well controlled BG  - Hx of pre-E in prior pregnancy  - Hep-B NI  - Rubella NI    Prenatal labs   Lab Results   Component Value Date    ABO O 2019    RH Pos 2019    AS Neg 2019    HEPBANG neg 2018    CHPCRT Negative 2024    GCPCRT Negative 2024    RUBELLAABIGG non-immune 2018    HGB 13.4 2023    GBS negative 2019       GBS was collected on 3/29 and is negative. .  Weight gain during pregnancy: adequete (111lb to 128lb).              Obstetrical History:   She is a 33 year old   Her OB history:   OB History    Para Term  AB Living   2 1 1 0 0 1   SAB IAB Ectopic Multiple Live Births   0 0 0 0 1      # Outcome Date GA Lbr Arie/2nd Weight Sex Delivery Anes PTL Lv   2 Current            1 Term 19 39w2d 04:47 / 01:14 2.89 kg (6 lb 5.9 oz) F Vag-Spont EPI N SUSANNE      Complications: Dysfunctional Labor, Preeclampsia/Hypertension, Chorioamnionitis      Name: BETHANIE SOLIMAN,FEMALE-MARYLIN      Apgar1: 8  Apgar5: 9            Immunzations:     Most Recent Immunizations   Administered Date(s) Administered    COVID-19 Monovalent 18+ (Moderna) 10/12/2021    Hepatitis B, Adult 2024    Influenza Vaccine >6 months,quad, PF 2023    MMR  01/31/2019    TDAP (Adacel,Boostrix) 02/07/2024     Tdap this pregnancy?:yes  Flu shot this pregnancy? yes  COVID immunized?: yes         Past Medical History:     Past Medical History:   Diagnosis Date    Diabetes (H)     GDMA1    Heartburn during pregnancy     Preeclampsia 2019            Past Surgical History:   No past surgical history on file.         Family History:   No family history on file.         Social History:   no tobacco use  no alcohol use  no illicit drug use         Medications:     Current Facility-Administered Medications   Medication Dose Route Frequency Provider Last Rate Last Admin    fentaNYL (SUBLIMAZE) 2 mcg/mL, ROPivacaine (NAROPIN) 0.1% in NaCl 0.9% for PIB + PCEA PREMIX   EPIDURAL PIB Goldberg, Leslie, MD        fentaNYL 2 mcg/mL-BUPivacaine 0.125% in NS BOLUS  10 mL EPIDURAL Once Goldberg, Leslie, MD        lactated ringers BOLUS 250 mL  250 mL Intravenous Once PRN Goldberg, Leslie, MD        metoclopramide (REGLAN) injection 10 mg  10 mg Intravenous Q6H PRN Xiao, Byron, DO        Or    metoclopramide (REGLAN) tablet 10 mg  10 mg Oral Q6H PRN Xiao, Byron, DO        nalbuphine (NUBAIN) injection 2.6-5 mg  2.6-5 mg Intravenous Q6H PRN Goldberg, Leslie, MD        naloxone (NARCAN) injection 0.2 mg  0.2 mg Intravenous Q2 Min PRN Xiao, Byron, DO        Or    naloxone (NARCAN) injection 0.4 mg  0.4 mg Intravenous Q2 Min PRN Xiao, Byron, DO        Or    naloxone (NARCAN) injection 0.2 mg  0.2 mg Intramuscular Q2 Min PRN Xiao, Byron, DO        Or    naloxone (NARCAN) injection 0.4 mg  0.4 mg Intramuscular Q2 Min PRN Xiao, Byron, DO        ondansetron (ZOFRAN ODT) ODT tab 4 mg  4 mg Oral Q6H PRN Xiao, Byron, DO        Or    ondansetron (ZOFRAN) injection 4 mg  4 mg Intravenous Q6H PRN Xiao, Byron, DO        phenylephrine (GIULIANO-SYNEPHRINE) injection 100 mcg  100 mcg Intravenous Q5 Min PRN Goldberg, Leslie, MD        prochlorperazine (COMPAZINE) injection 10 mg  10 mg Intravenous  "Q6H PRN Xiao, Byron, DO        Or    prochlorperazine (COMPAZINE) tablet 10 mg  10 mg Oral Q6H PRN Xiao, Byron, DO        Or    prochlorperazine (COMPAZINE) suppository 25 mg  25 mg Rectal Q12H PRN Xiao, Byron, DO                Allergies:   Patient has no known allergies.         Review of Systems:   CV: no chest pain, no palpitations, no new or worsening peripheral edema  GI: no nausea, no vomiting, no constipation, no diarrhea  No RUQ pain, no palpitations, no  Endorses slight leg swelling         Physical Exam:   Vitals:   /79   Temp 98  F (36.7  C) (Oral)   Resp 18   0 lbs 0 oz  Estimated body mass index is 29.75 kg/m  as calculated from the following:    Height as of 19: 1.397 m (4' 7\").    Weight as of 24: 58.1 kg (128 lb).    GEN: Awake, alert in no apparent distress   HEENT: grossly normal  RESPIRATORY: clear to auscultation bilaterally, no increased work of breathing  CARDIOVASCULAR: RRR, no murmur  ABDOMEN: gravid,  vertex by Leopold's and Ultrasound  Cervix: 4/50-70%/-2  EXT:  trace edema or calf tenderness  EFW on Exam:3400 g  Confirmed VTX by Ultrasound? yes    Electronic Fetal Monitoring:  O: Baseline rate normal  Variability moderate  Accelerations present  Decelerations not present    Assessment: Category I EFM interpretation suggests absence of concern for fetal metabolic acidemia at this time due to accelerations present, heart rate: normal baseline, and variability: moderate    Uterine Activity normal.    Strip reviewed on unit    NST interpretation:  Ordered by  Dr Pires  Start time 0105 Stop time 0125  Baseline rate 145 normal  Accelerations present  Decelerations not present  Interpretation: reactive      Assessment and Plan:   Assessment:   Marylin Mejía is a 33 year old  at 38w0d in active labor.   Patient Active Problem List   Diagnosis    Encounter for triage in pregnant patient    Labor and delivery, indication for care     labor    Rubella " non-immune status, antepartum    Vitamin D deficiency    Hx of pre-eclampsia, prior pregnancy, currently pregnant         Plan:   # Spontaneous Labor   - Admit to labor and delivery   - Observe for spontaneous progress  - Membranes: intact  - Labs: CBC, T&S.   - RPR (not indicated x2 in pregnancy and negative)  - GBS negative (3/29/24); Antibiotics not indicated.  - Pain Control: Per patient request; Discussed options. Desires epidural.  - PPH Prophylaxis: Standard medications    # GDMA1  - q2 hour glucose checks with sliding scale insulin per protocol  - clear liquid diet to start after epidural  - continue regular diet for now      # PNC  - Rh +, Rubella NI, GCT elevated (153, 3hr), GBS negative  - Other prenatal labs wnl  - Imaging: placenta posterior  - Received flu (11/1/23), Tdap vaccines (2/7/2024)  - Contraception: undecided                     # FWB:   Cat 1 tracing; Vertex by BSUS; EFW 3400g by Leopold's, AC >95% (3/28 [employer] US)  -  Per protocol Fetal Monitoring  - qShift NSTs   - Intrauterine resuscitative measures prn     # PPH Risk:  - Low (no prior uterine incisions, merino, <5 prior vaginal deliveries, no h/o pph or bleeding disorder), Type and screen ordered     # Disposition and coordination:  - Admit - see IP orders           Pearl Pires MD

## 2024-04-05 NOTE — PLAN OF CARE
Pt breathing through contractions, states she is coping well. Reviewed desires for pain management, interested in NO2 and epidural, declining IV fentanyl. Informed patient anesthesia would be coming by to discuss epidural placement with her. Reviewed plan to start IV and admit to labor and delivery room 444. Dr Pires bedside to see patient.

## 2024-04-05 NOTE — PROVIDER NOTIFICATION
04/05/24 0622   Provider Notification   Provider Name/Title Dr Pires   Method of Notification Electronic Page   Notification Reason SVE     Pt feeling increased pressure, SVE 10/100/1

## 2024-04-05 NOTE — PLAN OF CARE
of viable Male with Dr Pires and Dr Hagen in attendance. infant with spontaneous cry to mothers abdomen, dried and stimulated. Apgars 9/9. Placenta delivered without complications, pitocin bolused, 2nd degree laceration, with repairs done. Emelina cares provided. Mother and baby in stable condition.

## 2024-04-05 NOTE — PROGRESS NOTES
[facility]  Intrapartum Progress Note  2024 5:07 AM  Date of service: 2024.    SUBJECTIVE:    Doing well. Patient reports contractions every 2-3 minutes and denies vaginal bleeding or loss of fluids. Fetal movement is Normal.    OBJECTIVE:   Patient Vitals for the past 8 hrs:   BP Temp Temp src Resp SpO2   24 0340 -- -- -- -- 99 %   24 0320 104/65 -- -- -- 98 %   24 0315 115/53 -- -- -- --   24 0310 112/58 -- -- -- --   24 0305 112/61 -- -- -- --   24 0300 117/58 -- -- -- --   24 0255 -- -- -- -- 99 %   24 0252 113/58 -- -- -- --   24 0250 104/57 -- -- -- --   24 0248 118/77 -- -- -- --   24 0247 122/69 -- -- -- --   24 0245 117/61 -- -- -- --   24 0243 118/57 -- -- -- --   24 0241 (!) 154/69 -- -- -- --   24 0238 (!) 144/71 -- -- -- --   24 0236 137/76 -- -- -- --   24 0221 (!) 141/79 -- -- -- --   24 2337 129/79 98  F (36.7  C) Oral 18 --     Gen: no apparent distress, resting comfortably  Abd: Gravid.  EFW: 3400g    Sterile Vaginal Exam:  Membranes: AROM  Cervix: 8/80%/0   Consistency: soft  Presentation:Cephalic      Electronic Fetal Monitoring:  Fetal Baseline Rate: 150, normal  Variability: moderate  Accelerations: present   Decelerations: not present  Uterine Activity: every 2-3 minutes    AROM at bedside with clear fluid.    Strip reviewed on unit     ASSESSMENT and PLAN:  Marylin Mejía is a 33 year old  at 38w0d in active labor.   Patient Active Problem List   Diagnosis    Encounter for triage in pregnant patient    Labor and delivery, indication for care     labor    Rubella non-immune status, antepartum    Vitamin D deficiency    Hx of pre-eclampsia, prior pregnancy, currently pregnant    Normal labor       1.  Labor: spontaneous. active labor Progressing well.         AROM at bedside with clear fluid. Continue expectant management.  Plan to reassess in  one hours.   2.  Fetal Heart Rate Tracing: category one  3.  GBS negative.  Antibiotics are not indicated.  4.  Pain Management: epidural      Pearl Pires MD

## 2024-04-05 NOTE — PROVIDER NOTIFICATION
04/05/24 0005   Provider Notification   Provider Name/Title Xiao BURGOS   Method of Notification Electronic Page   Notification Reason Status Update;SVE     4/50/-2. MD will be down to see patient.

## 2024-04-05 NOTE — L&D DELIVERY NOTE
OB Vaginal Delivery Note    Marylin Mejía MRN# [id number]   Age: 33 year old YOB: 1990       GA: 38w0d  GP:   Labor Complications: None   EBL:   mL  Delivery QBL: 376 mL  Delivery Type: Vaginal, Spontaneous   ROM to Delivery Time: (Delivered) Hours: 1 Minutes: 15   Weight:     1 Minute 5 Minute 10 Minute   Apgar Totals: 9   9             On 2024 at 0638, this 33 year old year old  under Epidural analgesia delivered a viable AMAB infant weighing   6lb 8oz with APGAR scores of 9 at 1 min and 9 at 5 min.      Labor was spontaneous.   Complications: None     Stage 1: Latent labor  1700. Became active at  0315  Marylin Mejía, a 33 year old  female with GDMA1 who presented to L&D in spontaneous labor. Patient's GBS status was negative. Membrane status: AROM time: 5:23 AM, clear fluid. Analgesia provided prior to AROM: Epidural    Stage 2:  Fully dilated at 0622. Pushing began 0631 and delivery at 0638.  Delivery to a sterile field was via . INfant delivered in LISBET position.  Anterior and posterior shoulders delivered without difficulty. Infant was not bulb suctioned at delivery. Delayed cord clamping was performed, and cord was clamped x2 and cut by father and the infant was handed to waiting mother and father. Cord blood was sent and held for ABO analysis.     Stage 3:  Placenta at 0641  Placental delivery was spontaneous, intact,  with a 3 vessel cord. IV oxytocin was given.      The perineum was inspected and showed 1st degree tear, repaired in usual fashion with 3-0 vicryl, figure 8 stitch.    Excellent hemostasis was noted. Needle and sponge count correct. Infant and patient in delivery room in good and stable condition.      Cord complications: none   Placenta delivered at  . Placental disposition was Hospital disposal . Fundal massage performed and fundus found to be firm.     Episiotomy: none    Perineum, vagina, cervix were inspected, and the  following lacerations were noted:   Perineal lacerations: 1st              Present at Delivery:  Artur Jackson Dr.Marylin [[id number]]      Labor Event Times      Latent labor onset date/time: 2024 1700    Active labor onset date: 24 Onset time:  3:15 AM   Dilation complete date: 24 Complete time:  6:22 AM   Start pushing date/time: 2024 0631          Labor Events     labor?: No   steroids: None  Labor Type: Spontaneous  Predominate monitoring during 1st stage: continuous electronic fetal monitoring     Antibiotics received during labor?: No       Rupture date/time: 24 0523   Rupture type: Artificial Rupture of Membranes  Fluid color: Clear  Fluid odor: Normal     Augmentation: AROM       Delivery/Placenta Date and Time      Delivery Date: 24 Delivery Time:  6:38 AM                 Vaginal Counts       Initial count performed by 2 team members:  Two Team Members   Xiao Reza RN         Needles Suture Needles Sponges (RETIRED) Instruments   Initial counts 2 0 5    Added to count  1     Relief counts       Final counts               Placed during labor Accounted for at the end of labor   FSE No NA   IUPC No NA   Cervidil No NA                      Pre-Birth Team Brief: Complete       Apgars    Living status: Living   1 Minute 5 Minute 10 Minute 15 Minute 20 Minute   Skin color: 1  1       Heart rate: 2  2       Reflex irritability: 2  2       Muscle tone: 2  2       Respiratory effort: 2  2       Total: 9  9       Apgars assigned by: HARPAL REZA RN       Cord      Vessels: 3 Vessels    Cord Complications: None               Cord Blood Disposition: Lab    Gases Sent?: No    Delayed cord clamping?: Yes    Cord Clamping Delay (seconds): >120 seconds    Stem cell collection?: No            Resuscitation    Methods: None   Care at Delivery:  of viable male infant to mother's abdomen, dried and stimulated with  vigorous cry. Cord clamping delayed and then doubly clamped by MD and cut by partner. Infant remains skin to skin.       Skin to Skin and Feeding Plan      Skin to skin initiation date/time: 1/4/1841    Skin to skin with: Mother  Skin to skin end date/time:            Labor Events and Shoulder Dystocia    Fetal Tracing Prior to Delivery: Category 1  Shoulder dystocia present?: Neg       Delivery (Maternal) (Provider to Complete) ([id number])    Episiotomy: None  Perineal lacerations: 1st Repaired?: Yes   Repair suture: 3-0 Vicryl  Genital tract inspection done: Pos       Blood Loss  Mother: Marylin Richardson #[id number]     Start of Mother's Information      Delivery Blood Loss  04/05/24 0315 - 04/05/24 0821      Delivery QBL (mL) Hospital Encounter 376 mL    Total  376 mL               End of Mother's Information  Mother: Marylin Richardson #[id number]                Delivery - Provider to Complete ([id number])    Delivery Type (Choose the 1 that will go to the Birth History): Vaginal, Spontaneous                                           Placenta    Removal: Spontaneous  Disposition: Hospital disposal             Anesthesia    Method: Epidural  Cervical dilation at placement: 4-7                    Presentation and Position    Presentation: Vertex    Position: Left Occiput Anterior                     MD Dr. Byron Alvarez was present for delivery

## 2024-04-05 NOTE — CARE PLAN
Data: Marylin Mejía transferred to [place] [address] via wheelchair at 0935. Baby transferred via parent's arms.  Action: Receiving unit notified of transfer: Yes. Patient and family notified of room change. Report given to Ina JAEGER at 0940. Belongings sent to receiving unit. Accompanied by Registered Nurse. Oriented patient to surroundings. Call light within reach. ID bands double-checked with receiving RN.  Response: Patient tolerated transfer and is stable.

## 2024-04-06 VITALS
BODY MASS INDEX: 27.82 KG/M2 | HEART RATE: 72 BPM | WEIGHT: 119.7 LBS | RESPIRATION RATE: 14 BRPM | OXYGEN SATURATION: 100 % | TEMPERATURE: 98.2 F | SYSTOLIC BLOOD PRESSURE: 109 MMHG | DIASTOLIC BLOOD PRESSURE: 66 MMHG

## 2024-04-06 LAB
GLUCOSE BLDC GLUCOMTR-MCNC: 120 MG/DL (ref 70–99)
GLUCOSE BLDC GLUCOMTR-MCNC: 127 MG/DL (ref 70–99)
GLUCOSE BLDC GLUCOMTR-MCNC: 67 MG/DL (ref 70–99)
GLUCOSE BLDC GLUCOMTR-MCNC: 68 MG/DL (ref 70–99)
HGB BLD-MCNC: 9.8 G/DL (ref 11.7–15.7)

## 2024-04-06 PROCEDURE — 90707 MMR VACCINE SC: CPT | Performed by: FAMILY MEDICINE

## 2024-04-06 PROCEDURE — 36415 COLL VENOUS BLD VENIPUNCTURE: CPT | Performed by: FAMILY MEDICINE

## 2024-04-06 PROCEDURE — 250N000011 HC RX IP 250 OP 636: Performed by: FAMILY MEDICINE

## 2024-04-06 PROCEDURE — 85018 HEMOGLOBIN: CPT | Performed by: FAMILY MEDICINE

## 2024-04-06 PROCEDURE — 90471 IMMUNIZATION ADMIN: CPT | Performed by: FAMILY MEDICINE

## 2024-04-06 PROCEDURE — 250N000013 HC RX MED GY IP 250 OP 250 PS 637: Performed by: FAMILY MEDICINE

## 2024-04-06 PROCEDURE — 99238 HOSP IP/OBS DSCHRG MGMT 30/<: CPT | Performed by: FAMILY MEDICINE

## 2024-04-06 RX ORDER — FERROUS SULFATE 325(65) MG
325 TABLET, DELAYED RELEASE (ENTERIC COATED) ORAL 2 TIMES DAILY
Qty: 60 TABLET | Refills: 3 | Status: SHIPPED | OUTPATIENT
Start: 2024-04-06

## 2024-04-06 RX ADMIN — IBUPROFEN 800 MG: 800 TABLET, FILM COATED ORAL at 07:57

## 2024-04-06 RX ADMIN — MEASLES, MUMPS, AND RUBELLA VIRUS VACCINE LIVE 0.5 ML: 1000; 12500; 1000 INJECTION, POWDER, LYOPHILIZED, FOR SUSPENSION SUBCUTANEOUS at 13:23

## 2024-04-06 RX ADMIN — ACETAMINOPHEN 650 MG: 325 TABLET, FILM COATED ORAL at 02:05

## 2024-04-06 RX ADMIN — DOCUSATE SODIUM 100 MG: 100 CAPSULE, LIQUID FILLED ORAL at 07:57

## 2024-04-06 RX ADMIN — IBUPROFEN 800 MG: 800 TABLET, FILM COATED ORAL at 02:05

## 2024-04-06 RX ADMIN — ACETAMINOPHEN 650 MG: 325 TABLET, FILM COATED ORAL at 07:57

## 2024-04-06 ASSESSMENT — ACTIVITIES OF DAILY LIVING (ADL)
ADLS_ACUITY_SCORE: 18

## 2024-04-06 NOTE — DISCHARGE SUMMARY
[facility] Medicine  Post-partum Discharge Summary    Marylin Mejía MRN# [id number]   Age: 33 year old YOB: 1990     Date of Admission:  2024  Date of Discharge::  2024  Admitting Physician:  Byron Hagen DO  Discharge Physician:  Guera Friend MD     Home clinic: [facility]            Admission Diagnoses:   Encounter for triage in pregnant patient [Z36.89]  Normal labor [O80, Z37.9]          Discharge Diagnosis:     Normal spontaneous vaginal delivery  Intrauterine pregnancy at 38 weeks gestation  Patient Active Problem List   Diagnosis    Encounter for triage in pregnant patient    Labor and delivery, indication for care     labor    Rubella non-immune status, antepartum    Vitamin D deficiency    Hx of pre-eclampsia, prior pregnancy, currently pregnant    Normal labor             Procedures:     Procedure(s): No additional procedures performed                Medications Prior to Admission:     Medications Prior to Admission   Medication Sig Dispense Refill Last Dose    Prenatal Vit-Fe Fumarate-FA (PRENATAL VITAMIN PO) Take 1 tablet by mouth daily   Unknown    vitamin D3 (CHOLECALCIFEROL) 2000 units tablet Take 1 tablet by mouth daily   Unknown    [DISCONTINUED] aspirin 81 MG EC tablet Take 81 mg by mouth daily   Unknown    [DISCONTINUED] D 5000 125 MCG (5000 UT) CAPS Take 1 capsule by mouth daily   Unknown    [DISCONTINUED] ondansetron (ZOFRAN) 4 MG tablet Take 4 mg by mouth   Unknown             Discharge Medications:     Current Discharge Medication List        START taking these medications    Details   docusate sodium (COLACE) 100 MG capsule Take 1 capsule (100 mg) by mouth 2 times daily  Qty: 100 capsule, Refills: 0    Associated Diagnoses:  (spontaneous vaginal delivery)      ferrous sulfate (FE TABS) 325 (65 Fe) MG EC tablet Take 1 tablet (325 mg) by mouth 2 times daily  Qty: 60 tablet, Refills: 3    Associated Diagnoses: Anemia due to blood  loss, acute      ibuprofen (ADVIL/MOTRIN) 600 MG tablet Take 1 tablet (600 mg) by mouth every 6 hours as needed for moderate pain  Qty: 60 tablet, Refills: 0    Associated Diagnoses:  (spontaneous vaginal delivery)           CONTINUE these medications which have NOT CHANGED    Details   Prenatal Vit-Fe Fumarate-FA (PRENATAL VITAMIN PO) Take 1 tablet by mouth daily      vitamin D3 (CHOLECALCIFEROL) 2000 units tablet Take 1 tablet by mouth daily           STOP taking these medications       aspirin 81 MG EC tablet Comments:   Reason for Stopping:         D 5000 125 MCG (5000 UT) CAPS Comments:   Reason for Stopping:         ondansetron (ZOFRAN) 4 MG tablet Comments:   Reason for Stopping:                     Consultations:   none          Brief History of Labor:   On 24 at 0638 AM, this 33 year old year old  under Epidural analgesia delivered a viable Male infant weighing 6lb 8oz with APGAR scores below:  APGARs 1 Min 5Min 10Min   Totals:  9  9              Hospital Course (HPI):   The patient's hospital course was unremarkable.  On discharge, her pain was well controlled. Vaginal bleeding is decreased.  Voiding without difficulty.  Ambulating well and tolerating a normal diet.  No fever. Breast feeding going well.  Infant is stable.  She was discharged on post-partum day #1. Contraception plan:deciding (information given in [facility]). Post partum depression education was provided.         D/C Physical Exam:     Vitals:    24 1749 24 2233 24 0220 24 0729   BP: 129/75 98/61 128/74 109/66   BP Location:  Left arm Left arm    Patient Position:  Semi-Stiles's Semi-Stiles's    Cuff Size:  Adult Regular Adult Regular    Pulse: 70  61 72   Resp: 16 16 18 14   Temp: 99.2  F (37.3  C) 98.4  F (36.9  C) 98.2  F (36.8  C) 98.2  F (36.8  C)   TempSrc: Oral Oral Oral Oral   SpO2:           GENERAL:         healthy, alert, and no distress  RESPIRATORY:   No increased work of breathing, good air  exchange, clear to auscultation bilaterally, no crackles or wheezing   CARDIOVASCULAR:   Regular rate and rhythm, normal S1 and S2, no S3 or S4, and no murmur noted   ABDOMEN:   Normal bowel sounds, soft, non-distended, non-tender, no masses palpated, no hepatosplenomegally  Fundal height at level of umbilicus -2 cm.  Firm and mildly tender.   EXTREMITIES:          No edema, non-tender     Post-partum hemoglobin:   Hemoglobin   Date Value Ref Range Status   04/06/2024 9.8 (L) 11.7 - 15.7 g/dL Final   01/31/2019 7.9 (L) 11.7 - 15.7 g/dL Final           Discharge Instructions and Follow-Up:     Discharge diet: Regular   Discharge activity: Activity as tolerated   Discharge follow-up: Follow up with primary care provider in 2 and 6 weeks  2 hr OGTT recommended at 6 wks   Wound care: Drink plenty of fluids  Warm baths as needed          Discharge Disposition:     Discharged to home        Anemia of acute blood loss after delivery  Discharged with iron supplementation    GDMA1  Ok to eat regular diet; recommend fasting BG checks until 2 wk visit; recommend 2 hr OGTT at 6 wks      Guera Friend MD, MPH  Pronouns: she/her/hers    [facility]/ [facility]    Department of Family Medicine and Community Health

## 2024-04-06 NOTE — PLAN OF CARE
"Goal Outcome Evaluation:  Shift 0700-discharge  Afebrile. VSS, except 4-5/10. Fundus UU, scant, rubra lochia. LS clear on RA. Good PO intake, good appetite. Denies PreE Symptoms. Voiding independently, passing gas. Taking IBU and tylenol as needed. Bonding well with infant in room. Helping with infant cares. Patient is breast feeding and formula feeding every 2-3 hours. Hourly monitoring completed til discharge. Patient discharged. All education reviewed, questions addressed, medication reviewed and verified. Discharge weight completed. EDS 3, BC completed, Videos reviewed. ROP completed yesterday. Has a Pump at home. MMR given. Discharged at 1327.      Problem: Adult Inpatient Plan of Care  Goal: Plan of Care Review  Description: The Plan of Care Review/Shift note should be completed every shift.  The Outcome Evaluation is a brief statement about your assessment that the patient is improving, declining, or no change.  This information will be displayed automatically on your shift  note.  Outcome: Met  Goal: Patient-Specific Goal (Individualized)  Description: You can add care plan individualizations to a care plan. Examples of Individualization might be:  \"Parent requests to be called daily at 9am for status\", \"I have a hard time hearing out of my right ear\", or \"Do not touch me to wake me up as it startles  me\".  Outcome: Met  Goal: Absence of Hospital-Acquired Illness or Injury  Outcome: Met  Intervention: Prevent Skin Injury  Recent Flowsheet Documentation  Taken 4/6/2024 0729 by Ina Matthews, RN  Body Position: position changed independently  Intervention: Prevent Infection  Recent Flowsheet Documentation  Taken 4/6/2024 0729 by Ina Matthews, RN  Infection Prevention:   cohorting utilized   hand hygiene promoted   personal protective equipment utilized   rest/sleep promoted  Goal: Optimal Comfort and Wellbeing  Outcome: Met  Intervention: Monitor Pain and Promote Comfort  Recent Flowsheet " Documentation  Taken 4/6/2024 0757 by Ina Matthews RN  Pain Management Interventions: medication (see MAR)  Intervention: Provide Person-Centered Care  Recent Flowsheet Documentation  Taken 4/6/2024 0729 by Ina Matthews RN  Trust Relationship/Rapport:   care explained   emotional support provided   choices provided   empathic listening provided   questions answered   questions encouraged   reassurance provided   thoughts/feelings acknowledged  Goal: Readiness for Transition of Care  Outcome: Met     Problem: Postpartum (Vaginal Delivery)  Goal: Successful Parent Role Transition  Outcome: Met  Intervention: Support Parent Role Transition  Recent Flowsheet Documentation  Taken 4/6/2024 0729 by Ina Matthews RN  Supportive Measures:   active listening utilized   positive reinforcement provided   self-care encouraged  Parent-Child Attachment Promotion:   caring behavior modeled   face-to-face positioning promoted   cue recognition promoted   interaction encouraged   parent/caregiver presence encouraged   participation in care promoted   positive reinforcement provided   rooming-in promoted   skin-to-skin contact encouraged  Goal: Hemostasis  Outcome: Met  Goal: Absence of Infection Signs and Symptoms  Outcome: Met  Intervention: Prevent or Manage Infection  Recent Flowsheet Documentation  Taken 4/6/2024 0729 by Ina Matthews RN  Infection Management: aseptic technique maintained  Goal: Anesthesia/Sedation Recovery  Outcome: Met  Goal: Optimal Pain Control and Function  Outcome: Met  Intervention: Prevent or Manage Pain  Recent Flowsheet Documentation  Taken 4/6/2024 0757 by Ina Matthews RN  Pain Management Interventions: medication (see MAR)

## 2024-04-06 NOTE — DISCHARGE INSTRUCTIONS
Warning Signs after Having a Baby    Keep this paper on your fridge or somewhere else where you can see it.    Call your provider if you have any of these symptoms up to 12 weeks after having your baby.    Thoughts of hurting yourself or your baby  Pain in your chest or trouble breathing  Severe headache not helped by pain medicine  Eyesight concerns (blurry vision, seeing spots or flashes of light, other changes to eyesight)  Fainting, shaking or other signs of a seizure    Call 9-1-1 if you feel that it is an emergency.     The symptoms below can happen to anyone after giving birth. They can be very serious. Call your provider if you have any of these warning signs.    My provider s phone number: _______________________    Losing too much blood (hemorrhage)    Call your provider if you soak through a pad in less than an hour or pass blood clots bigger than a golf ball. These may be signs that you are bleeding too much.    Blood clots in the legs or lungs    After you give birth, your body naturally clots its blood to help prevent blood loss. Sometimes this increased clotting can happen in other areas of the body, like the legs or lungs. This can block your blood flow and be very dangerous.     Call your provider if you:  Have a red, swollen spot on the back of your leg that is warm or painful when you touch it.   Are coughing up blood.     Infection    Call your provider if you have any of these symptoms:  Fever of 100.4 F (38 C) or higher.  Pain or redness around your stitches if you had an incision.   Any yellow, white, or green fluid coming from places where you had stitches or surgery.    Mood Problems (postpartum depression)    Many people feel sad or have mood changes after having a baby. But for some people, these mood swings are worse.     Call your provider right away if you feel so anxious or nervous that you can't care for yourself or your baby.    Preeclampsia (high blood pressure)    Even if you  "didn't have high blood pressure when you were pregnant, you are at risk for the high blood pressure disease called preeclampsia. This risk can last up to 12 weeks after giving birth.     Call your provider if you have:   Pain on your right side under your rib cage  Sudden swelling in the hands and face    Remember: You know your body. If something doesn't feel right, get medical help.     For informational purposes only. Not to replace the advice of your health care provider. Copyright 2020 [employer] [facility]. All rights reserved. Clinically reviewed by Delia Becker, RNC-OB, MSN. [employer] 487151 - Rev 02/23.    El período posparto: Instrucciones de cuidado-Instrucciones de cuidado  Postpartum: Care Instructions  Instrucciones de cuidado  Después del parto (período posparto), araujo cuerpo pasa por muchos cambios. Algunos de estos cambios suceden yamel varias semanas. Yamel las horas posteriores al parto, el cuerpo comienza a recuperarse y se prepara para el amamantamiento. Es posible que yamel sergio tiempo se sienta sensible. Las hormonas pueden cambiar araujo estado de ánimo sin advertencia y sin motivo aparente.  Yamel las primeras semanas después del parto, muchas mujeres tienen emociones que cambian de varma a chester. Es posible que le resulte difícil dormir. Es posible que llore mucho. Cherry se llama \"melancolía de la maternidad\". Estas emociones abrumadoras suelen desaparecer dentro de unos días o semanas. Leslie es importante hablar con araujo médico acerca de de sentimientos.  Yamel las primeras semanas después del parto, es fácil cansarse demasiado o sentirse abrumada. No shannon demasiados esfuerzos. Descanse cada vez que pueda, acepte la ayuda de los demás, coma issac y bárbara abundantes líquidos.  En el primer par de semanas después de uvaldo a libertad, es posible que araujo médico o partera deseen verla y hacer un plan para cualquier atención de seguimiento que usted pueda necesitar. Probablemente tendrá carina esteban " completa de posparto dentro de los primeros 3 meses después del parto. En parker momento, araujo médico o partera revisarán araujo recuperación del parto. Él o adis también verán cómo está enfrentando usted de emociones y conversarán sobre de inquietudes y preguntas.  La atención de seguimiento es carina parte clave de araujo tratamiento y seguridad. Asegúrese de hacer y acudir a todas las citas, y llame a araujo médico si está teniendo problemas. También es carina buena idea saber los resultados de de exámenes y mantener carina lista de los medicamentos que blair.   Cómo puede cuidarse en el hogar?  Duerma o descanse cuando araujo bebé lo shannon.  Si es posible, permita que de familiares o de amigos la ayuden con las tareas del hogar. No intente hacerlo todo usted manuel.  Si tiene hemorroides o hinchazón o dolor alrededor de la abertura de la vagina, pruebe aplicarse frío y calor. Puede aplicarse hielo o carina compresa fría en la jose raul yamel 10 a 20 minutos cada vez. Póngase un paño mcallister entre el hielo y la piel. Además, trate de sentarse en algunos centímetros de agua tibia (baño de asiento) 3 veces al día y después de las evacuaciones.  [place] los analgésicos (medicamentos para el dolor) exactamente según las indicaciones.  Si el médico le recetó un analgésico, tómelo según las indicaciones.  Si no está tomando un analgésico recetado, pregúntele a araujo médico si puede eugenia jessi de venta jasvir.  Coma más fibra para evitar el estreñimiento. Incluya alimentos jackelyn panes y cereales integrales, verduras crudas, frutas crudas y secas, y frijoles (habichuelas).  Kelsey mucho líquido. Si tiene carina enfermedad renal, cardíaca o hepática y tiene que restringir los líquidos, hable con araujo médico antes de aumentar la cantidad de líquido que rich.  No se lave el interior de la vagina con líquidos (lavados vaginales).  Si tiene puntos de sutura, mantenga la jose raul limpia con agua tibia, ya sea echándola o rociándola sobre la jose raul externa de la vagina y el ano  después de usar el baño.  Lleve carina lista de preguntas para hacerle a araujo médico o partera. Pratibha preguntas podrían ser acerca de lo siguiente:  Cambios en los senos, jackelyn bultos o sensibilidad.  Cuándo esperar que regrese araujo período menstrual.  Qué forma de anticoncepción es la más adecuada para usted.  El peso que aumentó yamel el embarazo.  Las opciones de ejercicio.  Qué alimentos y bebidas son mejores para usted, sobre todo si está amamantando.  Problemas que podría tener con la lactancia.  Cuándo puede tener relaciones sexuales. Algunas mujeres elfego vez quieran hablar sobre lubricantes vaginales.  Cualquier sentimiento de tristeza o inquietud que tenga.   Cuándo debe pedir ayuda?  Comparta esta información con araujo laura, araujo james o carina amiga. Pueden ayudarla a prestar atención a las señales de advertencia.  Llame al 911  en cualquier momento que considere que necesita atención de urgencia. Por ejemplo, llame si:    Tiene pensamientos de lastimarse o de hacerle daño a araujo bebé o a otra persona.     Se desmayó (perdió el conocimiento).     Tiene dolor en el pecho, le falta el aire o tose galdino.     Tiene convulsiones.   Dónde obtener ayuda las 24 horas del día, los 7 días de la semana   Si usted o alguien que conoce habla de suicidio, autolesionarse, carina crisis de yulissa mental, carina crisis por consumo de sustancias o cualquier otro tipo de malestar psíquico, obtenga ayuda de inmediato. Usted puede:    Marcar [phone] para llamar a la línea de prevención del suicidio y crisis.     Llamar al [phone] ([phone]).     Enviar un mensaje de texto que diga HOME al [phone] para acceder a la línea de mensajes de texto en casos de crisis.   Considere guardar estos números en araujo teléfono.  Visite M_[employer]UTION[employer] para obtener más información o conversar en línea.  Llame a araujo médico ahora mismo o busque atención médica de inmediato si:    Tiene señales de hemorragia (sangrado excesivo), jackelyn:  Sangrado vaginal intenso.  Titanic significa que está empapando carina o más toallas sanitarias en carina hora. O expulsa coágulos de galdino más grandes que un huevo.  Se siente mareada o aturdida, o siente jackelyn si se fuera a desmayar.  Se siente tan cansada o débil que no puede hacer de actividades habituales.  Latidos cardíacos acelerados o irregulares.  Dolor abdominal nuevo o más intenso.     Tiene señales de infección, tales jackelyn:  Fiebre.  Micción frecuente o dolorosa o galdino en la orina.  Secreción vaginal con olor desagradable.  Dolor abdominal nuevo o más intenso.     Tiene síntomas de un coágulo de galdino en la pierna (llamado trombosis venosa profunda), tales jackelyn:  Dolor en la pantorrilla, la parte posterior de la rodilla, el muslo o la sara.  Hinchazón en la pierna o la sara.  Un cambio de color en la pierna o la sara. La piel podría estar enrojecida o morada, según cuál sea araujo color de piel normal.     Tiene señales de preeclampsia, tales jackelyn:  Hinchazón repentina de la cliff, las kaley o los pies.  Nuevos problemas de la vista (jackelyn oscurecimiento, sebastián borroso o sebastián puntos).  Dolor de laurie intenso.     Tiene señales de insuficiencia cardíaca, tales jackelyn:  Nueva o mayor falta de aire.  Nueva o mayor hinchazón en las piernas, los tobillos o los pies.  Aumento repentino de peso, jackelyn más de 2 o 3 libras (0.9 kg o 1.4 kg) en un día o 5 libras (2.3 kg) en carina semana.  Se siente tan cansada o débil que no puede hacer de actividades habituales.     Se sometió a un alivio del dolor raquídeo o epidural y tiene:  Dolor de espalda nuevo o peor.  Aumento del dolor, la hinchazón, la temperatura o el enrojecimiento en el lugar de la inyección.  Hormigueo, debilidad o entumecimiento en las piernas o la sara.   Preste especial atención a los cambios en araujo yulissa y asegúrese de comunicarse con araujo médico si:    El sangrado vaginal no disminuye.     Siente tristeza, ansiedad o desesperanza yamel más de algunos días.     Tiene problemas con los  "senos o el amamantamiento.    Dónde puede encontrar más información en inglés?  Vaya a [email or web address]  Escriba Z768 en la búsqueda para aprender más acerca de \"El período posparto: Instrucciones de cuidado-Instrucciones de cuidado.\"  Revisado: 10 donta, 2023               Versión del contenido: 14.0    5521-1138 [employer].   Las instrucciones de cuidado fueron adaptadas bajo licencia por araujo profesional de atención médica. Si usted tiene preguntas sobre carina afección médica o sobre estas instrucciones, siempre pregunte a araujo profesional de yulissa. [employer] niega toda garantía o responsabilidad por araujo uso de esta información.      "

## 2024-04-06 NOTE — PLAN OF CARE
Goal Outcome Evaluation:         Shift 6303-4502    VSS. Voiding independently. Taking tylenol and ibuprofen as needed. Bonding well with infant in room. Patient is independently breast feeding, followed by hand expressing every 2-3 hours.

## 2024-04-09 ENCOUNTER — HOSPITAL ENCOUNTER (OUTPATIENT)
Facility: CLINIC | Age: 34
End: 2024-04-09
Attending: STUDENT IN AN ORGANIZED HEALTH CARE EDUCATION/TRAINING PROGRAM | Admitting: STUDENT IN AN ORGANIZED HEALTH CARE EDUCATION/TRAINING PROGRAM
Payer: COMMERCIAL

## 2025-04-08 ENCOUNTER — HOSPITAL ENCOUNTER (EMERGENCY)
Facility: CLINIC | Age: 35
Discharge: HOME | End: 2025-04-08
Attending: FAMILY MEDICINE | Admitting: FAMILY MEDICINE
Payer: COMMERCIAL

## 2025-04-08 VITALS
RESPIRATION RATE: 16 BRPM | SYSTOLIC BLOOD PRESSURE: 106 MMHG | OXYGEN SATURATION: 97 % | WEIGHT: 116 LBS | TEMPERATURE: 97.8 F | DIASTOLIC BLOOD PRESSURE: 63 MMHG | HEART RATE: 67 BPM | BODY MASS INDEX: 26.96 KG/M2

## 2025-04-08 DIAGNOSIS — J06.9 ACUTE URI: ICD-10-CM

## 2025-04-08 DIAGNOSIS — H92.01 RIGHT EAR PAIN: ICD-10-CM

## 2025-04-08 PROCEDURE — 99284 EMERGENCY DEPT VISIT MOD MDM: CPT | Performed by: FAMILY MEDICINE

## 2025-04-08 RX ORDER — OXYMETAZOLINE HYDROCHLORIDE 0.05 G/100ML
2 SPRAY NASAL 2 TIMES DAILY
Qty: 1 ML | Refills: 0 | Status: SHIPPED | OUTPATIENT
Start: 2025-04-08 | End: 2025-04-11

## 2025-04-08 RX ORDER — GUAIFENESIN 600 MG/1
1200 TABLET, EXTENDED RELEASE ORAL 2 TIMES DAILY
Qty: 20 TABLET | Refills: 0 | Status: SHIPPED | OUTPATIENT
Start: 2025-04-08

## 2025-04-08 RX ORDER — AMOXICILLIN 500 MG/1
500 CAPSULE ORAL 3 TIMES DAILY
Qty: 30 CAPSULE | Refills: 0 | Status: SHIPPED | OUTPATIENT
Start: 2025-04-08 | End: 2025-04-18

## 2025-04-08 ASSESSMENT — ACTIVITIES OF DAILY LIVING (ADL)
ADLS_ACUITY_SCORE: 42
ADLS_ACUITY_SCORE: 42

## 2025-04-08 ASSESSMENT — COLUMBIA-SUICIDE SEVERITY RATING SCALE - C-SSRS
2. HAVE YOU ACTUALLY HAD ANY THOUGHTS OF KILLING YOURSELF IN THE PAST MONTH?: NO
6. HAVE YOU EVER DONE ANYTHING, STARTED TO DO ANYTHING, OR PREPARED TO DO ANYTHING TO END YOUR LIFE?: NO
1. IN THE PAST MONTH, HAVE YOU WISHED YOU WERE DEAD OR WISHED YOU COULD GO TO SLEEP AND NOT WAKE UP?: NO

## 2025-04-08 NOTE — ED TRIAGE NOTES
Patient had the flu 8 days ago. Since then intermittent right ear pain and change to her hearing. She reports periods of severe pain that radiates up her head.     Triage Assessment (Adult)       Row Name 04/08/25 1127          Triage Assessment    Airway WDL WDL        Respiratory WDL    Respiratory WDL WDL        Skin Circulation/Temperature WDL    Skin Circulation/Temperature WDL WDL        Cardiac WDL    Cardiac WDL WDL        Peripheral/Neurovascular WDL    Peripheral Neurovascular WDL WDL        Cognitive/Neuro/Behavioral WDL    Cognitive/Neuro/Behavioral WDL WDL

## 2025-04-08 NOTE — ED PROVIDER NOTES
[facility] EMERGENCY DEPARTMENT ([facility])    4/08/25      ED PROVIDER NOTE    History     Chief Complaint   Patient presents with    Otalgia    Hearing Problem      used: [place].     Marylin Mejía is a 34 year old female who presents to the ED with ear pain. Patient reports developing a runny nose 8 days ago. Following this she developed intermittent right ear pain and has been hearing a buzzing noise that has been causing headaches. No other infectious symptoms such as fever, sore throat, cough. No frequent headphone use or sticking objects into her ears. She is otherwise healthy.    Past Medical History  Past Medical History:   Diagnosis Date    Diabetes (H)     GDMA1    Heartburn during pregnancy     Preeclampsia 2019     History reviewed. No pertinent surgical history.  amoxicillin (AMOXIL) 500 MG capsule  guaiFENesin (MUCINEX) 600 MG 12 hr tablet  oxymetazoline (AFRIN NASAL SPRAY) 0.05 % nasal spray  docusate sodium (COLACE) 100 MG capsule  ferrous sulfate (FE TABS) 325 (65 Fe) MG EC tablet  ibuprofen (ADVIL/MOTRIN) 600 MG tablet  Prenatal Vit-Fe Fumarate-FA (PRENATAL VITAMIN PO)  vitamin D3 (CHOLECALCIFEROL) 2000 units tablet      No Known Allergies  Family History  No family history on file.  Social History   Social History     Tobacco Use    Smoking status: Never    Smokeless tobacco: Never   Substance Use Topics    Alcohol use: No    Drug use: No      Past medical history, past surgical history, medications, allergies, family history, and social history were reviewed with the patient. No additional pertinent items.     A medically appropriate review of systems was performed with pertinent positives and negatives noted in the HPI, and all other systems negative.    Physical Exam   BP: 106/63  Pulse: 67  Temp: 97.8  F (36.6  C)  Resp: 16  Weight: 52.6 kg (116 lb)  SpO2: 97 %    Physical Exam  Vitals and nursing note reviewed.   Constitutional:       General: She is not  in acute distress.     Appearance: Normal appearance. She is not diaphoretic.   HENT:      Head: Atraumatic.      Right Ear: Ear canal and external ear normal. A middle ear effusion is present. No mastoid tenderness. Tympanic membrane is erythematous.      Left Ear: Ear canal and external ear normal. No mastoid tenderness.      Nose: Mucosal edema, congestion and rhinorrhea present.      Right Sinus: Maxillary sinus tenderness present.      Left Sinus: Maxillary sinus tenderness present.      Mouth/Throat:      Mouth: Mucous membranes are moist.   Eyes:      General: No scleral icterus.     Conjunctiva/sclera: Conjunctivae normal.   Cardiovascular:      Rate and Rhythm: Normal rate.      Heart sounds: Normal heart sounds.   Pulmonary:      Effort: No respiratory distress.      Breath sounds: Normal breath sounds.   Abdominal:      General: Abdomen is flat.   Musculoskeletal:      Cervical back: Neck supple.   Skin:     General: Skin is warm.      Findings: No rash.   Neurological:      Mental Status: She is alert.           ED Course, Procedures, & Data      Procedures                No results found for any visits on 04/08/25.  Medications - No data to display  Labs Ordered and Resulted from Time of ED Arrival to Time of ED Departure - No data to display  No orders to display          Critical care was not performed.     Medical Decision Making  The patient's presentation was of low complexity (an acute and uncomplicated illness or injury).    The patient's evaluation involved:  history and exam without other MDM data elements    The patient's management necessitated moderate risk (prescription drug management including medications given in the ED).    Assessment & Plan    Deviously healthy 34-year-old woman with 5 days of URI symptoms specifically nasal and facial congestion now presenting with right ear fullness, buzzing and pain.  On exam she has normal vital signs and is nontoxic-appearing.  The external ear  and mastoid process normal.  She has nasal congestion evident on exam and maxillary sinus tenderness.  Her oropharynx is clear.  The right TM has an effusion is slightly erythematous.  Exam is otherwise normal.  Plan to treat for rhinosinusitis and otitis media with decongestants and antibiotics.  We discussed the indications for emergency department return and follow-up.  Stable for discharge.      I have reviewed the nursing notes. I have reviewed the findings, diagnosis, plan and need for follow up with the patient.    New Prescriptions    AMOXICILLIN (AMOXIL) 500 MG CAPSULE    Take 1 capsule (500 mg) by mouth 3 times daily for 10 days.    GUAIFENESIN (MUCINEX) 600 MG 12 HR TABLET    Take 2 tablets (1,200 mg) by mouth 2 times daily.    OXYMETAZOLINE (AFRIN NASAL SPRAY) 0.05 % NASAL SPRAY    Spray 0.2 mLs (2 sprays) in nostril 2 times daily for 3 days.       Final diagnoses:   Right ear pain   Acute URI     IMichael, am serving as a trained medical scribe to document services personally performed by Andrew Young MD based on the provider's statements to me on April 8, 2025.  This document has been checked and approved by the attending provider.    I, Andrew Young MD, was physically present and have reviewed and verified the accuracy of this note documented by Michael Palencia medical scribe.      Andrew Young MD  [facility] EMERGENCY DEPARTMENT  4/8/2025     Andrew Young MD  04/08/25 2206

## 2025-04-08 NOTE — DISCHARGE INSTRUCTIONS
Please make an appointment to follow up with Your Primary Care Provider and [facility] (phone: [phone]) in 7 to 10 days days if not improving.